# Patient Record
Sex: FEMALE | Race: BLACK OR AFRICAN AMERICAN | NOT HISPANIC OR LATINO | Employment: FULL TIME | URBAN - METROPOLITAN AREA
[De-identification: names, ages, dates, MRNs, and addresses within clinical notes are randomized per-mention and may not be internally consistent; named-entity substitution may affect disease eponyms.]

---
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
## 2019-03-08 ENCOUNTER — TELEPHONE (OUTPATIENT)
Dept: OBGYN CLINIC | Age: 19
End: 2019-03-08

## 2019-03-08 NOTE — TELEPHONE ENCOUNTER
Pt called said during intercourse she experienced a cramp in the middle of her abdomen  She would like to discuss this w someone

## 2019-03-08 NOTE — TELEPHONE ENCOUNTER
Returned pts' p c - pt states " the cramp she experienced lasted about 20 min after IC, as well  Pt is due to have her period any day now " advised pt the cramp could be connected to her menstrual cycle & to monitor for now  If she continues to experience the cramping with or after IC,  to call for an office appt  Pt verbalized understanding

## 2019-08-06 ENCOUNTER — OFFICE VISIT (OUTPATIENT)
Dept: OBGYN CLINIC | Age: 19
End: 2019-08-06
Payer: COMMERCIAL

## 2019-08-06 VITALS
DIASTOLIC BLOOD PRESSURE: 68 MMHG | HEIGHT: 65 IN | WEIGHT: 143 LBS | BODY MASS INDEX: 23.82 KG/M2 | SYSTOLIC BLOOD PRESSURE: 102 MMHG

## 2019-08-06 DIAGNOSIS — N89.8 VAGINAL DISCHARGE: ICD-10-CM

## 2019-08-06 DIAGNOSIS — K59.00 CONSTIPATION, UNSPECIFIED CONSTIPATION TYPE: ICD-10-CM

## 2019-08-06 DIAGNOSIS — Z11.3 SCREENING FOR STDS (SEXUALLY TRANSMITTED DISEASES): Primary | ICD-10-CM

## 2019-08-06 DIAGNOSIS — R10.2 PELVIC PAIN: ICD-10-CM

## 2019-08-06 PROCEDURE — 87491 CHLMYD TRACH DNA AMP PROBE: CPT | Performed by: NURSE PRACTITIONER

## 2019-08-06 PROCEDURE — 87210 SMEAR WET MOUNT SALINE/INK: CPT | Performed by: NURSE PRACTITIONER

## 2019-08-06 PROCEDURE — 99213 OFFICE O/P EST LOW 20 MIN: CPT | Performed by: NURSE PRACTITIONER

## 2019-08-06 PROCEDURE — 87591 N.GONORRHOEAE DNA AMP PROB: CPT | Performed by: NURSE PRACTITIONER

## 2019-08-06 NOTE — PROGRESS NOTES
Assessment/Plan:    No problem-specific Assessment & Plan notes found for this encounter  Diagnoses and all orders for this visit:    Screening for STDs (sexually transmitted diseases)  -     Chlamydia/GC amplified DNA by PCR  -     Trichomonas Vaginalis, FANI    Constipation, unspecified constipation type  -     Ambulatory referral to Gastroenterology; Future    Pelvic pain  -     US pelvis complete w transvaginal; Future    Vaginal discharge  -     POCT wet mount        Given handout to read about pain with sex, will see GI  Subjective:      Patient ID: Trinh Rosales is a 22 y o  female  Pleasant 22 y o  here for pelvic pain complaints x 5 months  Seems the pain is worse after sex- feels like a marly cramp that won't go away  She denies any treatments tried  Denies recent antibiotic use  Denies douching  Denies fever  +new sexual partner since last check but states pain was there even before them  Having vaginal discharge but no odor or itching  Declines a BCM, didn't like ocp or depo  Has a chronic constipation history and would like to see GI to see if it helps with her pelvic pain  The following portions of the patient's history were reviewed and updated as appropriate:   She  has a past medical history of No known health problems  She There are no active problems to display for this patient  She  has a past surgical history that includes No past surgeries  Her family history includes Breast cancer in her cousin  She  reports that she has quit smoking  She has never used smokeless tobacco  She reports that she drinks alcohol  She reports that she does not use drugs  No current outpatient medications on file  No current facility-administered medications for this visit  She has No Known Allergies  OB History    Para Term  AB Living   0 0 0 0 0 0   SAB TAB Ectopic Multiple Live Births   0 0 0 0 0           Review of Systems   Constitutional: Negative for chills, fatigue, fever and unexpected weight change  HENT: Negative for mouth sores and trouble swallowing  Gastrointestinal: Negative for abdominal distention, blood in stool, constipation and diarrhea  Genitourinary: Positive for vaginal discharge  Negative for difficulty urinating, dyspareunia, dysuria, frequency, genital sores, hematuria, menstrual problem, pelvic pain and vaginal pain  Musculoskeletal: Negative for neck stiffness  Psychiatric/Behavioral: Negative for agitation, confusion, self-injury and suicidal ideas  The patient is not nervous/anxious  Objective:      /68 (BP Location: Right arm, Patient Position: Sitting)   Ht 5' 5" (1 651 m)   Wt 64 9 kg (143 lb)   LMP 07/30/2019   Breastfeeding? No   BMI 23 80 kg/m²          Physical Exam   Constitutional: She appears well-developed and well-nourished  She is cooperative  No distress  Abdominal: Soft  Hernia confirmed negative in the right inguinal area and confirmed negative in the left inguinal area  Genitourinary: No labial fusion  There is no rash, tenderness, lesion or injury on the right labia  There is no rash, tenderness, lesion or injury on the left labia  Uterus is not deviated, not enlarged, not fixed and not tender  Cervix exhibits discharge  Cervix exhibits no motion tenderness and no friability  Right adnexum displays no mass, no tenderness and no fullness  Left adnexum displays no mass, no tenderness and no fullness  No erythema, tenderness or bleeding in the vagina  No foreign body in the vagina  No signs of injury around the vagina  Vaginal discharge found  Genitourinary Comments: Wet mount showed + occas hyphae (denies itching),   ph 4 5, +SHEETS of wbcs but no trich seen, whiff neg, clue cells neg   Lymphadenopathy:        Right: No inguinal adenopathy present  Left: No inguinal adenopathy present  Skin: She is not diaphoretic

## 2019-08-08 LAB
C TRACH DNA SPEC QL NAA+PROBE: POSITIVE
N GONORRHOEA DNA SPEC QL NAA+PROBE: POSITIVE

## 2019-08-12 DIAGNOSIS — A74.9 CHLAMYDIA: Primary | ICD-10-CM

## 2019-08-12 RX ORDER — AZITHROMYCIN 500 MG/1
1000 TABLET, FILM COATED ORAL DAILY
Qty: 2 TABLET | Refills: 0 | Status: SHIPPED | OUTPATIENT
Start: 2019-08-12 | End: 2019-08-13

## 2019-08-13 ENCOUNTER — CLINICAL SUPPORT (OUTPATIENT)
Dept: OBGYN CLINIC | Age: 19
End: 2019-08-13
Payer: COMMERCIAL

## 2019-08-13 VITALS — DIASTOLIC BLOOD PRESSURE: 64 MMHG | WEIGHT: 142.2 LBS | SYSTOLIC BLOOD PRESSURE: 100 MMHG | BODY MASS INDEX: 23.66 KG/M2

## 2019-08-13 DIAGNOSIS — A74.9 CHLAMYDIA: ICD-10-CM

## 2019-08-13 DIAGNOSIS — A54.9 GONORRHEA: Primary | ICD-10-CM

## 2019-08-13 PROCEDURE — 99213 OFFICE O/P EST LOW 20 MIN: CPT | Performed by: NURSE PRACTITIONER

## 2019-08-13 RX ORDER — CEFTRIAXONE SODIUM 250 MG/1
250 INJECTION, POWDER, FOR SOLUTION INTRAMUSCULAR; INTRAVENOUS ONCE
Status: COMPLETED | OUTPATIENT
Start: 2019-08-13 | End: 2019-08-13

## 2019-08-13 RX ADMIN — CEFTRIAXONE SODIUM 250 MG: 250 INJECTION, POWDER, FOR SOLUTION INTRAMUSCULAR; INTRAVENOUS at 16:40

## 2019-08-13 NOTE — PATIENT INSTRUCTIONS
Safe Sex   AMBULATORY CARE:   Safe sex  is a combination of practices taken to prevent pregnancy and the spread of sexually transmitted infections (STIs)  These practices help to decrease or prevent the exchange of body fluids during sexual contact  Body fluids include saliva, urine, blood, vaginal fluids, and semen  All types of sex can cause STIs  This includes oral, vaginal, and anal sex  Seek care immediately if:   · A condom breaks, leaks, or slips off while you are having sex  · You notice sores on your penis, vagina, anal area, or skin around them  · You have had unsafe sex and want to discuss emergency contraception or treatment for STI exposure  Contact your healthcare provider if:   · You think you might be pregnant  · You have questions or concerns about your condition or care  How to practice safe sex:  Talk to your partner before you have sex  Ask about his or her sexual history and any current or past STI  · Use condoms and barrier methods for all types of sexual contact  Use a new condom or latex barrier each time you have sex  This includes oral, vaginal, and anal sex  Make sure that the condom fits and is put on correctly  Rubber latex sheets or dental dams can be used for oral sex  Ask your healthcare provider how to use these items and where to purchase them  If you are allergic to latex, use a nonlatex product such as polyurethane  · Limit your number of sexual partners  More than one sex partner can increase your risk for an STI  Do not have sex with anyone whose sexual history you do not know  · Do not do activities that can pass germs  Do not use saliva as a lubricant or share sex toys  · Tell your sex partner if you have an STI  Your partner may need to be tested and treated  Do not have sex while you are being treated for an STI, or with a partner who is being treated  · Get tested regularly for STIs    Get tested if you have had sexual contact with someone who has an STI  Get tested if you have unprotected sex with any new partner  · Get vaccinated  Vaccines may help to lower your risk for an STI such as HPV, hepatitis A, or hepatitis B  Ask your healthcare provider for more information on vaccines  Other ways to practice safe sex:   · Only use water-based lubricants during sex  Water-based lubricants may prevent sores or cuts in the vagina or penis  Prevent sores or cuts to decrease your risk for an STI  Do not use oil-based lubricants, such as baby oil or hand lotion, with latex condoms or barriers  These will weaken the latex and may cause it to break  · Do not use chemical irritants on condoms or genitals  Products that contain chemical irritants, such as spermicides, can irritate the lining of your vagina or rectum  Irritation may cause sores that may increase your risk for an STI  · Be careful when you have sex if you have open sores or cuts  Open sores or cuts may increase your risk for an STI  This includes new piercings and tattoos  Keep all open sores or cuts covered during sex  Do not have oral sex if you have cuts or sores in your mouth  Ask your healthcare provider when it is safe to have sex after you get a tattoo or piercing  · Do not use alcohol or drugs before sex  These substances can prevent you from thinking clearly and increase your risk for unsafe sex  Follow up with your healthcare provider as directed:  Write down your questions so you remember to ask them during your visits  © 2017 [employer] Information is for End User's use only and may not be sold, redistributed or otherwise used for commercial purposes  All illustrations and images included in CareNotes® are the copyrighted property of [employer]  or [employer]  The above information is an  only  It is not intended as medical advice for individual conditions or treatments   Talk to your doctor, nurse or pharmacist before following any medical regimen to see if it is safe and effective for you

## 2019-10-28 NOTE — PROGRESS NOTES
Assessment  1  Acute vaginitis (616 10) (N76 0)   2  History of Screening for STD (sexually transmitted disease) (V74 5) (Z11 3)    Plan  Acute vaginitis    · Fluconazole 150 MG Oral Tablet (Diflucan); TAKE 1 TABLET NOW, AND REPEAT IN4 DAYS   Rx By: Natalya Kelly; Dispense: 2 Days ; #:2 Tablet; Refill: 1;For: Acute vaginitis; SAMIA = N; Sent To: [id number]   · MetroNIDAZOLE 0 75 % Vaginal Gel; INSERT 1 APPLICATORFUL INTRAVAGINALLYAT BEDTIME NIGHTLY   Rx By: Natalya Kelly; Dispense: 5 Days ; #:42 GM; Refill: 0;For: Acute vaginitis; SAMIA = N; Sent To: [facility] #[id number]    Discussion/Summary  Goals and Barriers: The patient has the current Goals: Prevent pregnancy  The patent has the current Barriers: Due for dmpa, no documentation of this  Patient's Capacity to Self-Care: Patient is able to Self-Care  Medication SE Review and Pt Understands Tx: Possible side effects of new medications were reviewed with the patient/guardian today  The treatment plan was reviewed with the patient/guardian  The patient/guardian understands and agrees with the treatment plan   Self Referrals:   Self Referrals: Yes      Chief Complaint  Chief Complaint Free Text Note Form: Patient here for a possible infection  Patient states discomfort since Tuesday  States has d/c and odor since Monday  Patient would like STD testing  No treatments tried  History of Present Illness  HPI: 25 yo pleasant female here for vaginal complaints  States has yellow d/c and odor for past week  Denies recent abx use but has had new sexual contacts since last checked in March  She denies pelvic pain and fever  Denies GI or  issues  Will bring documentation of her last DMPA shot so we can give it to her  Review of Systems  Focused-Female:  Constitutional: No fever, no chills, feels well, no tiredness, no recent weight gain or loss  Breasts: no complaints of breast pain, breast lump or nipple discharge    Gastrointestinal: no complaints [FreeTextEntry1] : 75 y/o male presents today for wound f/u s/p Watchman. \par Pt reports that the bruising on the R leg is extending to the knee. Denies any fever, tenderness, pain, chest pain, SOB, palpitations and back pain. \par \par Pt had a Watchman on 10/23/19 of abdominal pain, no constipation, no nausea or diarrhea, no vomiting, no bloody stools  Genitourinary: vaginal discharge, but-- no dysuria,-- no pelvic pain,-- no incontinence,-- no dysmenorrhea-- and-- no unexplained vaginal bleeding  ROS Reviewed:   ROS reviewed  Active Problems  1  Irregular menses (626 4) (N92 6)    Past Medical History  1  History of Counseling for initiation of birth control method (V25 02) (Z30 09)   2  History of Encounter for gynecological examination without abnormal finding (V72 31) (Z01 419)   3  History of General counseling for initiation of other contraceptive measures (V25 02) (Z30 09)   4  History of laryngitis (V12 69) (Z87 09)  Active Problems And Past Medical History Reviewed: The active problems and past medical history were reviewed and updated today  Surgical History  Surgical History Reviewed: The surgical history was reviewed and updated today  Family History  Father    1  Family history of varicose veins (V17 49) (Z82 49)  Paternal Grandmother    2  Family history of malignant neoplasm (V16 9) (Z80 9)   3  Family history of Headache  Maternal Cousin    4  Family history of lymphoma (V16 7) (Z80 2)   5  Family history of malignant neoplasm (V16 9) (Z80 9)   6  Family history of thyroid disease (V18 19) (Z83 49)  Multiple Family Members    7  Family history of diabetes mellitus (V18 0) (Z83 3)   8  Family history of hypertension (V17 49) (Z82 49)  Maternal Relatives    9  Family history of diabetes mellitus (V18 0) (Z83 3)   10  Family history of hypertension (V17 49) (Z82 49)  Paternal Relatives    6  Family history of diabetes mellitus (V18 0) (Z83 3)   12  Family history of hypertension (V17 49) (Z82 49)  Family History Reviewed: The family history was reviewed and updated today  Social History   · Currently sexually active   · Never a smoker   · Social alcohol use (Z78 9)   · Student  Social History Reviewed:  The social history was reviewed and is unchanged  At [facility]      Current Meds   1  Depo-Provera 150 MG/ML Intramuscular Suspension; Therapy: (Recorded:[id number]) to Recorded  Medication List Reviewed: The medication list was reviewed and updated today  Allergies  1  No Known Drug Allergies    Vitals  Vital Signs    Recorded: [id number] 37:66AZ   Systolic 180, RUE, Sitting   Diastolic 80, RUE, Sitting   Height 5 ft 6 in   Weight 139 lb    BMI Calculated 22 44   BSA Calculated 1 71   BMI Percentile 63 %   2-20 Stature Percentile 76 %   2-20 Weight Percentile 74 %   LMP depo     Physical Exam   Constitutional  General appearance: No acute distress, well appearing and well nourished  Pulmonary  Respiratory effort: No increased work of breathing or signs of respiratory distress  Genitourinary  External genitalia: Normal and no lesions appreciated  Vagina: Normal, no lesions or dryness appreciated  Urethral meatus: Normal    Bladder: Normal, soft, non-tender and no prolapse or masses appreciated  Cervix: Normal, no palpable masses  -- +ectropion  Uterus: Normal, non-tender, not enlarged, and no palpable masses  Adnexa/parametria: Normal, non-tender and no fullness or masses appreciated     Psychiatric  Orientation to person, place, and time: Normal    Mood and affect: Normal        Procedure  wet mount showed hyphae and clue cells, whiff negative      Signatures   Electronically signed by : TREY Chau; Dec 18 2017  8:25AM EST                       (Author)    Electronically signed by : Marlyn Mayen MD; Dec 18 2017 12:59PM EST

## 2020-03-10 ENCOUNTER — OFFICE VISIT (OUTPATIENT)
Dept: OBGYN CLINIC | Facility: CLINIC | Age: 20
End: 2020-03-10
Payer: COMMERCIAL

## 2020-03-10 VITALS
DIASTOLIC BLOOD PRESSURE: 62 MMHG | WEIGHT: 143.6 LBS | SYSTOLIC BLOOD PRESSURE: 100 MMHG | HEIGHT: 65 IN | BODY MASS INDEX: 23.93 KG/M2

## 2020-03-10 DIAGNOSIS — N92.6 IRREGULAR MENSES: Primary | ICD-10-CM

## 2020-03-10 DIAGNOSIS — Z11.3 SCREENING FOR STDS (SEXUALLY TRANSMITTED DISEASES): ICD-10-CM

## 2020-03-10 DIAGNOSIS — B37.49 CANDIDA INFECTION OF GENITAL REGION: ICD-10-CM

## 2020-03-10 DIAGNOSIS — Z30.011 OCP (ORAL CONTRACEPTIVE PILLS) INITIATION: ICD-10-CM

## 2020-03-10 PROCEDURE — 87661 TRICHOMONAS VAGINALIS AMPLIF: CPT | Performed by: NURSE PRACTITIONER

## 2020-03-10 PROCEDURE — 99214 OFFICE O/P EST MOD 30 MIN: CPT | Performed by: NURSE PRACTITIONER

## 2020-03-10 PROCEDURE — 87491 CHLMYD TRACH DNA AMP PROBE: CPT | Performed by: NURSE PRACTITIONER

## 2020-03-10 PROCEDURE — 87591 N.GONORRHOEAE DNA AMP PROB: CPT | Performed by: NURSE PRACTITIONER

## 2020-03-10 RX ORDER — LEVONORGESTREL AND ETHINYL ESTRADIOL 0.1-0.02MG
1 KIT ORAL DAILY
Qty: 28 TABLET | Refills: 2 | Status: SHIPPED | OUTPATIENT
Start: 2020-03-10 | End: 2020-05-29

## 2020-03-10 RX ORDER — FLUCONAZOLE 150 MG/1
150 TABLET ORAL ONCE
Qty: 1 TABLET | Refills: 6 | Status: SHIPPED | OUTPATIENT
Start: 2020-03-10 | End: 2020-03-10

## 2020-03-10 RX ORDER — DIPHENOXYLATE HYDROCHLORIDE AND ATROPINE SULFATE 2.5; .025 MG/1; MG/1
TABLET ORAL
COMMUNITY
End: 2022-05-13

## 2020-03-10 RX ORDER — RIBOFLAVIN (VITAMIN B2) 100 MG
100 TABLET ORAL DAILY
COMMUNITY
End: 2022-05-13

## 2020-03-10 NOTE — PATIENT INSTRUCTIONS
Vulvovaginal Candidiasis   WHAT YOU NEED TO KNOW:   What is vulvovaginal candidiasis? Vulvovaginal candidiasis, or yeast infection, is a common vaginal infection  Vulvovaginal candidiasis is caused by a fungus, or yeast-like germ  Fungi are normally found in your vagina  When there are too many fungi, it can cause an infection  What increases my risk for vulvovaginal candidiasis? · Pregnancy    · Medical conditions that suppress your immune system, such as diabetes or HIV and AIDS    · Medicines, such as antibiotics, birth control pills, or steroid medicine    · Contraceptive devices, such as diaphragms, sponges, and intrauterine devices  What are the signs and symptoms of vulvovaginal candidiasis? · Thick, white, cheese-like discharge from your vagina    · Itching, swelling, or redness in your vagina    · Burning when you urinate  How is vulvovaginal candidiasis diagnosed? Your healthcare provider will ask about your medical history and examine you  A sample of your vaginal discharge may show what germ is causing your infection  How is vulvovaginal candidiasis treated? Medicines help treat the fungal infection and decrease inflammation  The medicine may be a pill, topical cream, or vaginal suppository  With treatment, the infection is usually gone within a week: How can I manage my symptoms? · Wear cotton underwear  · Keep the vaginal area clean and dry  · Wipe from front to back after you urinate or have a bowel movement  · Do not have sex until your symptoms are gone  · Do not douche  · Do not use strong perfumes or soaps  · Do not use feminine hygiene sprays, powders, or bubble bath  How can I prevent another infection? · Take showers instead of baths  · Eat yogurt  · Limit the amount of alcohol you drink  · Limit your time in hot tubs  · Control your blood sugar if you are diabetic  When should I seek immediate care? · You have fever and chills      · You are bleeding from your vagina and it is not your monthly period  · You develop abdominal or pelvic pain  When should I contact my healthcare provider? · Your signs and symptoms get worse, even after treatment  · You have questions or concerns about your condition or care  CARE AGREEMENT:   You have the right to help plan your care  Learn about your health condition and how it may be treated  Discuss treatment options with your caregivers to decide what care you want to receive  You always have the right to refuse treatment  The above information is an  only  It is not intended as medical advice for individual conditions or treatments  Talk to your doctor, nurse or pharmacist before following any medical regimen to see if it is safe and effective for you  © 2017 [address] [employer] Information is for End User's use only and may not be sold, redistributed or otherwise used for commercial purposes  All illustrations and images included in CareNotes® are the copyrighted property of A D A M , Inc  or [employer]

## 2020-03-10 NOTE — PROGRESS NOTES
Diagnoses and all orders for this visit:    Irregular menses  -     levonorgestrel-ethinyl estradiol (AVIANE,ALESSE,LESSINA) 0 1-20 MG-MCG per tablet; Take 1 tablet by mouth daily for 28 days    Candida infection of genital region  -     fluconazole (DIFLUCAN) 150 mg tablet; Take 1 tablet (150 mg total) by mouth once for 1 dose    OCP (oral contraceptive pills) initiation  -     levonorgestrel-ethinyl estradiol (AVIANE,ALESSE,LESSINA) 0 1-20 MG-MCG per tablet; Take 1 tablet by mouth daily for 28 days    Screening for STDs (sexually transmitted diseases)  -     Trichomonas Vaginalis, FANI  -     Chlamydia/GC amplified DNA by PCR    Other orders  -     multivitamin (THERAGRAN) TABS  -     Ascorbic Acid (VITAMIN C) 100 MG tablet; Take 100 mg by mouth daily        Call if no symptom improvement, all questions answered, return for annual  Given info for Gardasil shots- will think about them  States only received 1  Pleasant 20 y o  here for vaginal complaints of itching a few days ago for which she used Monistat 1 with relief  She requests STD testing bc she has had a new partner  She tested + for HOSP [facility] BAKARI and CT at last visit 8/2019 but was treated and states she tested neg multiple times at her PCP (no results in chart)  Denies recent antibiotic use  Denies douching  Denies fever, pelvic pain or dyspareunia  Pelvic pain resolved after STD treatments so she did not do the pelvic u/s ordered at last visit  She would like to try ocp again for menses regulation since she cycles q 31-60 days  Last time she used ocp she would get frequent Mongolian so agrees to monthly prophylaxis  Past Medical History:   Diagnosis Date    Gonorrhea     No known health problems      Past Surgical History:   Procedure Laterality Date    NO PAST SURGERIES       Social History     Tobacco Use    Smoking status: Former Smoker    Smokeless tobacco: Never Used   Substance Use Topics    Alcohol use:  Yes    Drug use: No     Family History Problem Relation Age of Onset    Breast cancer Cousin     Colon cancer Neg Hx     Ovarian cancer Neg Hx     Uterine cancer Neg Hx     Cervical cancer Neg Hx        Current Outpatient Medications:     Ascorbic Acid (VITAMIN C) 100 MG tablet, Take 100 mg by mouth daily, Disp: , Rfl:     multivitamin (THERAGRAN) TABS, , Disp: , Rfl:     fluconazole (DIFLUCAN) 150 mg tablet, Take 1 tablet (150 mg total) by mouth once for 1 dose, Disp: 1 tablet, Rfl: 6    levonorgestrel-ethinyl estradiol (AVIANE,ALESSE,LESSINA) 0 1-20 MG-MCG per tablet, Take 1 tablet by mouth daily for 28 days, Disp: 28 tablet, Rfl: 2    No Known Allergies  OB History    Para Term  AB Living   0 0 0 0 0 0   SAB TAB Ectopic Multiple Live Births   0 0 0 0 0       Vitals:    03/10/20 0940   BP: 100/62   BP Location: Left arm   Patient Position: Sitting   Cuff Size: Standard   Weight: 65 1 kg (143 lb 9 6 oz)   Height: 5' 5" (1 651 m)     Body mass index is 23 9 kg/m²  Review of Systems   Constitutional: Negative for chills, fatigue, fever and unexpected weight change  Respiratory: Negative for shortness of breath  Gastrointestinal: Negative for anal bleeding, blood in stool, constipation and diarrhea  Genitourinary: Negative for difficulty urinating, dysuria and hematuria  Physical Exam   Constitutional: She appears well-developed and well-nourished  No distress  Alert and oriented  HENT: atraumatic  Head: Normocephalic  Neck: Normal range of motion  Neck supple  Pulmonary: Effort normal   Abdominal: Soft  Pelvic exam was performed with patient supine  No labial fusion  There is no rash, tenderness, lesion or injury on the right labia  There is no rash, tenderness, lesion or injury on the left labia  Urethral meatus does not show any tenderness, inflammation or discharge  Palpation of midline bladder without pain or discomfort  Uterus is not deviated, not enlarged, not fixed and not tender   Cervix exhibits no motion tenderness, no discharge and no friability  Right adnexum displays no mass, no tenderness and no fullness  Left adnexum displays no mass, no tenderness and no fullness  No erythema or tenderness in the vagina  No foreign body in the vagina  No signs of injury around the vagina  Vaginal discharge found  Perineum and anus without areas of injury  No lesions noted or swelling  Lymphadenopathy:        Right: No inguinal adenopathy present  Left: No inguinal adenopathy present       Wet mount showed +hyphae, ph 4 5, no wbcs or trich, whiff neg

## 2020-03-12 LAB
C TRACH DNA SPEC QL NAA+PROBE: NEGATIVE
N GONORRHOEA DNA SPEC QL NAA+PROBE: NEGATIVE

## 2020-03-15 LAB — T VAGINALIS RRNA SPEC QL NAA+PROBE: NEGATIVE

## 2020-05-28 DIAGNOSIS — Z30.011 OCP (ORAL CONTRACEPTIVE PILLS) INITIATION: ICD-10-CM

## 2020-05-28 DIAGNOSIS — N92.6 IRREGULAR MENSES: ICD-10-CM

## 2020-05-29 RX ORDER — LEVONORGESTREL AND ETHINYL ESTRADIOL 0.1-0.02MG
KIT ORAL
Qty: 28 TABLET | Refills: 9 | Status: SHIPPED | OUTPATIENT
Start: 2020-05-29 | End: 2022-05-13

## 2020-07-31 ENCOUNTER — TELEPHONE (OUTPATIENT)
Dept: OBGYN CLINIC | Facility: CLINIC | Age: 20
End: 2020-07-31

## 2020-07-31 NOTE — TELEPHONE ENCOUNTER
Pt messed up taking her pills took a plan b, then forgot to take her pills again had unprotected intercourse and took plan b again was worried this was take again so close together   Advised she takes a hpt and she said she already took one its negative, made sure she has refills and she will start her new pack after her period

## 2021-01-11 ENCOUNTER — TELEPHONE (OUTPATIENT)
Dept: OTHER | Facility: OTHER | Age: 21
End: 2021-01-11

## 2021-01-11 NOTE — TELEPHONE ENCOUNTER
C [x] 1/11/21 10:20 AM 20 TREY Hopkins [[id number]] CHASE ALVARADO [[id number]] YEARLY      Please call back to reschedule

## 2022-01-29 ENCOUNTER — APPOINTMENT (EMERGENCY)
Dept: RADIOLOGY | Facility: HOSPITAL | Age: 22
End: 2022-01-29
Payer: COMMERCIAL

## 2022-01-29 ENCOUNTER — HOSPITAL ENCOUNTER (EMERGENCY)
Facility: HOSPITAL | Age: 22
Discharge: HOME | End: 2022-01-29
Attending: EMERGENCY MEDICINE
Payer: COMMERCIAL

## 2022-01-29 VITALS
TEMPERATURE: 97.6 F | DIASTOLIC BLOOD PRESSURE: 61 MMHG | OXYGEN SATURATION: 100 % | HEART RATE: 78 BPM | RESPIRATION RATE: 18 BRPM | SYSTOLIC BLOOD PRESSURE: 108 MMHG
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
## 2022-01-29 DIAGNOSIS — S20.219A CHEST WALL CONTUSION: ICD-10-CM

## 2022-01-29 LAB
EXT PREG TEST URINE: NORMAL
EXT. CONTROL ED NAV: NORMAL

## 2022-01-29 PROCEDURE — 71046 X-RAY EXAM CHEST 2 VIEWS: CPT

## 2022-01-29 PROCEDURE — 99284 EMERGENCY DEPT VISIT MOD MDM: CPT | Performed by: EMERGENCY MEDICINE

## 2022-01-29 PROCEDURE — 99284 EMERGENCY DEPT VISIT MOD MDM: CPT

## 2022-01-29 PROCEDURE — 81025 URINE PREGNANCY TEST: CPT | Performed by: EMERGENCY MEDICINE

## 2022-01-29 RX ORDER — KETOROLAC TROMETHAMINE 30 MG/ML
15 INJECTION, SOLUTION INTRAMUSCULAR; INTRAVENOUS ONCE
Status: DISCONTINUED | OUTPATIENT
Start: 2022-01-29 | End: 2022-01-29 | Stop reason: HOSPADM
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
## 2022-01-29 NOTE — ED NOTES
Pt reports that she is going to take Ibuprofen when she gets home        Jarrod Landeros, RN  01/29/22 9182

## 2022-05-02 ENCOUNTER — TELEPHONE (OUTPATIENT)
Dept: OBGYN CLINIC | Facility: CLINIC | Age: 22
End: 2022-05-02

## 2022-05-02 NOTE — TELEPHONE ENCOUNTER
Patient recently pregnant last lmp 03/20 and is scheduled for appt for ultrasound but has a few questions

## 2022-05-03 ENCOUNTER — TELEPHONE (OUTPATIENT)
Dept: OBGYN CLINIC | Facility: CLINIC | Age: 22
End: 2022-05-03

## 2022-05-03 NOTE — TELEPHONE ENCOUNTER
Spoke to pt and she is scheduled for early US 6/2  Based on LMP of 3/20 should be seen 5/14-5/23  Also thought she needed HCG  Told her if + UPT not needed  But will ask  if they can find anything sooner for US ( might be difficult)  She is fine with either   Cassandra LEW or XOCHILT Ceja

## 2022-05-13 ENCOUNTER — ULTRASOUND (OUTPATIENT)
Dept: OBGYN CLINIC | Facility: CLINIC | Age: 22
End: 2022-05-13
Payer: COMMERCIAL

## 2022-05-13 VITALS — DIASTOLIC BLOOD PRESSURE: 62 MMHG | WEIGHT: 155.6 LBS | SYSTOLIC BLOOD PRESSURE: 104 MMHG | BODY MASS INDEX: 25.89 KG/M2

## 2022-05-13 DIAGNOSIS — N91.1 SECONDARY AMENORRHEA: Primary | ICD-10-CM

## 2022-05-13 DIAGNOSIS — Z13.79 GENETIC SCREENING: ICD-10-CM

## 2022-05-13 PROCEDURE — 99214 OFFICE O/P EST MOD 30 MIN: CPT | Performed by: OBSTETRICS & GYNECOLOGY

## 2022-05-13 NOTE — PROGRESS NOTES
Assessment/Plan:    Secondary amenorrhea      Genetic screening  -     Ambulatory Referral to Maternal Fetal Medicine; Future    [facility] @ 7w 5d EDC 2022 by LMP       -    Schedule NOB intake and physical          Subjective      Doug Bullock is a 24 y o   LMP 2022 who presents with amenorrhea and + urine hCG  Some blood-tinged discharge which has resolved  Nausea only  Cycle length: regular 28d  Pregnancy testing: at home  Pregnancy imaging: not done  Blood type: unknown  Other lab results: none  The following portions of the patient's history were reviewed and updated as appropriate: allergies, current medications, past family history, past medical history, past social history, past surgical history and problem list     Review of Systems  Pertinent items are noted in HPI       Objective      /62 (BP Location: Right arm, Patient Position: Sitting, Cuff Size: Standard)   Wt 70 6 kg (155 lb 9 6 oz)   BMI 25 89 kg/m²     General: alert and oriented, in no acute distress   Heart: regular rate and rhythm, S1, S2 normal, no murmur, click, rub or gallop   Lungs: clear to auscultation bilaterally   Abdomen: soft, non-tender, without masses or organomegaly   Vulva: normal

## 2022-05-23 ENCOUNTER — TELEPHONE (OUTPATIENT)
Dept: PERINATAL CARE | Facility: OTHER | Age: 22
End: 2022-05-23

## 2022-05-23 NOTE — TELEPHONE ENCOUNTER
Multiple attempts to reach patient to schedule two ultrasound appointments per referral from [facility] office  Unable to reach for scheduling

## 2022-05-27 ENCOUNTER — INITIAL PRENATAL (OUTPATIENT)
Dept: OBGYN CLINIC | Facility: CLINIC | Age: 22
End: 2022-05-27

## 2022-05-27 VITALS — BODY MASS INDEX: 25.89 KG/M2 | HEIGHT: 65 IN

## 2022-05-27 DIAGNOSIS — Z34.01 ENCOUNTER FOR SUPERVISION OF NORMAL FIRST PREGNANCY IN FIRST TRIMESTER: Primary | ICD-10-CM

## 2022-05-27 PROCEDURE — OBC: Performed by: OBSTETRICS & GYNECOLOGY

## 2022-05-27 RX ORDER — CETIRIZINE HYDROCHLORIDE 10 MG/1
10 TABLET ORAL DAILY
COMMUNITY
End: 2022-06-13

## 2022-05-27 NOTE — PROGRESS NOTES
OB INTAKE INTERVIEW  Patient is 21 y o  who presents for OB intake at 9-5 wks  She is accompanied by: phone interview  The father of her baby Camilla Oleary) is involved in the pregnancy and is supportive    Last Menstrual Period: 3/20/22  Ultrasound: Measured 7 weeks 5 days on 2022  Estimated Date of Delivery: 22 via LMP & US     Signs/Symptoms of Pregnancy  Current pregnancy symptoms: constipation  Constipation YES  Headaches YES-occas  Cramping/spotting no  PICA cravings no    Diabetes-    If patient has 1 or more, please order early 1 hour GTT  History of GDM no  BMI >35 no  History of PCOS or current metformin use no  History of LGA/macrosomic infant (4000g/9lbs) no    If patient has 2 or more, please order early 1 hour GTT  BMI>30 no  AMA no  First degree relative with type 2 diabetes no  History of chronic HTN, hyperlipidemia, elevated A1C no  High risk race (, , ,  or ) no    Hypertension- if you answer yes, please order preeclampsia labs (cbc, comprehensive metabolic panel, urine protein creatinine ratio, uric acid)  History of of chronic HTN no  History of gestational HTN no  History of preeclampsia, eclampsia, or HELLP syndrome no  History of diabetes no  History of lupus, autoimmune disease, kidney disease no    Thyroid- if yes order TSH with reflex T4  History of thyroid disease no    Bleeding Disorder or Hx of DVT-patient or first degree relative with history of  Order the following if not done previously  (Factor V, antithrombin III, prothrombin gene mutation, protein C and S Ag, lupus anticoagulant, anticardiolipin, beta-2 glycoprotein)   no    OB/GYN-  History of abnormal pap smear no  History of HPV no  History of Herpes/HSV no  History of other STI (gonorrhea, chlamydia, trich) YES-gonorrhea , was tx'd    History of prior  no  History of prior  no  History of  delivery prior to 36 weeks 6 days no  History of blood transfusion no  Ok for blood transfusion yes    Substance screening- if yes outside of tobacco for her or anyone in her home-order urine drug screen  History of tobacco use no  Currently using tobacco no  Currently using alcohol no  Presently using drugs no  Past drug use  Jaye Madrigal last used 2022  IV drug use-If yes add Hep C antibody to labs no  Partner drug use YES-marijuana  Parent/Family drug use YES-pt's uncle    MRSA Screening-   Does the pt have a hx of MRSA? no  If yes- please follow MRSA protocol and obtain a nasal swab for MRSA culture    Immunizations:  Influenza vaccine given this season no  Discussed Tdap vaccine yes  Discussed COVID Vaccine yes    Genetic/M-  Do you or your partner have a history of any of the following in yourselves or first degree relatives? Cystic fibrosis no  Spinal muscular atrophy no  Hemoglobinopathy/Sickle Cell/Thalassemia  Yes- pt carries sickle cell trait  Fragile X Intellectual Disability no    If yes, discuss carrier screening and recommend consultation with [facility]/genetic counseling  If no, discuss option for carrier screening and/or genetic testing with Nuchal Ultrasound  Patient interested yes  Appointment at [facility] made no- pt to call today for an appt    Interview education  [facility] Pregnancy Essentials Book reviewed and discussed yes    Nurse/Family Partnership- patient may qualify no; referral placed no    Prenatal lab work scripts yes  Extra labs ordered:  no    The patient has a history now or in prior pregnancy notable for:      Pt carries sickle cell trait,    Hx of Depression- no meds  ,  Hx of gonorrhea-tx'd,       Details that I feel the provider should be aware of:  Pt denies having received Flu or Covid vaccines   -discussed importance of receiving both  PN1 visit scheduled  The patient was oriented to our practice, reviewed delivering physicians and [address] [place] [address] for Delivery  All questions were answered    PN phone interview completed  Pt is happy with pregnancy  States FOB supportive  PN bldwk ordered in epic  Encouraged pt to have bldwk drawn prior to PN1 visit, scheduled for 6/13/2022  Referral entered for [facility] pt is interested in seq screen  Pt to call today for an appt  Advised pt to call with any further questions/concerns        Interviewed by: Neftaly Gutierrez RN, [address] [place] [address]

## 2022-06-09 ENCOUNTER — APPOINTMENT (OUTPATIENT)
Dept: LAB | Facility: HOSPITAL | Age: 22
End: 2022-06-09
Payer: COMMERCIAL

## 2022-06-09 DIAGNOSIS — Z34.01 ENCOUNTER FOR SUPERVISION OF NORMAL FIRST PREGNANCY IN FIRST TRIMESTER: Primary | ICD-10-CM

## 2022-06-09 LAB
ABO GROUP BLD: NORMAL
BACTERIA UR QL AUTO: NORMAL /HPF
BASOPHILS # BLD AUTO: 0.03 THOUSANDS/ΜL (ref 0–0.1)
BASOPHILS NFR BLD AUTO: 0 % (ref 0–1)
BILIRUB UR QL STRIP: NEGATIVE
BLD GP AB SCN SERPL QL: NEGATIVE
CLARITY UR: CLEAR
COLOR UR: YELLOW
EOSINOPHIL # BLD AUTO: 0.12 THOUSAND/ΜL (ref 0–0.61)
EOSINOPHIL NFR BLD AUTO: 1 % (ref 0–6)
ERYTHROCYTE [DISTWIDTH] IN BLOOD BY AUTOMATED COUNT: 13.2 % (ref 11.6–15.1)
GLUCOSE UR STRIP-MCNC: NEGATIVE MG/DL
HBV SURFACE AG SER QL: NORMAL
HCT VFR BLD AUTO: 37 % (ref 34.8–46.1)
HCV AB SER QL: NORMAL
HGB BLD-MCNC: 11.8 G/DL (ref 11.5–15.4)
HGB UR QL STRIP.AUTO: NEGATIVE
IMM GRANULOCYTES # BLD AUTO: 0.05 THOUSAND/UL (ref 0–0.2)
IMM GRANULOCYTES NFR BLD AUTO: 1 % (ref 0–2)
KETONES UR STRIP-MCNC: NEGATIVE MG/DL
LEUKOCYTE ESTERASE UR QL STRIP: NEGATIVE
LYMPHOCYTES # BLD AUTO: 1.6 THOUSANDS/ΜL (ref 0.6–4.47)
LYMPHOCYTES NFR BLD AUTO: 17 % (ref 14–44)
MCH RBC QN AUTO: 27.3 PG (ref 26.8–34.3)
MCHC RBC AUTO-ENTMCNC: 31.9 G/DL (ref 31.4–37.4)
MCV RBC AUTO: 86 FL (ref 82–98)
MONOCYTES # BLD AUTO: 0.59 THOUSAND/ΜL (ref 0.17–1.22)
MONOCYTES NFR BLD AUTO: 6 % (ref 4–12)
NEUTROPHILS # BLD AUTO: 6.95 THOUSANDS/ΜL (ref 1.85–7.62)
NEUTS SEG NFR BLD AUTO: 75 % (ref 43–75)
NITRITE UR QL STRIP: NEGATIVE
NON-SQ EPI CELLS URNS QL MICRO: NORMAL /HPF
NRBC BLD AUTO-RTO: 0 /100 WBCS
PH UR STRIP.AUTO: 7 [PH]
PLATELET # BLD AUTO: 289 THOUSANDS/UL (ref 149–390)
PMV BLD AUTO: 10 FL (ref 8.9–12.7)
PROT UR STRIP-MCNC: NEGATIVE MG/DL
RBC # BLD AUTO: 4.33 MILLION/UL (ref 3.81–5.12)
RBC #/AREA URNS AUTO: NORMAL /HPF
RH BLD: POSITIVE
SP GR UR STRIP.AUTO: <=1.005 (ref 1–1.03)
SPECIMEN EXPIRATION DATE: NORMAL
UROBILINOGEN UR QL STRIP.AUTO: 0.2 E.U./DL
WBC # BLD AUTO: 9.34 THOUSAND/UL (ref 4.31–10.16)
WBC #/AREA URNS AUTO: NORMAL /HPF

## 2022-06-09 PROCEDURE — 36415 COLL VENOUS BLD VENIPUNCTURE: CPT

## 2022-06-09 PROCEDURE — 86803 HEPATITIS C AB TEST: CPT

## 2022-06-09 PROCEDURE — 81001 URINALYSIS AUTO W/SCOPE: CPT

## 2022-06-09 PROCEDURE — 87086 URINE CULTURE/COLONY COUNT: CPT

## 2022-06-09 PROCEDURE — 80081 OBSTETRIC PANEL INC HIV TSTG: CPT

## 2022-06-10 LAB
BACTERIA UR CULT: NORMAL
HIV 1+2 AB+HIV1 P24 AG SERPL QL IA: NORMAL
RPR SER QL: NORMAL
RUBV IGG SERPL IA-ACNC: 82 IU/ML

## 2022-06-13 ENCOUNTER — INITIAL PRENATAL (OUTPATIENT)
Dept: OBGYN CLINIC | Facility: CLINIC | Age: 22
End: 2022-06-13

## 2022-06-13 VITALS — SYSTOLIC BLOOD PRESSURE: 106 MMHG | WEIGHT: 153 LBS | BODY MASS INDEX: 25.46 KG/M2 | DIASTOLIC BLOOD PRESSURE: 70 MMHG

## 2022-06-13 DIAGNOSIS — O99.019 SICKLE CELL TRAIT IN MOTHER AFFECTING PREGNANCY (HCC): ICD-10-CM

## 2022-06-13 DIAGNOSIS — Z3A.12 12 WEEKS GESTATION OF PREGNANCY: ICD-10-CM

## 2022-06-13 DIAGNOSIS — D57.3 SICKLE CELL TRAIT IN MOTHER AFFECTING PREGNANCY (HCC): ICD-10-CM

## 2022-06-13 DIAGNOSIS — F32.A DEPRESSION DURING PREGNANCY IN FIRST TRIMESTER: ICD-10-CM

## 2022-06-13 DIAGNOSIS — Z11.3 SCREENING FOR STDS (SEXUALLY TRANSMITTED DISEASES): ICD-10-CM

## 2022-06-13 DIAGNOSIS — O99.341 DEPRESSION DURING PREGNANCY IN FIRST TRIMESTER: ICD-10-CM

## 2022-06-13 DIAGNOSIS — Z01.419 ENCOUNTER FOR GYNECOLOGICAL EXAMINATION WITHOUT ABNORMAL FINDING: Primary | ICD-10-CM

## 2022-06-13 DIAGNOSIS — Z34.01 PRENATAL CARE, FIRST PREGNANCY, FIRST TRIMESTER: ICD-10-CM

## 2022-06-13 LAB
SL AMB  POCT GLUCOSE, UA: NORMAL
SL AMB POCT URINE PROTEIN: NORMAL

## 2022-06-13 PROCEDURE — 87491 CHLMYD TRACH DNA AMP PROBE: CPT | Performed by: PHYSICIAN ASSISTANT

## 2022-06-13 PROCEDURE — PNV: Performed by: PHYSICIAN ASSISTANT

## 2022-06-13 PROCEDURE — G0145 SCR C/V CYTO,THINLAYER,RESCR: HCPCS | Performed by: PHYSICIAN ASSISTANT

## 2022-06-13 PROCEDURE — 87591 N.GONORRHOEAE DNA AMP PROB: CPT | Performed by: PHYSICIAN ASSISTANT

## 2022-06-13 NOTE — PATIENT INSTRUCTIONS
Pregnancy at 11 to 14 Weeks   AMBULATORY CARE:   Changes happening to your body: You are now at the end of your first trimester and entering your second trimester  Morning sickness usually goes away by this time  You may have other symptoms such as fatigue, frequent urination, and headaches  You may have gained 2 to 4 pounds by now  Seek care immediately if:   You have pain or cramping in your abdomen or low back  You have heavy vaginal bleeding or clotting  You pass material that looks like tissue or large clots  Collect the material and bring it with you  Call your doctor or obstetrician if:   You cannot keep food or drinks down, and you are losing weight  You have light vaginal bleeding  You have chills or a fever  You have vaginal itching, burning, or pain  You have yellow, green, white, or foul-smelling vaginal discharge  You have pain or burning when you urinate, less urine than usual, or pink or bloody urine  You have questions or concerns about your condition or care  How to care for yourself at this stage of your pregnancy:       Get plenty of rest   You may feel more tired than normal  You may need to take naps or go to bed earlier  Manage nausea and vomiting  Avoid fatty and spicy foods  Eat small meals throughout the day instead of large meals  Danni may help to decrease nausea  Ask your healthcare provider about other ways of decreasing nausea and vomiting  Eat a variety of healthy foods  Healthy foods include fruits, vegetables, whole-grain breads, low-fat dairy foods, beans, lean meats, and fish  Drink liquids as directed  Ask how much liquid to drink each day and which liquids are best for you  Limit caffeine to less than 200 milligrams each day  Limit your intake of fish to 2 servings each week  Choose fish low in mercury such as canned light tuna, shrimp, salmon, cod, or tilapia   Do not  eat fish high in mercury such as swordfish, tilefish, rell mackerel, and shark  Take prenatal vitamins as directed  Your need for certain vitamins and minerals, such as folic acid, increases during pregnancy  Prenatal vitamins provide some of the extra vitamins and minerals you need  Prenatal vitamins may also help to decrease the risk of certain birth defects  Do not smoke  Smoking increases your risk of a miscarriage and other health problems during your pregnancy  Smoking can cause your baby to be born too early or weigh less at birth  Ask your healthcare provider for information if you need help quitting  Do not drink alcohol  Alcohol passes from your body to your baby through the placenta  It can affect your baby's brain development and cause fetal alcohol syndrome (FAS)  FAS is a group of conditions that causes mental, behavior, and growth problems  Talk to your healthcare provider before you take any medicines  Many medicines may harm your baby if you take them when you are pregnant  Do not take any medicines, vitamins, herbs, or supplements without first talking to your healthcare provider  Never use illegal or street drugs (such as marijuana or cocaine) while you are pregnant  Safety tips during pregnancy:   Avoid hot tubs and saunas  Do not use a hot tub or sauna while you are pregnant, especially during your first trimester  Hot tubs and saunas may raise your baby's temperature and increase the risk of birth defects  Avoid toxoplasmosis  This is an infection caused by eating raw meat or being around infected cat feces  It can cause birth defects, miscarriages, and other problems  Wash your hands after you touch raw meat  Make sure any meat is well-cooked before you eat it  Avoid raw eggs and unpasteurized milk  Use gloves or ask someone else to clean your cat's litter box while you are pregnant  Changes happening with your baby: Your baby has fully formed fingernails and toenails  Your baby's heartbeat can now be heard   Ask your healthcare provider if you can listen to your baby's heartbeat  By week 14, your baby is over 4 inches long from the top of the head to the rump (baby's bottom)  Your baby weighs over 3 ounces  Prenatal care:  Prenatal care is a series of visits with your healthcare provider throughout your pregnancy  During the first 28 weeks of your pregnancy, you will see your healthcare provider 1 time each month  Prenatal care can help prevent problems during pregnancy and childbirth  Your healthcare provider will check your blood pressure and weight  Your baby's heart rate will also be checked  You may also need the following at some visits:  A pelvic exam  allows your healthcare provider to see your cervix (the bottom part of your uterus)  Your healthcare provider will use a speculum to open your vagina  He or she will check the size and shape of your uterus  Blood tests  may be done to check for any of the following:    Gestational diabetes or anemia (low iron level)    Blood type or Rh factor, or certain birth defects    Immunity to certain diseases, such as chickenpox or rubella    An infection, such as a sexually transmitted infection, HIV, or hepatitis B    Hepatitis B  may need to be prevented or treated  Hepatitis B is inflammation of the liver caused by the hepatitis B virus (HBV)  HBV can spread from a mother to her baby during delivery  You will be checked for HBV as early as possible in the first trimester of each pregnancy  You need the test even if you received the hepatitis B vaccine or were tested before  You may need to have an HBV infection treated before you give birth  Urine tests  may also be done to check for sugar and protein  These can be signs of gestational diabetes or preeclampsia  Urine tests may also be done to check for signs of infection  A fetal ultrasound  shows pictures of your baby inside your uterus  The pictures are used to check your baby's development, movement, and position  Genetic disorder screening tests  may be offered to you  These tests check your baby's risk for genetic disorders such as Down syndrome  A screening test includes a blood test and ultrasound  Follow up with your doctor or obstetrician as directed:  Go to all prenatal visits  Write down your questions so you remember to ask them during your visits  © Copyright [employer] 2022 Information is for End User's use only and may not be sold, redistributed or otherwise used for commercial purposes  All illustrations and images included in CareNotes® are the copyrighted property of A D A M , Inc  or [facility]   The above information is an  only  It is not intended as medical advice for individual conditions or treatments  Talk to your doctor, nurse or pharmacist before following any medical regimen to see if it is safe and effective for you

## 2022-06-13 NOTE — PROGRESS NOTES
Patient is here for Initial PN 1 visit  She states she has occasional cramping; denies spotting or nausea  12wks/1days  RULA: 12/25/22  Last pap: never   GC/CH collected:yes, pap vial  PN 1 labs completed    Flu vaccine: no  Covid vaccine: no  Urine: protein: neg/glucose: neg  Blue information packet given: yes and reviewed  Nuchal: 6/17/22  Level II US scheduled: 8/11/22

## 2022-06-14 PROBLEM — D57.3 SICKLE CELL TRAIT IN MOTHER AFFECTING PREGNANCY (HCC): Status: ACTIVE | Noted: 2022-06-14

## 2022-06-14 PROBLEM — O99.019 SICKLE CELL TRAIT IN MOTHER AFFECTING PREGNANCY (HCC): Status: ACTIVE | Noted: 2022-06-14

## 2022-06-14 PROBLEM — F32.A DEPRESSION: Status: ACTIVE | Noted: 2022-06-14

## 2022-06-14 PROBLEM — Z3A.12 12 WEEKS GESTATION OF PREGNANCY: Status: ACTIVE | Noted: 2022-06-14

## 2022-06-14 NOTE — PROGRESS NOTES
Problem List Items Addressed This Visit        Other    12 weeks gestation of pregnancy     24 y o  Sarthak Covington at [address] with Estimated Date of Delivery: 22 by LMP consistent w/ 1st trimester US  She denies nausea/vomiting and bleeding/cramping  C/o fatigue, slowly improving  Prenatal labs: normal, O+    Genetic screening: Scheduled with [facility] on 2022  OB history:     GYN history: Last pap smear N/A -- first done today  History of gonorrhea, 2019, treated  Denies hx of HSV  Past medical history: depression, migraine, carrier of sickle cell trait without sickle cell anemia  Past surgical history: N/a    Social history: Prior Marijuana last used 2022  D/c when found out pregnant  Denies tobacco, alcohol, or other drug use  Family history: Father with hx of PE  Physical exam: normal    Patient appears well and is not in distress  Neck is supple without masses  Breasts are symmetrical without mass, tenderness, nipple discharge, skin changes or adenopathy  Abdomen is soft and nontender without masses  Gravid  External genitals are normal without lesions or rashes  Vagina is normal without discharge or bleeding  Cervix is normal without discharge or lesion  Closed  Uterus is normal for gestational age  Adnexa are normal, nontender, without palpable mass  Fetal heart tones: 165    Discussed as well during this visit was diet, prenatal vitamins, prenatal visits, lab testing, breast feeding, vaccinations, maternal fetal medicine consultations, and lifestyle      ASSESSMENT/PLAN   Problem List Items Addressed This Visit        Other    12 weeks gestation of pregnancy    Sickle cell trait in mother affecting pregnancy [facility])      Other Visit Diagnoses     Encounter for gynecological examination without abnormal finding    -  Primary    Relevant Orders    Liquid-based pap, screening    Prenatal care, first pregnancy, first trimester        Relevant Orders    Chlamydia/GC amplified DNA by PCR    POCT urine dip (Completed)    Screening for STDs (sexually transmitted diseases)        Relevant Orders    Chlamydia/GC amplified DNA by PCR          1 - Gonorrhea and Chlamydia cultures obtained today from pap  2 - Pap smear with reflex HPV done today  3 - Prenatal labs reviewed -- unremarkable  4 - Genetic screening scheduled for 22  5 - RTO in 4 weeks for routine PN visit    Blue packet given and reviewed     All questions were answered & Ari Rudd expressed understanding  Sickle cell trait in mother affecting pregnancy ([facility] [address] )     Sickle cell trait carrier  FOB unsure if he is a carrier  Reports Mom  from sickle event and unsure if dad is affected  He will obtain personal records and plan to review at [facility] appt  Depression     Hx of depression with good control recently  Previously on medications but states she only took for 2 weeks, the d/c with no improvement  Currently feels sx are well controlled  Reviewed increased risk for PPD and availability for counseling [facility] and [facility] if desired  Declines today                Other Visit Diagnoses     Encounter for gynecological examination without abnormal finding    -  Primary    Relevant Orders    Liquid-based pap, screening    Prenatal care, first pregnancy, first trimester        Relevant Orders    Chlamydia/GC amplified DNA by PCR    POCT urine dip (Completed)    Screening for STDs (sexually transmitted diseases)        Relevant Orders    Chlamydia/GC amplified DNA by PCR

## 2022-06-14 NOTE — ASSESSMENT & PLAN NOTE
24 y o   at [address] with Estimated Date of Delivery: 22 by LMP consistent w/ 1st trimester US  She denies nausea/vomiting and bleeding/cramping  C/o fatigue, slowly improving  Prenatal labs: normal, O+    Genetic screening: Scheduled with [facility] on 2022  OB history:     GYN history: Last pap smear N/A -- first done today  History of gonorrhea, 2019, treated  Denies hx of HSV  Past medical history: depression, migraine, carrier of sickle cell trait without sickle cell anemia  Past surgical history: N/a    Social history: Prior Marijuana last used 2022  D/c when found out pregnant  Denies tobacco, alcohol, or other drug use  Family history: Father with hx of PE  Physical exam: normal    Patient appears well and is not in distress  Neck is supple without masses  Breasts are symmetrical without mass, tenderness, nipple discharge, skin changes or adenopathy  Abdomen is soft and nontender without masses  Gravid  External genitals are normal without lesions or rashes  Vagina is normal without discharge or bleeding  Cervix is normal without discharge or lesion  Closed  Uterus is normal for gestational age  Adnexa are normal, nontender, without palpable mass  Fetal heart tones: 165    Discussed as well during this visit was diet, prenatal vitamins, prenatal visits, lab testing, breast feeding, vaccinations, maternal fetal medicine consultations, and lifestyle      ASSESSMENT/PLAN   Problem List Items Addressed This Visit        Other    12 weeks gestation of pregnancy    Sickle cell trait in mother affecting pregnancy [facility])      Other Visit Diagnoses     Encounter for gynecological examination without abnormal finding    -  Primary    Relevant Orders    Liquid-based pap, screening    Prenatal care, first pregnancy, first trimester        Relevant Orders    Chlamydia/GC amplified DNA by PCR    POCT urine dip (Completed)    Screening for STDs (sexually transmitted diseases)        Relevant Orders    Chlamydia/GC amplified DNA by PCR          1 - Gonorrhea and Chlamydia cultures obtained today from pap  2 - Pap smear with reflex HPV done today  3 - Prenatal labs reviewed -- unremarkable  4 - Genetic screening scheduled for 6/17/22  5 - RTO in 4 weeks for routine PN visit    Blue packet given and reviewed     All questions were answered & Hawa Fong expressed understanding

## 2022-06-14 NOTE — ASSESSMENT & PLAN NOTE
Sickle cell trait carrier  FOB unsure if he is a carrier  Reports Mom  from sickle event and unsure if dad is affected  He will obtain personal records and plan to review at [facility] appt

## 2022-06-14 NOTE — ASSESSMENT & PLAN NOTE
Hx of depression with good control recently  Previously on medications but states she only took for 2 weeks, the d/c with no improvement  Currently feels sx are well controlled  Reviewed increased risk for PPD and availability for counseling at [facility] and [facility] if desired  Declines today

## 2022-06-15 LAB
C TRACH DNA SPEC QL NAA+PROBE: NEGATIVE
N GONORRHOEA DNA SPEC QL NAA+PROBE: NEGATIVE

## 2022-06-16 LAB
LAB AP GYN PRIMARY INTERPRETATION: NORMAL
Lab: NORMAL

## 2022-06-17 ENCOUNTER — APPOINTMENT (OUTPATIENT)
Dept: LAB | Facility: HOSPITAL | Age: 22
End: 2022-06-17
Payer: COMMERCIAL

## 2022-06-17 ENCOUNTER — ROUTINE PRENATAL (OUTPATIENT)
Dept: PERINATAL CARE | Facility: OTHER | Age: 22
End: 2022-06-17
Payer: COMMERCIAL

## 2022-06-17 VITALS
WEIGHT: 157 LBS | DIASTOLIC BLOOD PRESSURE: 72 MMHG | BODY MASS INDEX: 26.16 KG/M2 | SYSTOLIC BLOOD PRESSURE: 109 MMHG | HEART RATE: 88 BPM | HEIGHT: 65 IN

## 2022-06-17 DIAGNOSIS — D57.3 SICKLE CELL TRAIT IN MOTHER AFFECTING PREGNANCY (HCC): ICD-10-CM

## 2022-06-17 DIAGNOSIS — O99.019 SICKLE CELL TRAIT IN MOTHER AFFECTING PREGNANCY (HCC): ICD-10-CM

## 2022-06-17 DIAGNOSIS — O36.80X0 ENCOUNTER TO DETERMINE FETAL VIABILITY OF PREGNANCY, SINGLE OR UNSPECIFIED FETUS: Primary | ICD-10-CM

## 2022-06-17 DIAGNOSIS — Z36.9 UNSPECIFIED ANTENATAL SCREENING: ICD-10-CM

## 2022-06-17 DIAGNOSIS — O34.10 UTERINE FIBROID IN PREGNANCY: ICD-10-CM

## 2022-06-17 DIAGNOSIS — D25.9 UTERINE FIBROID IN PREGNANCY: ICD-10-CM

## 2022-06-17 DIAGNOSIS — Z3A.12 12 WEEKS GESTATION OF PREGNANCY: ICD-10-CM

## 2022-06-17 DIAGNOSIS — Z33.1 PREGNANT STATE, INCIDENTAL: ICD-10-CM

## 2022-06-17 DIAGNOSIS — Z13.79 GENETIC SCREENING: ICD-10-CM

## 2022-06-17 DIAGNOSIS — Z36.82 ENCOUNTER FOR NUCHAL TRANSLUCENCY TESTING: ICD-10-CM

## 2022-06-17 PROCEDURE — 36415 COLL VENOUS BLD VENIPUNCTURE: CPT

## 2022-06-17 PROCEDURE — 76813 OB US NUCHAL MEAS 1 GEST: CPT | Performed by: OBSTETRICS & GYNECOLOGY

## 2022-06-17 PROCEDURE — 76801 OB US < 14 WKS SINGLE FETUS: CPT | Performed by: OBSTETRICS & GYNECOLOGY

## 2022-06-17 PROCEDURE — 99242 OFF/OP CONSLTJ NEW/EST SF 20: CPT | Performed by: OBSTETRICS & GYNECOLOGY

## 2022-06-17 RX ORDER — ASPIRIN 81 MG/1
162 TABLET ORAL DAILY
Qty: 180 TABLET | Refills: 3 | Status: SHIPPED | OUTPATIENT
Start: 2022-06-17

## 2022-06-17 NOTE — PROGRESS NOTES
CONSULT NOTE    James Marshall MD  [facility],  [address]     Thank you for referring your Lora Mcarthur for a Maternal-Fetal Medicine Consultation:  Below is my consultation  Thank you very much for requesting a consultation this very nice patient for the indication of genetic screening  This is the patient's 1st pregnancy  The patient indicates that she has sickle cell trait  I do not see a recent lab order for confirmation  The father of the baby is also a carrier for sickle cell trait given that his mother passed away from sickle cell disease  He is an obligate carrier  The patient's father had a pulmonary embolus  He has had a genetic workup but the patient does not know the results  We discussed the options for genetic screening, including but not limited to first trimester screening, second trimester screening, combined first and second trimester screening, noninvasive prenatal screening (NIPS) for patients at high risk and diagnostic screening through the use of CVS and amniocentesis  We discussed the risks and benefits of each approach including the sensitivities and false positive rates as well as the difference between a screening test and a diagnostic test   At the conclusion of our discussion the patient elected noninvasive prenatal testing utilizing the [employer] Non-invasive prenatal screening (NIPS) test   The patient had this blood work drawn in the office and the results should be available approximately 7-10 days after her blood draw  Her results will be reported from [facility]  Given the patient's history of nulliparity and ethnicity, I recommend initiating low dose aspirin therapy  A recent meta-analysis yielded risk reductions of 24% for preeclampsia, 20% for intrauterine growth restriction, and 14% for  birth, with an absolute risk reduction of 2-5% for preeclampsia, one to 5% for intrauterine growth restriction, and 2-4% for  birth    In this study, there was no identified risk of harm to the mother or fetus but long-term evidence was somewhat limited  Given the overall safety profile and risk-benefit analysis, I recommend 162 mg of aspirin be taken Daily and discontinued at around 36 weeks gestation or 2-3 weeks prior to planned delivery  I reviewed these recommendations with the patient and answered all of her questions to apparent satisfaction  We reviewed today's findings of a solitary uterine fibroid  The patient was unaware of having this previously  We discussed that in general solitary fibroids do not increase the risk for adverse outcomes unless they are very large  They may cause pain and may respond to hormones of pregnancy  We reviewed the availability of treatment should patient developed significant symptoms during pregnancy  We reviewed in detail the autosomal inheritance pattern of sickle cell  We reviewed that given that both the patient and her partner are carriers, there is a 25% chance of having a baby with sickle cell disease  There is a 50% chance of having a baby with sickle cell trait and there is a 25% chance that there child will not be a carrier  We reviewed the availability of diagnostic testing including utilizing a chorionic villus sampling in the 1st trimester or amniocentesis in the 2nd trimester  We reviewed results take 2-3 weeks to return  We reviewed the availability of formal genetic counseling  The patient and her partner declined to undergo or consider diagnostic testing at this time given that they would not consider termination for this condition  They are well aware of the implications given that her partner's mother passed away from complications related to sickle cell disease  I encouraged the patient to obtain more information regarding her father's genetic workup for thrombophilias    Tailored testing of the patient could then be considered or testing for all inherited thrombophilias could also be considered    We discussed follow-up in detail and I recommend an anatomy ultrasound be scheduled for 20 weeks gestation  Thank you very much for allowing us to participate in the care of this very nice patient  Should you have any questions, please do not hesitate to contact our office  Please note, in addition to the time spent discussing the results of the ultrasound, I spent approximately 30 minutes of face-to-face time with the patient, greater than 50% of which was spent in counseling and the coordination of care for this patient  Portions of the record may have been created with voice recognition software  Occasional wrong word or "sound a like" substitutions may have occurred due to the inherent limitations of voice recognition software  Read the chart carefully and recognize, using context, where substitutions have occurred  Bharathi Killian MD  Attending Physician, [place]

## 2022-06-17 NOTE — PROGRESS NOTES
Patient chose to have [facility] Non-invasive Prenatal Screen  Patient given brochure and is aware [facility] will contact patients insurance and coordinate coverage  Patient made aware she will need to respond to text message or e-mail from [employer] within 2 business days or testing will be run through insurance  Patient informed text message will come from area code  "415"  Provided [address] [place] [address] Street # [phone] and web site : [email or web address]  Patient is a hard stick and prefers to go to the lab  Specimens labeled with patient identifiers (name, date of birth, specimen collection date), packed and sent via [employer]  Copy of lab order scanned to Epic media  Maternal Fetal Medicine will have results in approximately 7-10 business days and will call patient or notify via [address]  Patient aware viewing lab result online will reveal fetal sex If ordered  Patient verbalized understanding of all instructions and no questions at this time

## 2022-06-17 NOTE — LETTER
June 17, 2022     Itzel Garcia, [address]    Patient: Will Pro   YOB: 2000   Date of Visit: 6/17/2022       Dear Dr Ronna Roberto: Thank you for referring Will Pro to me for evaluation  Below are my notes for this consultation  If you have questions, please do not hesitate to call me  I look forward to following your patient along with you  Sincerely,        Andry Velarde MD        CC: No Recipients  Andry Velarde MD  6/17/2022 11:57 AM  Sign when Signing Visit  [address], MD  [facility],  [address] [place] [address]     Thank you for referring your Will Pro for a Maternal-Fetal Medicine Consultation:  Below is my consultation  Thank you very much for requesting a consultation this very nice patient for the indication of genetic screening  This is the patient's 1st pregnancy  The patient indicates that she has sickle cell trait  I do not see a recent lab order for confirmation  The father of the baby is also a carrier for sickle cell trait given that his mother passed away from sickle cell disease  He is an obligate carrier  The patient's father had a pulmonary embolus  He has had a genetic workup but the patient does not know the results  We discussed the options for genetic screening, including but not limited to first trimester screening, second trimester screening, combined first and second trimester screening, noninvasive prenatal screening (NIPS) for patients at high risk and diagnostic screening through the use of CVS and amniocentesis    We discussed the risks and benefits of each approach including the sensitivities and false positive rates as well as the difference between a screening test and a diagnostic test   At the conclusion of our discussion the patient elected noninvasive prenatal testing utilizing the [facility] Non-invasive prenatal screening (NIPS) test  The patient had this blood work drawn in the office and the results should be available approximately 7-10 days after her blood draw  Her results will be reported from [facility]  Given the patient's history of nulliparity and ethnicity, I recommend initiating low dose aspirin therapy  A recent meta-analysis yielded risk reductions of 24% for preeclampsia, 20% for intrauterine growth restriction, and 14% for  birth, with an absolute risk reduction of 2-5% for preeclampsia, one to 5% for intrauterine growth restriction, and 2-4% for  birth  In this study, there was no identified risk of harm to the mother or fetus but long-term evidence was somewhat limited  Given the overall safety profile and risk-benefit analysis, I recommend 162 mg of aspirin be taken Daily and discontinued at around 36 weeks gestation or 2-3 weeks prior to planned delivery  I reviewed these recommendations with the patient and answered all of her questions to apparent satisfaction  We reviewed today's findings of a solitary uterine fibroid  The patient was unaware of having this previously  We discussed that in general solitary fibroids do not increase the risk for adverse outcomes unless they are very large  They may cause pain and may respond to hormones of pregnancy  We reviewed the availability of treatment should patient developed significant symptoms during pregnancy  We reviewed in detail the autosomal inheritance pattern of sickle cell  We reviewed that given that both the patient and her partner are carriers, there is a 25% chance of having a baby with sickle cell disease  There is a 50% chance of having a baby with sickle cell trait and there is a 25% chance that there child will not be a carrier  We reviewed the availability of diagnostic testing including utilizing a chorionic villus sampling in the 1st trimester or amniocentesis in the 2nd trimester  We reviewed results take 2-3 weeks to return    We reviewed the availability of formal genetic counseling  The patient and her partner declined to undergo or consider diagnostic testing at this time given that they would not consider termination for this condition  They are well aware of the implications given that her partner's mother passed away from complications related to sickle cell disease  I encouraged the patient to obtain more information regarding her father's genetic workup for thrombophilias  Tailored testing of the patient could then be considered or testing for all inherited thrombophilias could also be considered    We discussed follow-up in detail and I recommend an anatomy ultrasound be scheduled for 20 weeks gestation  Thank you very much for allowing us to participate in the care of this very nice patient  Should you have any questions, please do not hesitate to contact our office  Please note, in addition to the time spent discussing the results of the ultrasound, I spent approximately 30 minutes of face-to-face time with the patient, greater than 50% of which was spent in counseling and the coordination of care for this patient  Portions of the record may have been created with voice recognition software  Occasional wrong word or "sound a like" substitutions may have occurred due to the inherent limitations of voice recognition software  Read the chart carefully and recognize, using context, where substitutions have occurred  Bharathi Castillo MD  Attending Physician, Hasmukh  6/17/2022 11:45 AM  Sign when Signing Visit  Patient chose to have [facility] Non-invasive Prenatal Screen  Patient given brochure and is aware [facility] will contact patients insurance and coordinate coverage  Patient made aware she will need to respond to text message or e-mail from [employer] within 2 business days or testing will be run through insurance    Patient informed text message will come from area code  "415"  Provided [address] [place] [address] # [phone] and web site : [email or web address]  Patient is a hard stick and prefers to go to the lab  Specimens labeled with patient identifiers (name, date of birth, specimen collection date), packed and sent via [employer]  Copy of lab order scanned to Epic media  Maternal Fetal Medicine will have results in approximately 7-10 business days and will call patient or notify via [address]  Patient aware viewing lab result online will reveal fetal sex If ordered  Patient verbalized understanding of all instructions and no questions at this time

## 2022-06-20 LAB — MISCELLANEOUS LAB TEST RESULT: NORMAL

## 2022-06-26 ENCOUNTER — NURSE TRIAGE (OUTPATIENT)
Dept: OTHER | Facility: OTHER | Age: 22
End: 2022-06-26

## 2022-06-26 NOTE — TELEPHONE ENCOUNTER
Reason for Disposition   MODERATE-SEVERE abdominal pain (e g , interferes with normal activities, awakens from sleep)    Answer Assessment - Initial Assessment Questions  1  LOCATION: "Where does it hurt?"       Cramping lower pelvis x 3 days, intermittent    2  RADIATION: "Does the pain shoot anywhere else?" (e g , chest, back, shoulder)      Denies     3  ONSET: "When did the pain begin?" (e g , minutes, hours or days ago)       3 days ago    4  SEVERITY: "How bad is the pain?" "What does it keep you from doing?"  (e g , Scale 1-10; mild, moderate, or severe)    - MILD (1-3): doesn't interfere with normal activities, abdomen soft and not tender to touch     - MODERATE (4-7): interferes with normal activities or awakens from sleep, tender to touch     - SEVERE (8-10): excruciating pain, doubled over, unable to do any normal activities      Moderate    5  RECURRENT SYMPTOM: "Have you ever had this type of stomach pain before?" If Yes, ask: "When was the last time?" and "What happened that time?"       When she first became pregnant    6  CAUSE: "What do you think is causing the stomach pain?"      Unknown    7  RELIEVING/AGGRAVATING FACTORS: "What makes it better or worse?" (e g , antacids, bowel movement, movement)      Denies     8  OTHER SYMPTOMS: "Has there been any vaginal bleeding, fever, vomiting, diarrhea, or urine problems?"       Denies     9   RULA: "What date are you expecting to deliver?"      12/25/22    Protocols used: PREGNANCY - ABDOMINAL PAIN LESS THAN 20 WEEKS EGA-ADULT-AH

## 2022-06-26 NOTE — TELEPHONE ENCOUNTER
Regardin weeks / cramping for 3 days concern   ----- Message from [facility] sent at 2022  2:54 PM EDT -----  "I am calling because I am 14 weeks pregnant and maryann been having cramping for 3 days now and im starting to get nervous  "

## 2022-07-12 ENCOUNTER — TELEPHONE (OUTPATIENT)
Dept: OBGYN CLINIC | Facility: CLINIC | Age: 22
End: 2022-07-12

## 2022-07-12 NOTE — PROGRESS NOTES
Patient is here for prenatal ob visit today  C/o about a brown discharge that looked like a clot  GA: 16w5d  RULA: 2022    Urine: Protein Trace / Glucose Negative  Denies loss of fluid and contractions  Had spotting that was bright red on 7/10/2022  Good fetal movement  Declines having Covid vaccine

## 2022-07-12 NOTE — TELEPHONE ENCOUNTER
Incoming call from [facility] patient  Reports large amount of mucous discharge when voiding  Mostly white with a small tinge of brown  No cramping or contractions  Discussed increase in vaginal discharge in pregnancy is very normal as well as some spotting after intercourse or straining with bowel movement  Verbalizes understanding

## 2022-07-15 ENCOUNTER — ROUTINE PRENATAL (OUTPATIENT)
Dept: OBGYN CLINIC | Facility: CLINIC | Age: 22
End: 2022-07-15

## 2022-07-15 VITALS
WEIGHT: 160 LBS | BODY MASS INDEX: 26.66 KG/M2 | DIASTOLIC BLOOD PRESSURE: 72 MMHG | SYSTOLIC BLOOD PRESSURE: 110 MMHG | HEIGHT: 65 IN

## 2022-07-15 DIAGNOSIS — Z36.9 ANTENATAL SCREENING ENCOUNTER: ICD-10-CM

## 2022-07-15 DIAGNOSIS — Z3A.16 16 WEEKS GESTATION OF PREGNANCY: Primary | ICD-10-CM

## 2022-07-15 DIAGNOSIS — Z33.1 PREGNANT STATE, INCIDENTAL: ICD-10-CM

## 2022-07-15 LAB
SL AMB  POCT GLUCOSE, UA: NORMAL
SL AMB POCT URINE PROTEIN: ABNORMAL

## 2022-07-15 PROCEDURE — PNV

## 2022-07-15 NOTE — ASSESSMENT & PLAN NOTE
Nirmal Andre is a 24 y o   16w5d here for routine prenatal care  Prepregnancy BMI 25 with a goal weight gain 7 kg (15 lb)-11 5 kg (25 lb)  TWG 3 629 kg (8 lb)  Feels well  Denies LOF/CTX/VB  No concerns  Patient reports she had some bright red spotting 2 weeks ago but this has resolved since then  Reassured can be normal during pregnancy especially postcoital spotting  msAFP ordered  Discussed importance of ASA 162mg in reducing risk of preE,  birth, and IUGR   labor precautions reviewed  Encouraged adequate hydration and nutrition  Pregnancy Essential guide and Baby and Me center web site recommended

## 2022-07-15 NOTE — PATIENT INSTRUCTIONS
Pregnancy at 15 to 18 Weeks   AMBULATORY CARE:   What changes are happening to your body:  Now that you are in your second trimester, you have more energy  You may also feel hungrier than usual  You may start to experience other symptoms, such as heartburn or dizziness  You may be gaining about ½ to 1 pound a week, and your pregnancy is beginning to show  You may need to start wearing maternity clothes  Seek care immediately if:   You have pain or cramping in your abdomen or low back  You have heavy vaginal bleeding or clotting  You pass material that looks like tissue or large clots  Collect the material and bring it with you  Call your doctor or obstetrician if:   You cannot keep food or drinks down, and you are losing weight  You have light bleeding  You have chills or a fever  You have vaginal itching, burning, or pain  You have yellow, green, white, or foul-smelling vaginal discharge  You have pain or burning when you urinate, less urine than usual, or pink or bloody urine  You have questions or concerns about your condition or care  How to care for yourself at this stage of your pregnancy:       Manage heartburn  by eating 4 or 5 small meals each day instead of large meals  Avoid spicy foods  Avoid eating right before bedtime  Manage nausea and vomiting  Avoid fatty and spicy foods  Eat small meals throughout the day instead of large meals  Danni may help to decrease nausea  Ask your healthcare provider about other ways of decreasing nausea and vomiting  Eat a variety of healthy foods  Healthy foods include fruits, vegetables, whole-grain breads, low-fat dairy foods, beans, lean meats, and fish  Drink liquids as directed  Ask how much liquid to drink each day and which liquids are best for you  Limit caffeine to less than 200 milligrams each day  Limit your intake of fish to 2 servings each week   Choose fish low in mercury such as canned light tuna, shrimp, salmon, cod, or tilapia  Do not  eat fish high in mercury such as swordfish, tilefish, rell mackerel, and shark  Take prenatal vitamins as directed  Your need for certain vitamins and minerals, such as folic acid, increases during pregnancy  Prenatal vitamins provide some of the extra vitamins and minerals you need  Prenatal vitamins may also help to decrease the risk of certain birth defects  Do not smoke  Smoking increases your risk of a miscarriage and other health problems during your pregnancy  Smoking can cause your baby to be born too early or weigh less at birth  Ask your healthcare provider for information if you need help quitting  Do not drink alcohol  Alcohol passes from your body to your baby through the placenta  It can affect your baby's brain development and cause fetal alcohol syndrome (FAS)  FAS is a group of conditions that causes mental, behavior, and growth problems  Talk to your healthcare provider before you take any medicines  Many medicines may harm your baby if you take them when you are pregnant  Do not take any medicines, vitamins, herbs, or supplements without first talking to your healthcare provider  Never use illegal or street drugs (such as marijuana or cocaine) while you are pregnant  Safety tips during pregnancy:   Avoid hot tubs and saunas  Do not use a hot tub or sauna while you are pregnant, especially during your first trimester  Hot tubs and saunas may raise your baby's temperature and increase the risk of birth defects  Avoid toxoplasmosis  This is an infection caused by eating raw meat or being around infected cat feces  It can cause birth defects, miscarriages, and other problems  Wash your hands after you touch raw meat  Make sure any meat is well-cooked before you eat it  Avoid raw eggs and unpasteurized milk  Use gloves or ask someone else to clean your cat's litter box while you are pregnant      Changes that are happening with your baby:  By 23 weeks, your baby may be about 6 inches long from the top of the head to the rump (baby's bottom)  Your baby may weigh about 11 ounces  You may be able to feel your baby's movement at about 18 weeks or later  The first movements may not be that noticeable  They may feel like a fluttering sensation  Your baby also makes sucking movements and can hear certain sounds  What you need to know about prenatal care:  During the first 28 weeks of your pregnancy, you will see your healthcare provider once a month  Your healthcare provider will check your blood pressure and weight  You may also need any of the following:  A urine test  may also be done to check for sugar and protein  These can be signs of gestational diabetes or infection  A blood test  may be done to check for anemia (low iron level)  Fundal height check  is a measurement of your uterus to check your baby's growth  This number is usually the same as the number of weeks that you have been pregnant  An ultrasound  may be done to check your baby's development  Your healthcare provider may be able to tell you what your baby's gender is during the ultrasound  Your baby's heart rate  will be checked  © Copyright [employer] 2022 Information is for End User's use only and may not be sold, redistributed or otherwise used for commercial purposes  All illustrations and images included in CareNotes® are the copyrighted property of [employer]  or Kaity Hampton  The above information is an  only  It is not intended as medical advice for individual conditions or treatments  Talk to your doctor, nurse or pharmacist before following any medical regimen to see if it is safe and effective for you

## 2022-07-15 NOTE — PROGRESS NOTES
Problem   16 Weeks Gestation of Pregnancy    Blood Type: O positive  Antibody negative  Pap 2022  GC/CT negative  PN1 Labs: wnl Hgb: 11 8  28 Week Labs:  Level 1: 22  NIPS negative  msAFP pending  Level 2: 22  Delivery consent:   GBS swab:   IOL:   Breast pump order:   Vaccines:              16 weeks gestation of pregnancy  Amanda Ramirez is a 24 y o  Shivani Garb 16w5d here for routine prenatal care  Prepregnancy BMI 25 with a goal weight gain 7 kg (15 lb)-11 5 kg (25 lb)  TWG 3 629 kg (8 lb)  Feels well  Denies LOF/CTX/VB  No concerns  Patient reports she had some bright red spotting 2 weeks ago but this has resolved since then  Reassured can be normal during pregnancy especially postcoital spotting  msAFP ordered  Discussed importance of ASA 162mg in reducing risk of preE,  birth, and IUGR   labor precautions reviewed  Encouraged adequate hydration and nutrition  Pregnancy Essential guide and Baby and Me center web site recommended

## 2022-07-27 ENCOUNTER — APPOINTMENT (OUTPATIENT)
Dept: LAB | Facility: CLINIC | Age: 22
End: 2022-07-27
Payer: COMMERCIAL

## 2022-07-27 DIAGNOSIS — Z33.1 PREGNANT STATE, INCIDENTAL: ICD-10-CM

## 2022-07-27 DIAGNOSIS — Z36.9 ANTENATAL SCREENING ENCOUNTER: ICD-10-CM

## 2022-07-27 PROCEDURE — 36415 COLL VENOUS BLD VENIPUNCTURE: CPT

## 2022-07-27 PROCEDURE — 82105 ALPHA-FETOPROTEIN SERUM: CPT

## 2022-07-29 LAB
2ND TRIMESTER 4 SCREEN SERPL-IMP: NORMAL
AFP ADJ MOM SERPL: 0.97
AFP INTERP AMN-IMP: NORMAL
AFP INTERP SERPL-IMP: NORMAL
AFP INTERP SERPL-IMP: NORMAL
AFP SERPL-MCNC: 49.3 NG/ML
AGE AT DELIVERY: 23 YR
GA METHOD: NORMAL
GA: 18.4 WEEKS
IDDM PATIENT QL: NO
MULTIPLE PREGNANCY: NO
NEURAL TUBE DEFECT RISK FETUS: NORMAL %

## 2022-08-11 ENCOUNTER — ROUTINE PRENATAL (OUTPATIENT)
Dept: PERINATAL CARE | Facility: OTHER | Age: 22
End: 2022-08-11
Payer: COMMERCIAL

## 2022-08-11 VITALS
DIASTOLIC BLOOD PRESSURE: 70 MMHG | HEIGHT: 65 IN | HEART RATE: 98 BPM | WEIGHT: 160.6 LBS | SYSTOLIC BLOOD PRESSURE: 110 MMHG | BODY MASS INDEX: 26.76 KG/M2

## 2022-08-11 DIAGNOSIS — Z36.86 ENCOUNTER FOR ANTENATAL SCREENING FOR CERVICAL LENGTH: ICD-10-CM

## 2022-08-11 DIAGNOSIS — Z3A.20 20 WEEKS GESTATION OF PREGNANCY: Primary | ICD-10-CM

## 2022-08-11 DIAGNOSIS — Z36.89 ENCOUNTER FOR FETAL ANATOMIC SURVEY: ICD-10-CM

## 2022-08-11 PROCEDURE — 76805 OB US >/= 14 WKS SNGL FETUS: CPT | Performed by: OBSTETRICS & GYNECOLOGY

## 2022-08-11 PROCEDURE — 76817 TRANSVAGINAL US OBSTETRIC: CPT | Performed by: OBSTETRICS & GYNECOLOGY

## 2022-08-11 PROCEDURE — 99213 OFFICE O/P EST LOW 20 MIN: CPT | Performed by: OBSTETRICS & GYNECOLOGY

## 2022-08-11 NOTE — PATIENT INSTRUCTIONS
Please refer to " Ultrasound" under test results in MyChart for today's ultrasound findings  Congratulations, and best wishes for a healthy remainder of the pregnancy! Thank you for choosing [facility] for your visit today  We appreciate your trust and the opportunity to assist your obstetrician with your care  We value your feedback regarding the care we are providing  Following today's appointment, you may receive a patient satisfaction survey by mail or e-mail requesting feedback on your visit  We ask that you complete the survey to  help us understand how we are doing  Thank you for in advance for your feedback

## 2022-08-11 NOTE — PROGRESS NOTES
Ultrasound Probe Disinfection    A transvaginal ultrasound was performed  Prior to use, disinfection was performed with High Level Disinfection Process (Scytlon)  Probe serial number M2: [id number] was used        Coretha Form  08/11/22  12:58 PM

## 2022-08-11 NOTE — LETTER
August 11, 2022     [address] [facility] [address]    Patient: Cherri Juárez   YOB: 2000   Date of Visit: 8/11/2022       Dear Saqib Palma: Thank you for referring Cherri Juárez to me for evaluation  Below are my notes for this consultation  If you have questions, please do not hesitate to call me  I look forward to following your patient along with you  Sincerely,        Shaq Zaragoza MD        CC: No Recipients  Shaq Zaragoza MD  8/11/2022  1:31 PM  Sign when Signing Visit  [address]: Ms Tiffany Copeland was seen today at 20w4d for anatomic survey and cervical length screening ultrasound  See ultrasound report under "OB Procedures" tab    Please don't hesitate to contact our office with any concerns or questions   -Shaq Zaragoza MD

## 2022-08-11 NOTE — PROGRESS NOTES
[address]: Ms Anastasiia Avendaño was seen today at 20w4d for anatomic survey and cervical length screening ultrasound  See ultrasound report under "OB Procedures" tab    Please don't hesitate to contact our office with any concerns or questions   -Cr Medina MD

## 2022-08-30 ENCOUNTER — TELEPHONE (OUTPATIENT)
Dept: OBGYN CLINIC | Facility: CLINIC | Age: 22
End: 2022-08-30

## 2022-09-02 PROBLEM — Z3A.24 24 WEEKS GESTATION OF PREGNANCY: Status: ACTIVE | Noted: 2022-06-14

## 2022-09-22 ENCOUNTER — TELEPHONE (OUTPATIENT)
Dept: OBGYN CLINIC | Facility: CLINIC | Age: 22
End: 2022-09-22

## 2022-09-22 NOTE — TELEPHONE ENCOUNTER
Pt had her 28 week appt scheduled for 9/27 with [address]  Asked for later time, did not have any  Offered her different days, only could do the the 27th, wanted me to cancel  She also canceled her 20 and 24 week appt, and no showed to her 25 week appt   Has not been seen since 7/15 with Alejandra for 16 weeks     pls advise

## 2022-11-03 ENCOUNTER — ULTRASOUND (OUTPATIENT)
Dept: PERINATAL CARE | Facility: OTHER | Age: 22
End: 2022-11-03

## 2022-11-03 ENCOUNTER — TELEPHONE (OUTPATIENT)
Dept: PERINATAL CARE | Facility: OTHER | Age: 22
End: 2022-11-03

## 2022-11-03 VITALS
WEIGHT: 170 LBS | HEIGHT: 65 IN | DIASTOLIC BLOOD PRESSURE: 60 MMHG | SYSTOLIC BLOOD PRESSURE: 112 MMHG | HEART RATE: 90 BPM | BODY MASS INDEX: 28.32 KG/M2

## 2022-11-03 DIAGNOSIS — D25.9 UTERINE FIBROID IN PREGNANCY: ICD-10-CM

## 2022-11-03 DIAGNOSIS — O34.10 UTERINE FIBROID IN PREGNANCY: ICD-10-CM

## 2022-11-03 DIAGNOSIS — Z3A.32 32 WEEKS GESTATION OF PREGNANCY: Primary | ICD-10-CM

## 2022-11-03 NOTE — LETTER
November 9, 2022     Nicole Romo, [address]    Patient: Cristi Calhoun   YOB: 2000   Date of Visit: 11/3/2022       Dear Dr Carleene Angelucci: Thank you for referring Cristi Calhoun to me for evaluation  Below are my notes for this consultation  If you have questions, please do not hesitate to call me  I look forward to following your patient along with you  Sincerely,        Maury Hurtado MD        CC: No Recipients  Maury Hurtado MD  11/9/2022  3:58 PM  Sign when Signing Visit  A fetal ultrasound was completed  See Ob procedures in Epic for an interpretation and recommendations  Do not hesitate to contact us in [facility] if you have questions  Worthy McDermitt  Elicia Tompkins MD, [address] [place] [address]  Maternal Fetal Medicine

## 2022-11-03 NOTE — TELEPHONE ENCOUNTER
Patient is aware of recommended follow up of weekly NST/DEVON and doppler follow up  Patient stated that due to currently residing in [place] that the distance will be too hard for her to travel weekly  Patient made one of her appointments and will make more appointments when she comes 11/14/22

## 2022-11-08 ENCOUNTER — HOSPITAL ENCOUNTER (OUTPATIENT)
Facility: HOSPITAL | Age: 22
Discharge: HOME | End: 2022-11-08
Attending: STUDENT IN AN ORGANIZED HEALTH CARE EDUCATION/TRAINING PROGRAM | Admitting: STUDENT IN AN ORGANIZED HEALTH CARE EDUCATION/TRAINING PROGRAM

## 2022-11-08 ENCOUNTER — HOSPITAL ENCOUNTER (OUTPATIENT)
Facility: HOSPITAL | Age: 22
End: 2022-11-08
Admitting: STUDENT IN AN ORGANIZED HEALTH CARE EDUCATION/TRAINING PROGRAM

## 2022-11-08 ENCOUNTER — TELEPHONE (OUTPATIENT)
Dept: OBGYN CLINIC | Facility: CLINIC | Age: 22
End: 2022-11-08

## 2022-11-08 VITALS — HEART RATE: 88 BPM | OXYGEN SATURATION: 98 % | SYSTOLIC BLOOD PRESSURE: 109 MMHG | DIASTOLIC BLOOD PRESSURE: 65 MMHG

## 2022-11-08 NOTE — TELEPHONE ENCOUNTER
Pt is 33w, 2d, 0pos  Pt states has had no fm since last night until 2 hrs ago when she felt a tap    Apt sched tomorrow, last seen 7/15 at 16 wks  Was seen at [facility] 11/3  Gave instruct re kick counts and sugary drink  Per ct, to triage  lr informed

## 2022-11-09 ENCOUNTER — ROUTINE PRENATAL (OUTPATIENT)
Dept: OBGYN CLINIC | Facility: CLINIC | Age: 22
End: 2022-11-09

## 2022-11-09 ENCOUNTER — TELEPHONE (OUTPATIENT)
Dept: PERINATAL CARE | Facility: CLINIC | Age: 22
End: 2022-11-09

## 2022-11-09 VITALS
WEIGHT: 173 LBS | HEIGHT: 65 IN | BODY MASS INDEX: 28.82 KG/M2 | SYSTOLIC BLOOD PRESSURE: 100 MMHG | DIASTOLIC BLOOD PRESSURE: 78 MMHG

## 2022-11-09 DIAGNOSIS — D57.3 SICKLE CELL TRAIT IN MOTHER AFFECTING PREGNANCY (HCC): ICD-10-CM

## 2022-11-09 DIAGNOSIS — Z34.03 PRENATAL CARE, FIRST PREGNANCY IN THIRD TRIMESTER: Primary | ICD-10-CM

## 2022-11-09 DIAGNOSIS — Z91.199 NONCOMPLIANT PREGNANT PATIENT IN THIRD TRIMESTER: ICD-10-CM

## 2022-11-09 DIAGNOSIS — Z11.3 SCREEN FOR STD (SEXUALLY TRANSMITTED DISEASE): ICD-10-CM

## 2022-11-09 DIAGNOSIS — O34.10 UTERINE FIBROID IN PREGNANCY: ICD-10-CM

## 2022-11-09 DIAGNOSIS — D25.9 UTERINE FIBROID IN PREGNANCY: ICD-10-CM

## 2022-11-09 DIAGNOSIS — O99.019 SICKLE CELL TRAIT IN MOTHER AFFECTING PREGNANCY (HCC): ICD-10-CM

## 2022-11-09 DIAGNOSIS — O09.893 NONCOMPLIANT PREGNANT PATIENT IN THIRD TRIMESTER: ICD-10-CM

## 2022-11-09 PROBLEM — Z3A.32 32 WEEKS GESTATION OF PREGNANCY: Status: ACTIVE | Noted: 2022-06-14

## 2022-11-09 LAB
SL AMB  POCT GLUCOSE, UA: NORMAL
SL AMB POCT URINE PROTEIN: NORMAL

## 2022-11-09 NOTE — PROGRESS NOTES
L&D Triage Note - OB/GYN  Ceferino Torres 24 y o  female MRN: [id number]  Unit/Bed#: LD TRIAGE 4-01 Encounter: [id number]      ASSESSMENT/PLAN  Ceferino Torres is a 24 y o  Oziel Coleman at 33w2d presents with decreased fetal movement  1) Decreased fetal movement  - Patient felt fetal movement 3-4 times in triage   - TAUS: movement present during US , DEVON 9 1cm  - FHT reactive    Discharge instructions  - Patient instructed to call if experiencing worsening contractions, vaginal bleeding, loss of fluid or decreased fetal movement  - Encouraged patient to monitor fetal kick counts  - Will follow up with OBGYN in office      She is a patient of Rashawn Deleon  D/w Dr Agata Dueñas, on call OBGYN Attending Physician  ______________    SUBJECTIVE    RULA: Estimated Date of Delivery: 12/25/22    HPI:  24 y o  Oziel Coleman 33w2d presents with complaint of decreased fetal movement this afternoon/evening  Pt noted that her baby is usually very active during the day but had only felt her baby once since 2pm  While in triage, she felt her baby move 3-4 times  Contractions: none  Leakage of fluid: none  Vaginal Bleeding: none  Fetal movement: present in triage    Her obstetrical history is significant for sickle cell trait in patient and FOB    ROS:  Constitutional: Negative  Respiratory: Negative  Cardiovascular: Negative    Gastrointestinal: Negative    Physical Exam  General: Well appearing, no distress  Respiratory: Unlabored breathing  Cardiovascular: Regular rate  Abdomen: Soft, gravid, nontender   Fundal Height: Appropriate for gestational age  Extremities: Warm and well perfused  Non tender  OBJECTIVE:  /65   Pulse 88   LMP 03/20/2022   SpO2 98%   There is no height or weight on file to calculate BMI  Labs: No results found for this or any previous visit (from the past 24 hour(s))      FHT:   baseline, moderate variability    TOCO:    no contractions present    IMAGING:        TAUS   DEVON      - Q1 2 7cm     - Q2 3 2 cm     - Q3 1 7cm     - Q4 1 5cm     - Total: 9 1 cm   Placenta: Anterior   Presentation: [address]  FM PGY1  11/8/2022  8:21 PM      Portions of the record may have been created with voice recognition software  Occasional wrong word or "sound a like" substitutions may have occurred due to the inherent limitations of voice recognition software    Read the chart carefully and recognize, using context, where substitutions have occurred

## 2022-11-09 NOTE — PROGRESS NOTES
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

## 2022-11-09 NOTE — PROCEDURES
Bakari Ji, a  at 33w2d with an RULA of 2022, by Last Menstrual Period, was seen at [address] for the following procedure(s): $Procedure Type: DEVON]         4 Quadrant DEVON  DEVON Q1 (cm): 2 7 cm  DEVON Q2 (cm): 3 2 cm  DEVON Q3 (cm): 1 7 cm  DEVON Q4 (cm): 1 5 cm  DEVON TOTAL (cm): 9 1 cm  Vertex, anterior placenta     [facility]

## 2022-11-09 NOTE — LETTER
November 9, 2022     Patient: Maurizio Bourne  YOB: 2000  Date of Visit: 11/9/2022      To Whom it May Concern:    Maurizio Bourne is under my professional care  Allie Sanz was seen in my office on 11/9/2022  Allie Sanz must be excused from work to be able to attend her prenatal visits  If you have any questions or concerns, please don't hesitate to call           Sincerely,          Phyllis Freed MD

## 2022-11-09 NOTE — PROGRESS NOTES
A fetal ultrasound was completed  See Ob procedures in Epic for an interpretation and recommendations  Do not hesitate to contact us in [facility] if you have questions  Charlie Santos MD, [facility]  Maternal Fetal Medicine

## 2022-11-09 NOTE — TELEPHONE ENCOUNTER
Called pt to schedule follow up ultrasound from 11/3/22 appt  LVM notifying pt I cancelled her schedule growth ultrasound on 11/14/22 and changed her non stress test and fluid to a growth and non stress test on 11/21/22 per Dr Renata Pfeiffer recommendation for a 3 week follow up  Told pt to call us back to confirm she received the message or if she has any questions

## 2022-11-09 NOTE — PROGRESS NOTES
Patient presents for a routine prenatal visit    33W3D  Good Fetal Movement  No LOF,bleeding,discharge or cramping  was seen yesterday at L&D for decreased fetal movement    Urine: -/-    Yellow folder given to patient and reviewed at today's visit  Plans on breastfeeding, already received breast pump through insurance  Pediatrician referral ordered today  28 week labs ordered today  Nuchal u/s:   Anatomy u/s:   Declined Tdap and flu vaccines

## 2022-11-14 ENCOUNTER — APPOINTMENT (OUTPATIENT)
Dept: LAB | Facility: HOSPITAL | Age: 22
End: 2022-11-14

## 2022-11-14 ENCOUNTER — APPOINTMENT (OUTPATIENT)
Dept: LAB | Facility: CLINIC | Age: 22
End: 2022-11-14

## 2022-11-14 DIAGNOSIS — Z34.03 PRENATAL CARE, FIRST PREGNANCY IN THIRD TRIMESTER: ICD-10-CM

## 2022-11-14 DIAGNOSIS — Z11.3 SCREEN FOR STD (SEXUALLY TRANSMITTED DISEASE): ICD-10-CM

## 2022-11-15 ENCOUNTER — APPOINTMENT (OUTPATIENT)
Dept: LAB | Facility: HOSPITAL | Age: 22
End: 2022-11-15

## 2022-11-15 ENCOUNTER — TELEPHONE (OUTPATIENT)
Dept: OBGYN CLINIC | Facility: CLINIC | Age: 22
End: 2022-11-15

## 2022-11-15 LAB
BASOPHILS # BLD AUTO: 0.05 THOUSANDS/ÂΜL (ref 0–0.1)
BASOPHILS NFR BLD AUTO: 0 % (ref 0–1)
EOSINOPHIL # BLD AUTO: 0.15 THOUSAND/ÂΜL (ref 0–0.61)
EOSINOPHIL NFR BLD AUTO: 1 % (ref 0–6)
ERYTHROCYTE [DISTWIDTH] IN BLOOD BY AUTOMATED COUNT: 13.5 % (ref 11.6–15.1)
FERRITIN SERPL-MCNC: 8 NG/ML (ref 8–388)
HCT VFR BLD AUTO: 33.2 % (ref 34.8–46.1)
HGB BLD-MCNC: 10.2 G/DL (ref 11.5–15.4)
IMM GRANULOCYTES # BLD AUTO: 0.26 THOUSAND/UL (ref 0–0.2)
IMM GRANULOCYTES NFR BLD AUTO: 2 % (ref 0–2)
LYMPHOCYTES # BLD AUTO: 1.71 THOUSANDS/ÂΜL (ref 0.6–4.47)
LYMPHOCYTES NFR BLD AUTO: 14 % (ref 14–44)
MCH RBC QN AUTO: 25.8 PG (ref 26.8–34.3)
MCHC RBC AUTO-ENTMCNC: 30.7 G/DL (ref 31.4–37.4)
MCV RBC AUTO: 84 FL (ref 82–98)
MONOCYTES # BLD AUTO: 0.87 THOUSAND/ÂΜL (ref 0.17–1.22)
MONOCYTES NFR BLD AUTO: 7 % (ref 4–12)
NEUTROPHILS # BLD AUTO: 9.61 THOUSANDS/ÂΜL (ref 1.85–7.62)
NEUTS SEG NFR BLD AUTO: 76 % (ref 43–75)
NRBC BLD AUTO-RTO: 0 /100 WBCS
PLATELET # BLD AUTO: 316 THOUSANDS/UL (ref 149–390)
PMV BLD AUTO: 10.8 FL (ref 8.9–12.7)
RBC # BLD AUTO: 3.95 MILLION/UL (ref 3.81–5.12)
RPR SER QL: NORMAL
WBC # BLD AUTO: 12.65 THOUSAND/UL (ref 4.31–10.16)

## 2022-11-15 NOTE — TELEPHONE ENCOUNTER
----- Message from Jose Alfredo Biggs MD sent at 11/15/2022 10:40 AM EST -----  Please notify iron deficiency anemia  Should start daily iron supplement  And we'll recheck in 1 month  Also, still needs to complete 1 hr glucose   thanks

## 2022-11-17 PROBLEM — Z3A.35 35 WEEKS GESTATION OF PREGNANCY: Status: ACTIVE | Noted: 2022-06-14

## 2022-11-17 NOTE — ASSESSMENT & PLAN NOTE
She feels well  Here with her mom, Violet Arroyos  She denies LOF/CTX/VB  She voiced no concerns  Discussed fetal kick counting  She was encouraged to start with her perineal/vaginal massages to prevent lacerations during the labor process

## 2022-11-17 NOTE — PATIENT INSTRUCTIONS
Pregnancy at 28 to 38 Weeks   AMBULATORY CARE:   Changes happening with your body: You are considered full term at the beginning of 37 weeks  Your breathing may be easier if your baby has moved down into a head-down position  You may need to urinate more often because the baby may be pressing on your bladder  You may also feel more discomfort and get tired easily  Seek care immediately if:   You develop a severe headache that does not go away  You have new or increased vision changes, such as blurred or spotted vision  You have new or increased swelling in your face or hands  You have vaginal spotting or bleeding  Your water broke or you feel warm water gushing or trickling from your vagina  Call your obstetrician if:   You have more than 5 contractions in 1 hour  You notice any changes in your baby's movements  You have abdominal cramps, pressure, or tightening  You have a change in vaginal discharge  You have chills or a fever  You have vaginal itching, burning, or pain  You have yellow, green, white, or foul-smelling vaginal discharge  You have pain or burning when you urinate, less urine than usual, or pink or bloody urine  You have questions or concerns about your condition or care  How to care for yourself at this stage of your pregnancy:       Eat a variety of healthy foods  Healthy foods include fruits, vegetables, whole-grain breads, low-fat dairy foods, beans, lean meats, and fish  Drink liquids as directed  Ask how much liquid to drink each day and which liquids are best for you  Limit caffeine to less than 200 milligrams each day  Limit your intake of fish to 2 servings each week  Choose fish low in mercury such as canned light tuna, shrimp, salmon, cod, or tilapia  Do not  eat fish high in mercury such as swordfish, tilefish, rell mackerel, and shark  Take prenatal vitamins as directed    Your need for certain vitamins and minerals, such as folic acid, increases during pregnancy  Prenatal vitamins provide some of the extra vitamins and minerals you need  Prenatal vitamins may also help to decrease the risk of certain birth defects  Rest as needed  Put your feet up if you have swelling in your ankles and feet  Talk to your healthcare provider about exercise  Moderate exercise can help you stay fit  Your healthcare provider will help you plan an exercise program that is safe for you during pregnancy  Do not smoke  Smoking increases your risk of a miscarriage and other health problems during your pregnancy  Smoking can cause your baby to be born early or weigh less at birth  Ask your healthcare provider for information if you need help quitting  Do not drink alcohol  Alcohol passes from your body to your baby through the placenta  It can affect your baby's brain development and cause fetal alcohol syndrome (FAS)  FAS is a group of conditions that causes mental, behavior, and growth problems  Talk to your healthcare provider before you take any medicines  Many medicines may harm your baby if you take them when you are pregnant  Do not take any medicines, vitamins, herbs, or supplements without first talking to your healthcare provider  Never use illegal or street drugs (such as marijuana or cocaine) while you are pregnant  Safety tips during pregnancy:   Avoid hot tubs and saunas  Do not use a hot tub or sauna while you are pregnant, especially during your first trimester  Hot tubs and saunas may raise your baby's temperature and increase the risk of birth defects  Avoid toxoplasmosis  This is an infection caused by eating raw meat or being around infected cat feces  It can cause birth defects, miscarriages, and other problems  Wash your hands after you touch raw meat  Make sure any meat is well-cooked before you eat it  Avoid raw eggs and unpasteurized milk   Use gloves or ask someone else to clean your cat's litter box while you are pregnant  Ask your healthcare provider about travel  The most comfortable time to travel is during the second trimester  Ask your provider if you can travel after 36 weeks  You may not be able to travel in an airplane after 36 weeks  He or she may also recommend you avoid long road trips  Changes happening with your baby:  By 38 weeks, your baby may weigh between 6 and 9 pounds  Your baby may be about 14 inches long from the top of the head to the rump (baby's bottom)  Your baby hears well enough to know your voice  As your baby gets larger, you may feel fewer kicks and more stretching and rolling  Your baby may move into a head-down position  Your baby will also rest lower in your abdomen  What you need to know about prenatal care: Your healthcare provider will check your blood pressure and weight  You may also need the following:  A urine test  may also be done to check for sugar and protein  These can be signs of gestational diabetes or infection  Protein in your urine may also be a sign of preeclampsia  Preeclampsia is a condition that can develop during week 20 or later of your pregnancy  It causes high blood pressure, and it can cause problems with your kidneys and other organs  A gestational diabetes screen  may be done  Your healthcare provider may order either a 1-step or 2-step oral glucose tolerance test (OGTT)  1-step OGTT:  Your blood sugar level will be tested after you have not eaten for 8 hours (fasting)  You will then be given a glucose drink  Your level will be tested again 1 hour and 2 hours after you finish the drink  2-step OGTT:  You do not have to fast for the first part of the test  You will have the glucose drink at any time of day  Your blood sugar level will be checked 1 hour later  If your blood sugar is higher than a certain level, another test will be ordered  You will fast and your blood sugar level will be tested  You will have the glucose drink  Your blood will be tested again 1 hour, 2 hours, and 3 hours after you finish the glucose drink  A blood test  may be done to check for anemia (low iron level)  A Tdap vaccine  may be recommended by your healthcare provider  A group B strep test  is a test that is done to check for group B strep infection  Group B strep is a type of bacteria that may be found in the vagina or rectum  It can be passed to your baby during delivery if you have it  Your healthcare provider will take swab your vagina or rectum and send the sample to the lab for tests  Fundal height  is a measurement of your uterus to check your baby's growth  This number is usually the same as the number of weeks that you have been pregnant  Your healthcare provider may also check your baby's position  Your baby's heart rate  will be checked  Follow up with your obstetrician as directed:  Write down your questions so you remember to ask them during your visits  © Copyright [employer] 2022 Information is for End User's use only and may not be sold, redistributed or otherwise used for commercial purposes  All illustrations and images included in CareNotes® are the copyrighted property of [employer]  or [facility] [employer] [employer]   The above information is an  only  It is not intended as medical advice for individual conditions or treatments  Talk to your doctor, nurse or pharmacist before following any medical regimen to see if it is safe and effective for you

## 2022-11-20 NOTE — PROGRESS NOTES
Iraj Guzman has no complaints today  She reports regular fetal movements and does not report any problems  She is here today at 34w6d for an ultrasound for fetal growth and to review the fetal profile that was not cleared on prior scans  She has not completed her glucola scan yet  Problem list:  1  Fetal AC in the 10th % on last scan  2  Fibroid Subserosal located in the left lateral corpus region and measured 7 2 x 4 2 x 5 7cm on her last scan  3  Hgb 10 2 with a ferritin of 8 and started on iron on 11/15/22  Ultrasound findings: The ultrasound today shows a fetus with normal growth and the [facility] is in the 18%  An incidental BPP was 8/8  The subserosal fibroid was slightly larger at 7 9 x 4 9 x 7 3 cms  It was nontender during her scan  Pregnancy ultrasound has limitations and is unable to detect all forms of fetal congenital abnormalities  The inaccuracy in the EFW can be off by 1 lb either way in the third trimester  Follow up recommended:   1  Needs to complete her glucola  2  No further ultrasounds for growth are recommended at this time  3  She can stop all fetal testing including todays NST since her fetus is no longer growth restricted  4  We discussed that fibroids that are not causing a problem in pregnancy and are not causing a problem with periods do not need to be removed  Patient's though who have fibroids have an increased risk for them to get larger and to develop more which could compromise future pregnancies  For this reason would recommend future pregnancies be planned and completed before she develops multiple fibroids that could complicate future pregnancies  Recommend spacing pregnancies at least 1 year apart  5  Large fibroids may be associated with bleeding at the time of delivery or irregular and heavy bleeding with periods  She currently is on iron to improve her anemia       Pre visit time reviewing her records   10 minutes  Face to face time 10 minutes  Post visit time on documentation of note, updating her problem list, adding orders and prescriptions 5 minutes  Procedures that were completed today were charged separately  The level of decision making was low level complexity      John Paula MD

## 2022-11-21 ENCOUNTER — APPOINTMENT (OUTPATIENT)
Dept: LAB | Facility: CLINIC | Age: 22
End: 2022-11-21

## 2022-11-21 ENCOUNTER — ROUTINE PRENATAL (OUTPATIENT)
Dept: OBGYN CLINIC | Facility: CLINIC | Age: 22
End: 2022-11-21

## 2022-11-21 ENCOUNTER — ULTRASOUND (OUTPATIENT)
Dept: PERINATAL CARE | Facility: OTHER | Age: 22
End: 2022-11-21

## 2022-11-21 VITALS
DIASTOLIC BLOOD PRESSURE: 68 MMHG | HEIGHT: 65 IN | SYSTOLIC BLOOD PRESSURE: 118 MMHG | HEART RATE: 72 BPM | WEIGHT: 173.2 LBS | BODY MASS INDEX: 28.86 KG/M2

## 2022-11-21 VITALS — BODY MASS INDEX: 28.79 KG/M2 | SYSTOLIC BLOOD PRESSURE: 108 MMHG | WEIGHT: 173 LBS | DIASTOLIC BLOOD PRESSURE: 76 MMHG

## 2022-11-21 DIAGNOSIS — O34.10 UTERINE FIBROID IN PREGNANCY: ICD-10-CM

## 2022-11-21 DIAGNOSIS — Z34.03 PRENATAL CARE, FIRST PREGNANCY IN THIRD TRIMESTER: ICD-10-CM

## 2022-11-21 DIAGNOSIS — D25.9 UTERINE FIBROID IN PREGNANCY: ICD-10-CM

## 2022-11-21 DIAGNOSIS — Z34.03 PRENATAL CARE, FIRST PREGNANCY IN THIRD TRIMESTER: Primary | ICD-10-CM

## 2022-11-21 DIAGNOSIS — O99.019 SICKLE CELL TRAIT IN MOTHER AFFECTING PREGNANCY (HCC): ICD-10-CM

## 2022-11-21 DIAGNOSIS — O09.893 NONCOMPLIANT PREGNANT PATIENT IN THIRD TRIMESTER: ICD-10-CM

## 2022-11-21 DIAGNOSIS — Z91.199 NONCOMPLIANT PREGNANT PATIENT IN THIRD TRIMESTER: ICD-10-CM

## 2022-11-21 DIAGNOSIS — Z03.74 SUSPECTED PROBLEM WITH FETAL GROWTH NOT FOUND: Primary | ICD-10-CM

## 2022-11-21 DIAGNOSIS — Z3A.35 35 WEEKS GESTATION OF PREGNANCY: ICD-10-CM

## 2022-11-21 DIAGNOSIS — D57.3 SICKLE CELL TRAIT IN MOTHER AFFECTING PREGNANCY (HCC): ICD-10-CM

## 2022-11-21 DIAGNOSIS — F32.A DEPRESSION DURING PREGNANCY IN FIRST TRIMESTER: ICD-10-CM

## 2022-11-21 DIAGNOSIS — Z23 NEED FOR TDAP VACCINATION: ICD-10-CM

## 2022-11-21 DIAGNOSIS — O99.341 DEPRESSION DURING PREGNANCY IN FIRST TRIMESTER: ICD-10-CM

## 2022-11-21 LAB
GLUCOSE 1H P 50 G GLC PO SERPL-MCNC: 89 MG/DL (ref 40–134)
SL AMB  POCT GLUCOSE, UA: NORMAL
SL AMB POCT URINE PROTEIN: NORMAL

## 2022-11-21 NOTE — LETTER
November 21, 2022     Savanna Fonseca MD  [address]    Patient: Bakari Ji   YOB: 2000   Date of Visit: 11/21/2022       Dear Dr Ruben Lee: Thank you for referring Bakari Ji to me for evaluation  Below are my notes for this consultation  If you have questions, please do not hesitate to call me  I look forward to following your patient along with you  Sincerely,        Gt Marshall MD        CC: No Recipients  Gt Marshall MD  11/21/2022  8:01 PM  Sign when Signing Visit  Bakari Ji has no complaints today  She reports regular fetal movements and does not report any problems  She is here today at 34w6d for an ultrasound for fetal growth and to review the fetal profile that was not cleared on prior scans  She has not completed her glucola scan yet  Problem list:  1  Fetal AC in the 10th % on last scan  2  Fibroid Subserosal located in the left lateral corpus region and measured 7 2 x 4 2 x 5 7cm on her last scan  3  Hgb 10 2 with a ferritin of 8 and started on iron on 11/15/22  Ultrasound findings: The ultrasound today shows a fetus with normal growth and the [facility] is in the 18%  An incidental BPP was 8/8  The subserosal fibroid was slightly larger at 7 9 x 4 9 x 7 3 cms  It was nontender during her scan  Pregnancy ultrasound has limitations and is unable to detect all forms of fetal congenital abnormalities  The inaccuracy in the EFW can be off by 1 lb either way in the third trimester  Follow up recommended:   1  Needs to complete her glucola  2  No further ultrasounds for growth are recommended at this time  3  She can stop all fetal testing including todays NST since her fetus is no longer growth restricted  4  We discussed that fibroids that are not causing a problem in pregnancy and are not causing a problem with periods do not need to be removed    Patient's though who have fibroids have an increased risk for them to get larger and to develop more which could compromise future pregnancies  For this reason would recommend future pregnancies be planned and completed before she develops multiple fibroids that could complicate future pregnancies  Recommend spacing pregnancies at least 1 year apart  5  Large fibroids may be associated with bleeding at the time of delivery or irregular and heavy bleeding with periods  She currently is on iron to improve her anemia  Pre visit time reviewing her records   10 minutes  Face to face time 10 minutes  Post visit time on documentation of note, updating her problem list, adding orders and prescriptions 5 minutes  Procedures that were completed today were charged separately  The level of decision making was low level complexity      John Paula MD

## 2022-11-21 NOTE — LETTER
NST sleeve cover sheet    Patient name: Nasrin Temple  : 2000  MRN: [id number]    RULA: Estimated Date of Delivery: 22    Obstetrician: ___________SLOGA___________    Reason(s) for testing:  ___________________FGR________________      Testing frequency:    ___ 2x/wk  _x_ 1x/wk  _x_ Dopplers  ___ BPP?       Last growth scan: __________________________________________

## 2022-11-21 NOTE — PROGRESS NOTES
Problem   35 Weeks Gestation of Pregnancy    Blood Type: O positive  Antibody negative  Pap 06/13/2022  GC/CT negative  PN1 Labs: wnl Hgb: 11 8, repeat was 10 1    Level 1: 6/17/22  NIPS negative  msAFP neg  Level 2: 8/11/22- normal FG  Repeat growth scan on 11/21  ASA was never started, Iron was never taken  1*GTT NOT done  Delivery consent: signed  GBS swab:   IOL:   Breast pump order: done  Vaccines:              35 weeks gestation of pregnancy  She feels well  Here with her mom, Karyn Mosstock  She denies LOF/CTX/VB  She voiced no concerns  Discussed fetal kick counting  She was encouraged to start with her perineal/vaginal massages to prevent lacerations during the labor process

## 2022-11-21 NOTE — PROGRESS NOTES
Pt is feeling fetal movement no LOF or vaginal bleeding  TDAP administered in RD pt had no questions or concerns or adverse reactions   Declined Flu Vaccine and Received a Breast pump threw her insurance

## 2022-11-23 ENCOUNTER — NURSE TRIAGE (OUTPATIENT)
Dept: OTHER | Facility: OTHER | Age: 22
End: 2022-11-23

## 2022-11-24 ENCOUNTER — HOSPITAL ENCOUNTER (INPATIENT)
Facility: HOSPITAL | Age: 22
LOS: 3 days | Discharge: HOME | End: 2022-11-27
Attending: STUDENT IN AN ORGANIZED HEALTH CARE EDUCATION/TRAINING PROGRAM | Admitting: STUDENT IN AN ORGANIZED HEALTH CARE EDUCATION/TRAINING PROGRAM

## 2022-11-24 DIAGNOSIS — Z3A.35 35 WEEKS GESTATION OF PREGNANCY: Primary | ICD-10-CM

## 2022-11-24 PROBLEM — O42.919 PRETERM PREMATURE RUPTURE OF MEMBRANES (PPROM) WITH UNKNOWN ONSET OF LABOR: Status: ACTIVE | Noted: 2022-11-24

## 2022-11-24 PROBLEM — O42.919 PRETERM PREMATURE RUPTURE OF MEMBRANES (PPROM) WITH UNKNOWN ONSET OF LABOR: Status: RESOLVED | Noted: 2022-11-24 | Resolved: 2022-11-24

## 2022-11-24 LAB
ABO GROUP BLD: NORMAL
AMPHETAMINES SERPL QL SCN: NEGATIVE
BARBITURATES UR QL: NEGATIVE
BASOPHILS # BLD MANUAL: 0 THOUSAND/UL (ref 0–0.1)
BASOPHILS NFR MAR MANUAL: 0 % (ref 0–1)
BENZODIAZ UR QL: NEGATIVE
BLD GP AB SCN SERPL QL: NEGATIVE
COCAINE UR QL: NEGATIVE
EOSINOPHIL # BLD MANUAL: 0 THOUSAND/UL (ref 0–0.4)
EOSINOPHIL NFR BLD MANUAL: 0 % (ref 0–6)
ERYTHROCYTE [DISTWIDTH] IN BLOOD BY AUTOMATED COUNT: 13.8 % (ref 11.6–15.1)
HCT VFR BLD AUTO: 32.4 % (ref 34.8–46.1)
HGB BLD-MCNC: 10.2 G/DL (ref 11.5–15.4)
LG PLATELETS BLD QL SMEAR: PRESENT
LYMPHOCYTES # BLD AUTO: 1.61 THOUSAND/UL (ref 0.6–4.47)
LYMPHOCYTES # BLD AUTO: 12 % (ref 14–44)
MCH RBC QN AUTO: 26.2 PG (ref 26.8–34.3)
MCHC RBC AUTO-ENTMCNC: 31.5 G/DL (ref 31.4–37.4)
MCV RBC AUTO: 83 FL (ref 82–98)
METHADONE UR QL: NEGATIVE
MONOCYTES # BLD AUTO: 0.54 THOUSAND/UL (ref 0–1.22)
MONOCYTES NFR BLD: 4 % (ref 4–12)
MYELOCYTES NFR BLD MANUAL: 1 % (ref 0–1)
NEUTROPHILS # BLD MANUAL: 11.13 THOUSAND/UL (ref 1.85–7.62)
NEUTS BAND NFR BLD MANUAL: 3 % (ref 0–8)
NEUTS SEG NFR BLD AUTO: 80 % (ref 43–75)
OPIATES UR QL SCN: NEGATIVE
OXYCODONE+OXYMORPHONE UR QL SCN: NEGATIVE
PCP UR QL: NEGATIVE
PLATELET # BLD AUTO: 264 THOUSANDS/UL (ref 149–390)
PLATELET BLD QL SMEAR: ADEQUATE
PMV BLD AUTO: 10.4 FL (ref 8.9–12.7)
RBC # BLD AUTO: 3.9 MILLION/UL (ref 3.81–5.12)
RH BLD: POSITIVE
RPR SER QL: NORMAL
SPECIMEN EXPIRATION DATE: NORMAL
THC UR QL: NEGATIVE
WBC # BLD AUTO: 13.41 THOUSAND/UL (ref 4.31–10.16)

## 2022-11-24 PROCEDURE — 4A1HXCZ MONITORING OF PRODUCTS OF CONCEPTION, CARDIAC RATE, EXTERNAL APPROACH: ICD-10-PCS | Performed by: OBSTETRICS & GYNECOLOGY

## 2022-11-24 PROCEDURE — 10H07YZ INSERTION OF OTHER DEVICE INTO PRODUCTS OF CONCEPTION, VIA NATURAL OR ARTIFICIAL OPENING: ICD-10-PCS | Performed by: OBSTETRICS & GYNECOLOGY

## 2022-11-24 RX ORDER — SODIUM CHLORIDE, SODIUM LACTATE, POTASSIUM CHLORIDE, CALCIUM CHLORIDE 600; 310; 30; 20 MG/100ML; MG/100ML; MG/100ML; MG/100ML
125 INJECTION, SOLUTION INTRAVENOUS CONTINUOUS
Status: DISCONTINUED | OUTPATIENT
Start: 2022-11-24 | End: 2022-11-25

## 2022-11-24 RX ORDER — OXYTOCIN/RINGER'S LACTATE 30/500 ML
1-30 PLASTIC BAG, INJECTION (ML) INTRAVENOUS
Status: DISCONTINUED | OUTPATIENT
Start: 2022-11-24 | End: 2022-11-25

## 2022-11-24 RX ORDER — BUTORPHANOL TARTRATE 1 MG/ML
1 INJECTION, SOLUTION INTRAMUSCULAR; INTRAVENOUS
Status: DISCONTINUED | OUTPATIENT
Start: 2022-11-24 | End: 2022-11-25

## 2022-11-24 RX ORDER — BETAMETHASONE SODIUM PHOSPHATE AND BETAMETHASONE ACETATE 3; 3 MG/ML; MG/ML
12 INJECTION, SUSPENSION INTRA-ARTICULAR; INTRALESIONAL; INTRAMUSCULAR; SOFT TISSUE ONCE
Status: COMPLETED | OUTPATIENT
Start: 2022-11-24 | End: 2022-11-24

## 2022-11-24 RX ORDER — ONDANSETRON 2 MG/ML
4 INJECTION INTRAMUSCULAR; INTRAVENOUS EVERY 4 HOURS PRN
Status: DISCONTINUED | OUTPATIENT
Start: 2022-11-24 | End: 2022-11-25

## 2022-11-24 RX ADMIN — SODIUM CHLORIDE, POTASSIUM CHLORIDE, SODIUM LACTATE AND CALCIUM CHLORIDE 125 ML/HR: 600; 310; 30; 20 INJECTION, SOLUTION INTRAVENOUS at 15:47

## 2022-11-24 RX ADMIN — SODIUM CHLORIDE 2.5 MILLION UNITS: 900 INJECTION, SOLUTION INTRAVENOUS at 15:47

## 2022-11-24 RX ADMIN — BUTORPHANOL TARTRATE 1 MG: 1 INJECTION, SOLUTION INTRAMUSCULAR; INTRAVENOUS at 23:25

## 2022-11-24 RX ADMIN — Medication 2 MILLI-UNITS/MIN: at 11:00

## 2022-11-24 RX ADMIN — SODIUM CHLORIDE, POTASSIUM CHLORIDE, SODIUM LACTATE AND CALCIUM CHLORIDE 125 ML/HR: 600; 310; 30; 20 INJECTION, SOLUTION INTRAVENOUS at 07:51

## 2022-11-24 RX ADMIN — SODIUM CHLORIDE 2.5 MILLION UNITS: 900 INJECTION, SOLUTION INTRAVENOUS at 12:01

## 2022-11-24 RX ADMIN — SODIUM CHLORIDE 2.5 MILLION UNITS: 900 INJECTION, SOLUTION INTRAVENOUS at 19:50

## 2022-11-24 RX ADMIN — SODIUM CHLORIDE 5 MILLION UNITS: 0.9 INJECTION, SOLUTION INTRAVENOUS at 07:51

## 2022-11-24 RX ADMIN — BETAMETHASONE SODIUM PHOSPHATE AND BETAMETHASONE ACETATE 12 MG: 3; 3 INJECTION, SUSPENSION INTRA-ARTICULAR; INTRALESIONAL; INTRAMUSCULAR at 08:06

## 2022-11-24 RX ADMIN — SODIUM CHLORIDE 2.5 MILLION UNITS: 900 INJECTION, SOLUTION INTRAVENOUS at 23:45
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
## 2022-11-24 NOTE — ASSESSMENT & PLAN NOTE
Recovering well   Encourage Ambulation  Encourage breastfeeding  GBS unknown but treated with penicillin  Rh +

## 2022-11-24 NOTE — OB LABOR/OXYTOCIN SAFETY PROGRESS
Oxytocin Safety Progress Check Note - Pooja Zapien 20 y o  female MRN: [id number]    Unit/Bed#: -01 Encounter: [id number]    Dose (yvonne-units/min) Oxytocin: 18 yvonne-units/min  Contraction Frequency (minutes): 3-4 with couplets  Contraction Quality: Mild  Tachysystole: No   Cervical Dilation: 4        Cervical Effacement: 80  Fetal Station: -2  Baseline Rate: 150 bpm  Fetal Heart Rate: 146 BPM  FHR Category: Category I               Vital Signs:   Vitals:    11/24/22 1725   BP: 126/64   Pulse: 101   Resp:    Temp: 99 4 °F (37 4 °C)   SpO2:        Notes/comments: SVE 4/80/-2, FHT Cat I, toco q2  IUPC placed           Robert Scott MD 11/24/2022 5:33 PM

## 2022-11-24 NOTE — OB LABOR/OXYTOCIN SAFETY PROGRESS
Labor Progress Note - Dixie Dodge 20 y o  female MRN: [id number]    Unit/Bed#: -01 Encounter: [id number]       Contraction Frequency (minutes): 1-4  Contraction Quality: Mild  Tachysystole: No   Cervical Dilation: Closed        Cervical Effacement: 80  Fetal Station: -2  Baseline Rate: 155 bpm  Fetal Heart Rate: 165 BPM  FHR Category: Category I               Vital Signs:   Vitals:    11/24/22 0945   BP:    Pulse:    Resp:    Temp: 98 9 °F (37 2 °C)       Notes/comments: SVE unchanged, FHT Cat I, toco irregular  Starting pitocin, signed consent form  Discussed with attending          Migdalia Claros MD 11/24/2022 10:18 AM

## 2022-11-24 NOTE — OB LABOR/OXYTOCIN SAFETY PROGRESS
Oxytocin Safety Progress Check Note - Eneida Little 20 y o  female MRN: [id number]    Unit/Bed#: -01 Encounter: [id number]    Dose (yvonne-units/min) Oxytocin: 8 yvonne-units/min (rate increased to 8 with pt verbal consent )  Contraction Frequency (minutes): 1-4  Contraction Quality: Mild  Tachysystole: No   Cervical Dilation: 4        Cervical Effacement: 80  Fetal Station: -2  Baseline Rate: 140 bpm  Fetal Heart Rate: 156 BPM  FHR Category: Category I               Vital Signs:   Vitals:    11/24/22 0947   BP: 131/71   Pulse: (!) 112   Resp:    Temp:        Notes/comments:   SVE deferred at this time  FHT reactive and category I  Continue pitocin titration, adjust toco to better monitor contractions        Polo Finn MD 11/24/2022 12:56 PM

## 2022-11-24 NOTE — TELEPHONE ENCOUNTER
Regardin Weeks pregnant experiencing lost of fluid( Patient stated that her phone dosen't take block call)  ----- Message from [employer] sent at 2022 10:13 PM EST -----  '' I'm 35 weeks pregnant I'm experiencing lost of fluid

## 2022-11-24 NOTE — TELEPHONE ENCOUNTER
TC on call provider patient's symptoms and stated it is ok for patient to go to [facility]  Patient will be headed in to be evaluated

## 2022-11-24 NOTE — TELEPHONE ENCOUNTER
Reason for Disposition  • Leakage of fluid from vagina    Answer Assessment - Initial Assessment Questions  1  ONSET: "When did the symptoms begin?"         About 30 minutes ago  2  CONTRACTIONS: "Are you having any contractions?" If yes, ask: "Describe the contractions that you are having " (e g , duration, frequency, regularity, severity)      Denies, denies any abdominal pain  3  RULA: "What date are you expecting to deliver?"      35w3d RULA 12/25  4  PARITY: "Have you had a baby before?" If Yes, ask: "How long did the labor last?"      First baby  5  FETAL MOVEMENT: "Has the baby's movement decreased or changed significantly from normal?"      Baby moving well  6  OTHER SYMPTOMS: "Do you have any other symptoms?" (e g , abdominal pain, vaginal bleeding, fever, hand/facial swelling)     Denies     Clear fluid with white; enough to get carpet wet and was running down leg  Happened about 30 minutes ago  [facility] for delivery    Patient is [place], [place]; about an hour and 45 minutes away   Is near [facility]     Protocols used: PREGNANCY - RUPTURE OF [facility] [place] [facility]

## 2022-11-25 ENCOUNTER — ANESTHESIA EVENT (OUTPATIENT)
Dept: ANESTHESIOLOGY | Facility: HOSPITAL | Age: 22
End: 2022-11-25

## 2022-11-25 ENCOUNTER — ANESTHESIA (OUTPATIENT)
Dept: ANESTHESIOLOGY | Facility: HOSPITAL | Age: 22
End: 2022-11-25

## 2022-11-25 LAB
BASE EXCESS BLDCOA CALC-SCNC: -2.8 MMOL/L (ref 3–11)
BASE EXCESS BLDCOV CALC-SCNC: -2.3 MMOL/L (ref 1–9)
HCO3 BLDCOA-SCNC: 26.1 MMOL/L (ref 17.3–27.3)
HCO3 BLDCOV-SCNC: 24.5 MMOL/L (ref 12.2–28.6)
OXYHGB MFR BLDCOA: 13 %
OXYHGB MFR BLDCOV: 48.2 %
PCO2 BLDCOA: 63.7 MM[HG] (ref 30–60)
PCO2 BLDCOV: 50 MM HG (ref 27–43)
PH BLDCOA: 7.23 [PH] (ref 7.23–7.43)
PH BLDCOV: 7.31 [PH] (ref 7.19–7.49)
PO2 BLDCOA: <10 MM HG (ref 5–25)
PO2 BLDCOV: 21.5 MM HG (ref 15–45)
SAO2 % BLDCOV: 9.3 ML/DL

## 2022-11-25 RX ORDER — OXYCODONE HYDROCHLORIDE 5 MG/1
5 TABLET ORAL
Status: DISCONTINUED | OUTPATIENT
Start: 2022-11-25 | End: 2022-11-27 | Stop reason: HOSPADM

## 2022-11-25 RX ORDER — DIAPER,BRIEF,INFANT-TODD,DISP
1 EACH MISCELLANEOUS DAILY PRN
Status: DISCONTINUED | OUTPATIENT
Start: 2022-11-25 | End: 2022-11-27 | Stop reason: HOSPADM

## 2022-11-25 RX ORDER — OXYTOCIN/RINGER'S LACTATE 30/500 ML
250 PLASTIC BAG, INJECTION (ML) INTRAVENOUS ONCE
Status: DISCONTINUED | OUTPATIENT
Start: 2022-11-25 | End: 2022-11-27 | Stop reason: HOSPADM

## 2022-11-25 RX ORDER — GUAIFENESIN 600 MG/1
600 TABLET, EXTENDED RELEASE ORAL EVERY 12 HOURS SCHEDULED
Status: DISCONTINUED | OUTPATIENT
Start: 2022-11-25 | End: 2022-11-27 | Stop reason: HOSPADM

## 2022-11-25 RX ORDER — ONDANSETRON 2 MG/ML
4 INJECTION INTRAMUSCULAR; INTRAVENOUS EVERY 8 HOURS PRN
Status: DISCONTINUED | OUTPATIENT
Start: 2022-11-25 | End: 2022-11-27 | Stop reason: HOSPADM

## 2022-11-25 RX ORDER — DOCUSATE SODIUM 100 MG/1
100 CAPSULE, LIQUID FILLED ORAL 2 TIMES DAILY
Status: DISCONTINUED | OUTPATIENT
Start: 2022-11-25 | End: 2022-11-27 | Stop reason: HOSPADM

## 2022-11-25 RX ORDER — IBUPROFEN 600 MG/1
600 TABLET ORAL EVERY 6 HOURS PRN
Status: DISCONTINUED | OUTPATIENT
Start: 2022-11-25 | End: 2022-11-27 | Stop reason: HOSPADM

## 2022-11-25 RX ORDER — CALCIUM CARBONATE 200(500)MG
1000 TABLET,CHEWABLE ORAL DAILY PRN
Status: DISCONTINUED | OUTPATIENT
Start: 2022-11-25 | End: 2022-11-27 | Stop reason: HOSPADM

## 2022-11-25 RX ORDER — ACETAMINOPHEN 325 MG/1
650 TABLET ORAL EVERY 4 HOURS PRN
Status: DISCONTINUED | OUTPATIENT
Start: 2022-11-25 | End: 2022-11-27 | Stop reason: HOSPADM

## 2022-11-25 RX ORDER — DIPHENHYDRAMINE HCL 25 MG
25 TABLET ORAL EVERY 6 HOURS PRN
Status: DISCONTINUED | OUTPATIENT
Start: 2022-11-25 | End: 2022-11-27 | Stop reason: HOSPADM

## 2022-11-25 RX ADMIN — IBUPROFEN 600 MG: 600 TABLET, FILM COATED ORAL at 09:46

## 2022-11-25 RX ADMIN — ACETAMINOPHEN 650 MG: 325 TABLET, FILM COATED ORAL at 02:20

## 2022-11-25 RX ADMIN — IBUPROFEN 600 MG: 600 TABLET, FILM COATED ORAL at 16:02

## 2022-11-25 RX ADMIN — GUAIFENESIN 600 MG: 600 TABLET, EXTENDED RELEASE ORAL at 20:42

## 2022-11-25 RX ADMIN — IBUPROFEN 600 MG: 600 TABLET, FILM COATED ORAL at 22:00

## 2022-11-25 RX ADMIN — DOCUSATE SODIUM 100 MG: 100 CAPSULE, LIQUID FILLED ORAL at 09:46

## 2022-11-25 RX ADMIN — Medication 1 APPLICATION: at 02:20

## 2022-11-25 RX ADMIN — DOCUSATE SODIUM 100 MG: 100 CAPSULE, LIQUID FILLED ORAL at 17:42

## 2022-11-25 RX ADMIN — IBUPROFEN 600 MG: 600 TABLET, FILM COATED ORAL at 02:20

## 2022-11-25 NOTE — OB LABOR/OXYTOCIN SAFETY PROGRESS
Oxytocin Safety Progress Check Note - Chen Hahn 20 y o  female MRN: [id number]    Unit/Bed#: -01 Encounter: [id number]    Dose (yvonne-units/min) Oxytocin: 24 yvonne-units/min  Contraction Frequency (minutes): 2  Contraction Quality: Moderate  Tachysystole: No   Cervical Dilation: 5        Cervical Effacement: 80  Fetal Station: -2  Baseline Rate: 145 bpm  Fetal Heart Rate: 148 BPM  FHR Category: Category I               Vital Signs:   Vitals:    11/24/22 2147   BP: 118/68   Pulse: 96   Resp:    Temp:    SpO2:        Patient having moderately strong contractions, SVE as above  FHT category I  Continue pitocin titration      Danuta Mccarthy MD 11/24/2022 10:19 PM

## 2022-11-25 NOTE — OB LABOR/OXYTOCIN SAFETY PROGRESS
Oxytocin Safety Progress Check Note - Maurizio Bourne 20 y o  female MRN: [id number]    Unit/Bed#: -01 Encounter: [id number]    Dose (yvonne-units/min) Oxytocin: 22 yvonne-units/min  Contraction Frequency (minutes): 1-2  Contraction Quality: Moderate  Tachysystole: Yes   Cervical Dilation: 5        Cervical Effacement: 80  Fetal Station: -2  Baseline Rate: 145 bpm  Fetal Heart Rate: 145 BPM  FHR Category: Category II               Vital Signs:   Vitals:    11/24/22 2348   BP: 127/77   Pulse: 97   Resp:    Temp:    SpO2:        Notes/comments:   SVE deferred at this time  FHT with periods of minimal variability after stadol administration  Continue pitocin titration      Kiesha Smith MD 11/25/2022 12:05 AM

## 2022-11-25 NOTE — OB LABOR/OXYTOCIN SAFETY PROGRESS
Oxytocin Safety Progress Check Note - Sancho Pillow 20 y o  female MRN: [id number]    Unit/Bed#: -01 Encounter: [id number]    Dose (yvonne-units/min) Oxytocin: 20 yvonne-units/min  Contraction Frequency (minutes): 1 5-4  Contraction Quality: Mild  Tachysystole: No   Cervical Dilation: 4        Cervical Effacement: 80  Fetal Station: -2  Baseline Rate: 140 bpm  Fetal Heart Rate: 140 BPM  FHR Category: Category I               Vital Signs:   Vitals:    11/24/22 1948   BP: 133/65   Pulse: 91   Resp:    Temp:    SpO2:        Notes/comments: SVE deferred, FHT Cat I, toco with adequate MVUs  Discussed with attending          Benita Henderson MD 11/24/2022 7:56 PM

## 2022-11-25 NOTE — DISCHARGE SUMMARY
Discharge Summary - OB/GYN   Lang Andrade 20 y o  female MRN: [id number]  Unit/Bed#: -01 Encounter: [id number]      Admission Date: 2022     Discharge Date: 2022    Admitting Diagnosis:   1  Pregnancy at 35w5d  2  Sickle cell trait  3  Left lateral subserosal fibroid    Discharge Diagnosis:   Same, delivered      Procedures: spontaneous vaginal delivery    Delivering Attending: Francis Ventura MD  Discharge Attending: [facility] Course:     Lang Andrade is a 20 y o   at 35w5d wks who was initially admitted for PPROM  The patient received betamethasone and was allowed to labor by expectant management; however she did not make change from 4/80/-2  She was started on pitocin, and was unchanged 6 hours later  IUPC was placed, and on next check patient made change to 5/80/-2  The patient was then complete at next check at [address] [place] [address]  She delivered a viable male  on 22 at 0155  Weight 5lbs 8oz via spontaneous vaginal delivery  Apgars were 8 (1 min) and 9 (5 min)   was transferred to  nursery  Patient tolerated the procedure well and was transferred to recovery in stable condition  Her post-partum course was uncomplicated  Her post-partum pain was well controlled with oral analgesics  On day of discharge, she was ambulating and able to reasonably perform all ADLs  She was voiding and had appropriate bowel function  Pain was well controlled  She was discharged home on post-partum day #1 without complications  Patient was instructed to follow up with her OB as an outpatient and was given appropriate warnings to call provider if she develops signs of infection or uncontrolled pain  Complications: none apparent    Condition at discharge: good     Discharge instructions/Information to patient and family:   See after visit summary for information provided to patient and family        Provisions for Follow-Up Care:  See after visit summary for information related to follow-up care and any pertinent home health orders  Disposition: Home    Planned Readmission: No    Discharge Medications: For a complete list of the patient's medications, please refer to her med rec        Lauren Landry MD

## 2022-11-25 NOTE — PLAN OF CARE
Problem: PAIN - ADULT  Goal: Verbalizes/displays adequate comfort level or baseline comfort level  Description: Interventions:  - Encourage patient to monitor pain and request assistance  - Assess pain using appropriate pain scale  - Administer analgesics based on type and severity of pain and evaluate response  - Implement non-pharmacological measures as appropriate and evaluate response  - Consider cultural and social influences on pain and pain management  - Notify physician/advanced practitioner if interventions unsuccessful or patient reports new pain  Outcome: Progressing     Problem: INFECTION - ADULT  Goal: Absence or prevention of progression during hospitalization  Description: INTERVENTIONS:  - Assess and monitor for signs and symptoms of infection  - Monitor lab/diagnostic results  - Monitor all insertion sites, i e  indwelling lines, tubes, and drains  - Monitor endotracheal if appropriate and nasal secretions for changes in amount and color  - [place] appropriate cooling/warming therapies per order  - Administer medications as ordered  - Instruct and encourage patient and family to use good hand hygiene technique  - Identify and instruct in appropriate isolation precautions for identified infection/condition  Outcome: Progressing  Goal: Absence of fever/infection during neutropenic period  Description: INTERVENTIONS:  - Monitor WBC    Outcome: Progressing     Problem: SAFETY ADULT  Goal: Patient will remain free of falls  Description: INTERVENTIONS:  - Educate patient/family on patient safety including physical limitations  - Instruct patient to call for assistance with activity   - Consult OT/PT to assist with strengthening/mobility   - Keep Call bell within reach  - Keep bed low and locked with side rails adjusted as appropriate  - Keep care items and personal belongings within reach  - Initiate and maintain comfort rounds  - Make Fall Risk Sign visible to staff  - Offer Toileting every  Hours, in advance of need  - Initiate/Maintain alarm  - Obtain necessary fall risk management equipment:   - Apply yellow socks and bracelet for high fall risk patients  - Consider moving patient to room near nurses station  Outcome: Progressing  Goal: Maintain or return to baseline ADL function  Description: INTERVENTIONS:  -  Assess patient's ability to carry out ADLs; assess patient's baseline for ADL function and identify physical deficits which impact ability to perform ADLs (bathing, care of mouth/teeth, toileting, grooming, dressing, etc )  - Assess/evaluate cause of self-care deficits   - Assess range of motion  - Assess patient's mobility; develop plan if impaired  - Assess patient's need for assistive devices and provide as appropriate  - Encourage maximum independence but intervene and supervise when necessary  - Involve family in performance of ADLs  - Assess for home care needs following discharge   - Consider OT consult to assist with ADL evaluation and planning for discharge  - Provide patient education as appropriate  Outcome: Progressing  Goal: Maintains/Returns to pre admission functional level  Description: INTERVENTIONS:  - Perform BMAT or MOVE assessment daily    - Set and communicate daily mobility goal to care team and patient/family/caregiver  - Collaborate with rehabilitation services on mobility goals if consulted  - Perform Range of Motion  times a day  - Reposition patient every  hours    - Dangle patient  times a day  - Stand patient  times a day  - Ambulate patient  times a day  - Out of bed to chair  times a day   - Out of bed for meals  times a day  - Out of bed for toileting  - Record patient progress and toleration of activity level   Outcome: Progressing     Problem: Knowledge Deficit  Goal: Patient/family/caregiver demonstrates understanding of disease process, treatment plan, medications, and discharge instructions  Description: Complete learning assessment and assess knowledge base   Interventions:  - Provide teaching at level of understanding  - Provide teaching via preferred learning methods  Outcome: Progressing  Goal: Verbalizes understanding of labor plan  Description: Assess patient/family/caregiver's baseline knowledge level and ability to understand information  Provide education via patient/family/caregiver's preferred learning method at appropriate level of understanding  1  Provide teaching at level of understanding  2  Provide teaching via preferred learning method(s)    Outcome: Progressing     Problem: DISCHARGE PLANNING  Goal: Discharge to home or other facility with appropriate resources  Description: INTERVENTIONS:  - Identify barriers to discharge w/patient and caregiver  - Arrange for needed discharge resources and transportation as appropriate  - Identify discharge learning needs (meds, wound care, etc )  - Arrange for interpretive services to assist at discharge as needed  - Refer to Case Management Department for coordinating discharge planning if the patient needs post-hospital services based on physician/advanced practitioner order or complex needs related to functional status, cognitive ability, or social support system  Outcome: Progressing     Problem: ALTERATION IN THE BREAST  Goal: Optimize infant feeding at the breast  Description: INTERVENTIONS:  - Latch, breast and nipple assessment  - Assess prior breast feeding history  - Hand expression of breast milk  - For cracked, bleeding and or sore nipples reassess latch, treat damaged nipple  -Educate mother on feeding cues  -Positioning/latch techniques  Outcome: Progressing     Problem: INADEQUATE LATCH, SUCK OR SWALLOW  Goal: Demonstrate ability to latch and sustain latch, audible swallowing and satiety  Description: INTERVENTIONS:  - Assess oral anatomy, notify Physician/AP for abnormal findings  - Establish milk expression  - Maximize feeding opportunity (skin to skin, behavioral state)  - Position/latch techniques  - Discourage use of pacifier-artificial nipple  - Mechanical pumping  - Nipple Shield  - Supplemental formula feeding (Physician/AP order)  - Alternative feeding method  Outcome: Progressing     Problem: Labor & Delivery  Goal: Manages discomfort  Description: Assess and monitor for signs and symptoms of discomfort  Assess patient's pain level regularly and per hospital policy  Administer medications as ordered  Support use of nonpharmacological methods to help control pain such as distraction, imagery, relaxation, and application of heat and cold  Collaborate with interdisciplinary team and patient to determine appropriate pain management plan  1  Include patient in decisions related to comfort  2  Offer non-pharmacological pain management interventions  3  Report ineffective pain management to physician  Outcome: Progressing  Goal: Patient vital signs are stable  Description: 1  Assess vital signs - vaginal delivery    Outcome: Progressing

## 2022-11-25 NOTE — L&D DELIVERY NOTE
DELIVERY NOTE  Doug Bullock 20 y o  female MRN: [id number]  Unit/Bed#:  204-01 Encounter: [id number]    Obstetrician:    Dr Low Paniagua MD    Assistant:   Dr Jorge Newell MD    Pre-Delivery Diagnosis:   Patient Active Problem List   Diagnosis   •  (spontaneous vaginal delivery)   • Sickle cell trait in mother affecting pregnancy [facility])   • Depression   • Uterine fibroid in pregnancy   • Noncompliant pregnant patient in third trimester       Post-Delivery Diagnosis:   Same as above - Delivered  1st degree laceration  Right labial laceration    Procedure:  Spontaneous vaginal delivery    Anesthesia:  None    Specimens:   Cord blood obtained   Placenta; small and with accessory lobe, central insertion, intact   Arterial and venous blood gases (below)     Gases:  Umbilical Cord Venous Blood Gas:  Results from last 7 days   Lab Units 22  0208   PH COV  7 308   PCO2 COV mm HG 50 0*   HCO3 COV mmol/L 24 5   BASE EXC COV mmol/L -2 3*   O2 CT CD VB mL/dL 9 3   O2 HGB, VENOUS CORD % 28 8     Umbilical Cord Arterial Blood Gas:  Results from last 7 days   Lab Units 22  0208   PH COA  7 231   PCO2 COA  63 7*   PO2 COA mm HG <10 0   HCO3 COA mmol/L 26 1   BASE EXC COA mmol/L -2 8*   O2 HGB, ARTERIAL CORD % 13 0       Quantitative Blood Loss:   200 mL           Complications:    None    Brief Description of Labor Course:  Doug Bullock is a 20 y o   at 35w5d wks who was initially admitted for PPROM  The patient received betamethasone and was allowed to labor by expectant management; however she did not make change from 4/80/-2  She was started on pitocin, and was unchanged 6 hours later  IUPC was placed, and on next check patient made change to 5/80/-2  The patient was then complete at next check at [facility]  She pushed for 9 min, and delivered a healthy  at 80  Description of Delivery:   With  the assistance of maternal expulsive forces, the fetal vertex delivered spontaneously   The patient was on hands and knees while pushing  The anterior right shoulder was delivered atraumatically with gentle upward traction  The contralateral arm was delivered with gentle downward traction and maternal expulsive forces  The remainder of the fetus delivered spontaneously at 80, resulting in a viable male   Upon delivery, the infant was placed on the mothers abdomen and the cord was doubly clamped and cut after 30 seconds  The  was noted to have good tone and cry spontaneously  There was no evidence of injury  The  was passed off to  staff for evaluation  Umbilical cord blood and umbilical artery and venous gases were collected and sent to the lab  An intact placenta was delivered spontaneously at 0202 using fundal massage and gentle cord traction and was noted to have a centrally-inserted 3-vessel cord  Active management of the third stage of labor was undertaken with IV pitocin at 250milliunits/min  Inspection of the perineum, vagina, labia, cervix, and urethra revealed a right labial laceration that was hemostatic  Bleeding was noted to be under control   Outcome:  Living  with APGARS 8 (1 min) and 9 (5 min)   weight: pending    At the conclusion of the delivery, all needle, sponge, and instrument counts were noted to be correct  Patient tolerated the procedure well and was allowed to recover in labor and delivery room with family and  before being transferred to the post-partum floor  Conclusion:  Mother and baby are currently recovering nicely in stable condition  Attending Supervision:   Dr Suhas Mckeon MD was present for the entire procedure      Mandy Zaldivar MD   OB/GYN PGY-1  2022 2:20 AM

## 2022-11-25 NOTE — OB LABOR/OXYTOCIN SAFETY PROGRESS
Oxytocin Safety Progress Check Note - Dixie Dodge 20 y o  female MRN: [id number]    Unit/Bed#: -01 Encounter: [id number]    Dose (yvonne-units/min) Oxytocin: 22 yvonne-units/min  Contraction Frequency (minutes): 2-2 5  Contraction Quality: Moderate  Tachysystole: No   Cervical Dilation: 5        Cervical Effacement: 90  Fetal Station: 0  Baseline Rate: 155 bpm  Fetal Heart Rate: 158 BPM  FHR Category: Category II               Vital Signs:   Vitals:    11/25/22 0047   BP: 119/71   Pulse: 100   Resp:    Temp:    SpO2:        Notes/comments: SVE 5/90/0, FHT Cat I, toco q2  Discussed with attending          Migdalia Claros MD 11/25/2022 1:11 AM

## 2022-11-25 NOTE — PLAN OF CARE
Problem: PAIN - ADULT  Goal: Verbalizes/displays adequate comfort level or baseline comfort level  Description: Interventions:  - Encourage patient to monitor pain and request assistance  - Assess pain using appropriate pain scale  - Administer analgesics based on type and severity of pain and evaluate response  - Implement non-pharmacological measures as appropriate and evaluate response  - Consider cultural and social influences on pain and pain management  - Notify physician/advanced practitioner if interventions unsuccessful or patient reports new pain  11/25/2022 1649 by Simi Reyes RN  Outcome: Progressing  11/25/2022 1648 by Simi Reyes RN  Outcome: Progressing     Problem: INFECTION - ADULT  Goal: Absence or prevention of progression during hospitalization  Description: INTERVENTIONS:  - Assess and monitor for signs and symptoms of infection  - Monitor lab/diagnostic results  - Monitor all insertion sites, i e  indwelling lines, tubes, and drains  - Monitor endotracheal if appropriate and nasal secretions for changes in amount and color  - [place] appropriate cooling/warming therapies per order  - Administer medications as ordered  - Instruct and encourage patient and family to use good hand hygiene technique  - Identify and instruct in appropriate isolation precautions for identified infection/condition  11/25/2022 1649 by Simi Reyes RN  Outcome: Progressing  11/25/2022 1648 by Simi Reyes RN  Outcome: Progressing  Goal: Absence of fever/infection during neutropenic period  Description: INTERVENTIONS:  - Monitor WBC    11/25/2022 1649 by Simi Reyes RN  Outcome: Progressing  11/25/2022 1648 by Simi Reyes RN  Outcome: Progressing     Problem: SAFETY ADULT  Goal: Patient will remain free of falls  Description: INTERVENTIONS:  - Educate patient/family on patient safety including physical limitations  - Instruct patient to call for assistance with activity   - Consult OT/PT to assist with strengthening/mobility   - Keep Call bell within reach  - Keep bed low and locked with side rails adjusted as appropriate  - Keep care items and personal belongings within reach  - Initiate and maintain comfort rounds  11/25/2022 1649 by Romina Fermin RN  Outcome: Progressing  11/25/2022 1648 by Romina Fermin RN  Outcome: Progressing  Goal: Maintain or return to baseline ADL function  Description: INTERVENTIONS:  -  Assess patient's ability to carry out ADLs; assess patient's baseline for ADL function and identify physical deficits which impact ability to perform ADLs (bathing, care of mouth/teeth, toileting, grooming, dressing, etc )  - Assess/evaluate cause of self-care deficits   - Assess range of motion  - Assess patient's mobility; develop plan if impaired  - Assess patient's need for assistive devices and provide as appropriate  - Encourage maximum independence but intervene and supervise when necessary  - Involve family in performance of ADLs  - Assess for home care needs following discharge   - Consider OT consult to assist with ADL evaluation and planning for discharge  - Provide patient education as appropriate  11/25/2022 1649 by Romina Fermin RN  Outcome: Progressing  11/25/2022 1648 by Romina Fermin RN  Outcome: Progressing  Goal: Maintains/Returns to pre admission functional level  Description: INTERVENTIONS:  - Perform BMAT or MOVE assessment daily    - Set and communicate daily mobility goal to care team and patient/family/caregiver     - Collaborate with rehabilitation services on mobility goals if consulted  - Out of bed for toileting  - Record patient progress and toleration of activity level   11/25/2022 1649 by Romina Fermin RN  Outcome: Progressing  11/25/2022 1648 by Romina Fermin RN  Outcome: Progressing     Problem: DISCHARGE PLANNING  Goal: Discharge to home or other facility with appropriate resources  Description: INTERVENTIONS:  - Identify barriers to discharge w/patient and caregiver  - Arrange for needed discharge resources and transportation as appropriate  - Identify discharge learning needs (meds, wound care, etc )  - Arrange for interpretive services to assist at discharge as needed  - Refer to Case Management Department for coordinating discharge planning if the patient needs post-hospital services based on physician/advanced practitioner order or complex needs related to functional status, cognitive ability, or social support system  11/25/2022 1649 by Skylar Norwood RN  Outcome: Progressing  11/25/2022 1648 by Skylar Norwood RN  Outcome: Progressing     Problem: ALTERATION IN THE BREAST  Goal: Optimize infant feeding at the breast  Description: INTERVENTIONS:  - Latch, breast and nipple assessment  - Assess prior breast feeding history  - Hand expression of breast milk  - For cracked, bleeding and or sore nipples reassess latch, treat damaged nipple  -Educate mother on feeding cues  -Positioning/latch techniques  11/25/2022 1649 by Skylar Norwood RN  Outcome: Progressing  11/25/2022 1648 by Skylar Norwood RN  Outcome: Progressing     Problem: INADEQUATE LATCH, SUCK OR SWALLOW  Goal: Demonstrate ability to latch and sustain latch, audible swallowing and satiety  Description: INTERVENTIONS:  - Assess oral anatomy, notify Physician/AP for abnormal findings  - Establish milk expression  - Maximize feeding opportunity (skin to skin, behavioral state)  - Position/latch techniques  - Discourage use of pacifier-artificial nipple  - Mechanical pumping  - Nipple Shield  - Supplemental formula feeding (Physician/AP order)  - Alternative feeding method  11/25/2022 1649 by Skylar Norwood RN  Outcome: Progressing  11/25/2022 1648 by Skylar Norwood RN  Outcome: Progressing     Problem: POSTPARTUM  Goal: Experiences normal postpartum course  Description: INTERVENTIONS:  - Monitor maternal vital signs  - Assess uterine involution and lochia  Outcome: Progressing  Goal: Appropriate maternal -  bonding  Description: INTERVENTIONS:  - Identify family support  - Assess for appropriate maternal/infant bonding   -Encourage maternal/infant bonding opportunities  - Referral to  or  as needed  Outcome: Progressing  Goal: Establishment of infant feeding pattern  Description: INTERVENTIONS:  - Assess breast/bottle feeding  - Refer to lactation as needed  Outcome: Progressing

## 2022-11-26 PROBLEM — U07.1 COVID-19: Status: ACTIVE | Noted: 2022-11-26

## 2022-11-26 LAB
FLUAV RNA RESP QL NAA+PROBE: NEGATIVE
FLUBV RNA RESP QL NAA+PROBE: NEGATIVE
GP B STREP DNA SPEC QL NAA+PROBE: NEGATIVE
RSV RNA RESP QL NAA+PROBE: NEGATIVE
SARS-COV-2 RNA RESP QL NAA+PROBE: POSITIVE

## 2022-11-26 RX ORDER — IBUPROFEN 600 MG/1
600 TABLET ORAL EVERY 6 HOURS PRN
Qty: 30 TABLET | Refills: 0
Start: 2022-11-26

## 2022-11-26 RX ORDER — DOCUSATE SODIUM 100 MG/1
100 CAPSULE, LIQUID FILLED ORAL 2 TIMES DAILY
Refills: 0
Start: 2022-11-26

## 2022-11-26 RX ORDER — CALCIUM CARBONATE 200(500)MG
1000 TABLET,CHEWABLE ORAL DAILY PRN
Refills: 0
Start: 2022-11-26

## 2022-11-26 RX ORDER — DIAPER,BRIEF,INFANT-TODD,DISP
1 EACH MISCELLANEOUS DAILY PRN
Qty: 30 G | Refills: 0
Start: 2022-11-26

## 2022-11-26 RX ORDER — ACETAMINOPHEN 325 MG/1
650 TABLET ORAL EVERY 4 HOURS PRN
Qty: 30 TABLET | Refills: 0 | Status: SHIPPED | OUTPATIENT
Start: 2022-11-26

## 2022-11-26 RX ADMIN — GUAIFENESIN 600 MG: 600 TABLET, EXTENDED RELEASE ORAL at 21:49

## 2022-11-26 RX ADMIN — DOCUSATE SODIUM 100 MG: 100 CAPSULE, LIQUID FILLED ORAL at 09:18

## 2022-11-26 RX ADMIN — DOCUSATE SODIUM 100 MG: 100 CAPSULE, LIQUID FILLED ORAL at 19:31

## 2022-11-26 RX ADMIN — IBUPROFEN 600 MG: 600 TABLET, FILM COATED ORAL at 19:31

## 2022-11-26 RX ADMIN — ACETAMINOPHEN 650 MG: 325 TABLET, FILM COATED ORAL at 09:20

## 2022-11-26 RX ADMIN — GUAIFENESIN 600 MG: 600 TABLET, EXTENDED RELEASE ORAL at 09:18

## 2022-11-26 NOTE — PROGRESS NOTES
Progress Note - OB/GYN  Susie Muro 20 y o  female MRN: [id number]  Unit/Bed#: -01 Encounter: [id number]    Assessment and Plan     Susie Muro is a patient of: [facility]  She is PPD# 1 s/p  spontaneous vaginal delivery  Recovering well and is stable       *  (spontaneous vaginal delivery)  Assessment & Plan  Recovering well   Encourage Ambulation  Encourage breastfeeding  GBS unknown but treated with penicillin  Rh +           Disposition    - Anticipate discharge home on PPD# 1      Subjective/Objective     Chief Complaint: Postpartum State     Subjective:    Susie Muro is PPD#1 s/p  spontaneous vaginal delivery  She has no current complaints  Pain is well controlled  Patient is currently voiding  She is ambulating  Patient is currently passing flatus and has had no bowel movement  She is tolerating PO, and denies nausea or vomitting  Patient denies fever, chills, chest pain, shortness of breath, or calf tenderness  Lochia is normal  She is  Breastfeeding  She is recovering well and is stable  She is interested in discharge today if she and baby are both cleared        Vitals:   /70 (BP Location: Left arm)   Pulse 86   Temp 99 1 °F (37 3 °C) (Oral)   Resp 18   Ht 5' 5" (1 651 m)   Wt 78 6 kg (173 lb 3 2 oz)   LMP 2022   SpO2 98%   Breastfeeding Yes   BMI 28 82 kg/m²       Intake/Output Summary (Last 24 hours) at 2022 9109  Last data filed at 2022 1301  Gross per 24 hour   Intake --   Output 600 ml   Net -600 ml       Invasive Devices     None                 Physical Exam:   GEN: Susie Muro appears well, alert and oriented x 3, pleasant and cooperative   CARDIO: RRR, no murmurs or rubs  RESP:  CTAB, no wheezes or rales  ABDOMEN: soft, no tenderness, no distention, fundus @ u-3  EXTREMITIES:  non tender, no erythema  Labs:     Hemoglobin   Date Value Ref Range Status   2022 10 2 (L) 11 5 - 15 4 g/dL Final   2022 10 2 (L) 11 5 - 15 4 g/dL Final     WBC   Date Value Ref Range Status   11/24/2022 13 41 (H) 4 31 - 10 16 Thousand/uL Final   11/14/2022 12 65 (H) 4 31 - 10 16 Thousand/uL Final     Platelets   Date Value Ref Range Status   11/24/2022 264 149 - 390 Thousands/uL Final   11/14/2022 316 149 - 390 Thousands/uL Final          Jaida Torres MD  11/26/2022  8:22 AM

## 2022-11-26 NOTE — PLAN OF CARE
Problem: PAIN - ADULT  Goal: Verbalizes/displays adequate comfort level or baseline comfort level  Description: Interventions:  - Encourage patient to monitor pain and request assistance  - Assess pain using appropriate pain scale  - Administer analgesics based on type and severity of pain and evaluate response  - Implement non-pharmacological measures as appropriate and evaluate response  - Consider cultural and social influences on pain and pain management  - Notify physician/advanced practitioner if interventions unsuccessful or patient reports new pain  Outcome: Progressing     Problem: INFECTION - ADULT  Goal: Absence or prevention of progression during hospitalization  Description: INTERVENTIONS:  - Assess and monitor for signs and symptoms of infection  - Monitor lab/diagnostic results  - Monitor all insertion sites, i e  indwelling lines, tubes, and drains  - Monitor endotracheal if appropriate and nasal secretions for changes in amount and color  - [place] appropriate cooling/warming therapies per order  - Administer medications as ordered  - Instruct and encourage patient and family to use good hand hygiene technique  - Identify and instruct in appropriate isolation precautions for identified infection/condition  Outcome: Progressing  Goal: Absence of fever/infection during neutropenic period  Description: INTERVENTIONS:  - Monitor WBC    Outcome: Progressing     Problem: SAFETY ADULT  Goal: Patient will remain free of falls  Description: INTERVENTIONS:  - Educate patient/family on patient safety including physical limitations  - Instruct patient to call for assistance with activity   - Consult OT/PT to assist with strengthening/mobility   - Keep Call bell within reach  - Keep bed low and locked with side rails adjusted as appropriate  - Keep care items and personal belongings within reach  - Initiate and maintain comfort rounds  - Make Fall Risk Sign visible to staff  - Offer Toileting every Hours, in advance of need  - Initiate/Maintain alarm  - Obtain necessary fall risk management equipment:   - Apply yellow socks and bracelet for high fall risk patients  - Consider moving patient to room near nurses station  Outcome: Progressing  Goal: Maintain or return to baseline ADL function  Description: INTERVENTIONS:  -  Assess patient's ability to carry out ADLs; assess patient's baseline for ADL function and identify physical deficits which impact ability to perform ADLs (bathing, care of mouth/teeth, toileting, grooming, dressing, etc )  - Assess/evaluate cause of self-care deficits   - Assess range of motion  - Assess patient's mobility; develop plan if impaired  - Assess patient's need for assistive devices and provide as appropriate  - Encourage maximum independence but intervene and supervise when necessary  - Involve family in performance of ADLs  - Assess for home care needs following discharge   - Consider OT consult to assist with ADL evaluation and planning for discharge  - Provide patient education as appropriate  Outcome: Progressing  Goal: Maintains/Returns to pre admission functional level  Description: INTERVENTIONS:  - Perform BMAT or MOVE assessment daily    - Set and communicate daily mobility goal to care team and patient/family/caregiver  - Collaborate with rehabilitation services on mobility goals if consulted  - Perform Range of Motion times a day  - Reposition patient every hours    - Dangle patient times a day  - Stand patient times a day  - Ambulate patient times a day  - Out of bed to chair times a day   - Out of bed for meals times a day  - Out of bed for toileting  - Record patient progress and toleration of activity level   Outcome: Progressing     Problem: DISCHARGE PLANNING  Goal: Discharge to home or other facility with appropriate resources  Description: INTERVENTIONS:  - Identify barriers to discharge w/patient and caregiver  - Arrange for needed discharge resources and transportation as appropriate  - Identify discharge learning needs (meds, wound care, etc )  - Arrange for interpretive services to assist at discharge as needed  - Refer to Case Management Department for coordinating discharge planning if the patient needs post-hospital services based on physician/advanced practitioner order or complex needs related to functional status, cognitive ability, or social support system  Outcome: Progressing     Problem: ALTERATION IN THE BREAST  Goal: Optimize infant feeding at the breast  Description: INTERVENTIONS:  - Latch, breast and nipple assessment  - Assess prior breast feeding history  - Hand expression of breast milk  - For cracked, bleeding and or sore nipples reassess latch, treat damaged nipple  -Educate mother on feeding cues  -Positioning/latch techniques  Outcome: Progressing     Problem: INADEQUATE LATCH, SUCK OR SWALLOW  Goal: Demonstrate ability to latch and sustain latch, audible swallowing and satiety  Description: INTERVENTIONS:  - Assess oral anatomy, notify Physician/AP for abnormal findings  - Establish milk expression  - Maximize feeding opportunity (skin to skin, behavioral state)  - Position/latch techniques  - Discourage use of pacifier-artificial nipple  - Mechanical pumping  - Nipple Shield  - Supplemental formula feeding (Physician/AP order)  - Alternative feeding method  Outcome: Progressing     Problem: POSTPARTUM  Goal: Experiences normal postpartum course  Description: INTERVENTIONS:  - Monitor maternal vital signs  - Assess uterine involution and lochia  Outcome: Progressing  Goal: Appropriate maternal -  bonding  Description: INTERVENTIONS:  - Identify family support  - Assess for appropriate maternal/infant bonding   -Encourage maternal/infant bonding opportunities  - Referral to  or  as needed  Outcome: Progressing  Goal: Establishment of infant feeding pattern  Description: INTERVENTIONS:  - Assess breast/bottle feeding  - Refer to lactation as needed  Outcome: Progressing  Goal: Incision(s), wounds(s) or drain site(s) healing without S/S of infection  Description: INTERVENTIONS  - Assess and document dressing, incision, wound bed, drain sites and surrounding tissue  - Provide patient and family education  - Perform skin care/dressing changes every   Outcome: Progressing

## 2022-11-26 NOTE — CASE MANAGEMENT
Case Management Discharge Planning Note    Patient name Maurizio Bourne  Location /-16 MRN [id number]  : 2000 Date 2022       Current Admission Date: 2022  Current Admission Diagnosis: (spontaneous vaginal delivery)   Patient Active Problem List    Diagnosis Date Noted   • Noncompliant pregnant patient in third trimester 2022   • Uterine fibroid in pregnancy 2022   •  (spontaneous vaginal delivery) 2022   • Sickle cell trait in mother affecting pregnancy ([address] ) 2022   • Depression 2022      LOS (days): 2  Geometric Mean LOS (GMLOS) (days):   Days to GMLOS:     OBJECTIVE:  Risk of Unplanned Readmission Score: 3 99         Current admission status: Inpatient   Preferred Pharmacy:   [address] [facility], [address] [place]  Phone: [phone] Fax: [facility] [address], [address] [facility] [address] [place] [address]  Phone: [phone] Fax: [phone]    Primary Care Provider: No primary care provider on file  Primary Insurance: Nonda Dusky SHIELD  Secondary Insurance: [employer]ET[employer]    DISCHARGE DETAILS:  CM spoke with MOB at bedside  CM introduced self and role  MOB confirmed her permanent address is [address] [place] [address]  [address] [place] [address]  MOB staying with SO at discharge in [place]  MOB first child  MOB stated she has all baby supplies at home  MOB has transport at discharge  MOB is current with WIC  Baby Boy will be placed on MOB insurance  MOB aware to contact insurance company to add Baby boy to insurance  CM provided MOB pediatrician locations at bedside  MOB UDS negative  Baby boy UDS negative  MOB cleared to discharge home with Baby Boy  No other CM needs noted

## 2022-11-26 NOTE — LACTATION NOTE
This note was copied from a baby's chart  Went to see Diamond Conner who is breastfeeding her Baby Boy  She states that baby has been sleepy and is hard to latch at the breast today  She has been hand expressing colostrum  Information given and discussed on breastfeeding a late  infant  Discussed sleepiness, maintaining body temperature, the lack of stamina necessitating shorter feedings  Encouraged feeding every 2-3 hours around the clock followed by hand expressing  If baby continues to be sleepy, I recommended pumping in addition to hand expression  Positioning and Latch reviewed:   - Position baby up at chest level using pillows for support    - Bring baby to breast,not breast to baby ( no hunching over )   - Align nose to nipple and drag nipple down to chin to achieve a wide open mouth and a deep latch   - Baby's ear, shoulder, hip in alignment   - Baby's upper and lower lip should be flanged on the breast     Diamond Conner was able to latch baby on and achieve a deep latch with assistance  Encouraged her to call for additional assistance as needed

## 2022-11-27 VITALS
WEIGHT: 173.2 LBS | DIASTOLIC BLOOD PRESSURE: 69 MMHG | OXYGEN SATURATION: 98 % | HEIGHT: 65 IN | SYSTOLIC BLOOD PRESSURE: 107 MMHG | RESPIRATION RATE: 18 BRPM | BODY MASS INDEX: 28.86 KG/M2 | HEART RATE: 75 BPM | TEMPERATURE: 98.2 F

## 2022-11-27 RX ADMIN — DOCUSATE SODIUM 100 MG: 100 CAPSULE, LIQUID FILLED ORAL at 17:09

## 2022-11-27 RX ADMIN — ACETAMINOPHEN 650 MG: 325 TABLET, FILM COATED ORAL at 17:09

## 2022-11-27 RX ADMIN — GUAIFENESIN 600 MG: 600 TABLET, EXTENDED RELEASE ORAL at 10:09

## 2022-11-27 RX ADMIN — DOCUSATE SODIUM 100 MG: 100 CAPSULE, LIQUID FILLED ORAL at 10:09

## 2022-11-27 NOTE — PROGRESS NOTES
Progress Note - OB/GYN  Yolis  20 y o  female MRN: [id number]  Unit/Bed#: -01 Encounter: [id number]    Assessment and Plan     Yolis  is a patient of: [facility]  She is PPD# 2 s/p  spontaneous vaginal delivery  Recovering well and is stable       COVID-19  Assessment & Plan  Tested positive on 22  Symptoms began 22  Supportive care      *  (spontaneous vaginal delivery)  Assessment & Plan  Recovering well   Encourage Ambulation  Encourage breastfeeding  GBS unknown but treated with penicillin  Rh +           Disposition    - Anticipate discharge home on PPD# 2      Subjective/Objective     Chief Complaint: Postpartum State     Subjective:    Yolis  is PPD#2 s/p  spontaneous vaginal delivery  She has no current complaints  Pain is well controlled  Patient is currently voiding  She is ambulating  Patient is currently passing flatus and has had bowel movement  She is tolerating PO, and denies nausea or vomitting  Patient denies fever, chills, chest pain, shortness of breath, or calf tenderness  Lochia is normal  She is  Breastfeeding  She is recovering well and is stable  She is ready for discharge today        Vitals:   /70 (BP Location: Left arm)   Pulse 67   Temp 97 9 °F (36 6 °C) (Oral)   Resp 16   Ht 5' 5" (1 651 m)   Wt 78 6 kg (173 lb 3 2 oz)   LMP 2022   SpO2 98%   Breastfeeding Yes   BMI 28 82 kg/m²     No intake or output data in the 24 hours ending 22 0658    Invasive Devices     None                 Physical Exam:   GEN: Yolis Choi appears well, alert and oriented x 3, pleasant and cooperative   CARDIO: RRR, no murmurs or rubs  RESP:  CTAB, no wheezes or rales  ABDOMEN: soft, no tenderness, no distention, fundus @ U-2  EXTREMITIES:  non tender, no erythema  Labs:     Hemoglobin   Date Value Ref Range Status   2022 10 2 (L) 11 5 - 15 4 g/dL Final   2022 10 2 (L) 11 5 - 15 4 g/dL Final     WBC   Date Value Ref Range Status   11/24/2022 13 41 (H) 4 31 - 10 16 Thousand/uL Final   11/14/2022 12 65 (H) 4 31 - 10 16 Thousand/uL Final     Platelets   Date Value Ref Range Status   11/24/2022 264 149 - 390 Thousands/uL Final   11/14/2022 316 149 - 390 Thousands/uL Final          Mechelle Ramires MD  11/27/2022  6:58 AM

## 2022-11-27 NOTE — PLAN OF CARE
Problem: PAIN - ADULT  Goal: Verbalizes/displays adequate comfort level or baseline comfort level  Description: Interventions:  - Encourage patient to monitor pain and request assistance  - Assess pain using appropriate pain scale  - Administer analgesics based on type and severity of pain and evaluate response  - Implement non-pharmacological measures as appropriate and evaluate response  - Consider cultural and social influences on pain and pain management  - Notify physician/advanced practitioner if interventions unsuccessful or patient reports new pain  Outcome: Progressing     Problem: INFECTION - ADULT  Goal: Absence or prevention of progression during hospitalization  Description: INTERVENTIONS:  - Assess and monitor for signs and symptoms of infection  - Monitor lab/diagnostic results  - Monitor all insertion sites, i e  indwelling lines, tubes, and drains  - Monitor endotracheal if appropriate and nasal secretions for changes in amount and color  - [place] appropriate cooling/warming therapies per order  - Administer medications as ordered  - Instruct and encourage patient and family to use good hand hygiene technique  - Identify and instruct in appropriate isolation precautions for identified infection/condition  Outcome: Progressing  Goal: Absence of fever/infection during neutropenic period  Description: INTERVENTIONS:  - Monitor WBC    Outcome: Progressing     Problem: SAFETY ADULT  Goal: Patient will remain free of falls  Description: INTERVENTIONS:  - Educate patient/family on patient safety including physical limitations  - Instruct patient to call for assistance with activity   - Consult OT/PT to assist with strengthening/mobility   - Keep Call bell within reach  - Keep bed low and locked with side rails adjusted as appropriate  - Keep care items and personal belongings within reach  - Initiate and maintain comfort rounds  - Obtain necessary fall risk management equipment  - Apply yellow socks and bracelet for high fall risk patients  - Consider moving patient to room near nurses station  Outcome: Progressing  Goal: Maintain or return to baseline ADL function  Description: INTERVENTIONS:  -  Assess patient's ability to carry out ADLs; assess patient's baseline for ADL function and identify physical deficits which impact ability to perform ADLs (bathing, care of mouth/teeth, toileting, grooming, dressing, etc )  - Assess/evaluate cause of self-care deficits   - Assess range of motion  - Assess patient's mobility; develop plan if impaired  - Assess patient's need for assistive devices and provide as appropriate  - Encourage maximum independence but intervene and supervise when necessary  - Involve family in performance of ADLs  - Assess for home care needs following discharge   - Consider OT consult to assist with ADL evaluation and planning for discharge  - Provide patient education as appropriate  Outcome: Progressing  Goal: Maintains/Returns to pre admission functional level  Description: INTERVENTIONS:  - Perform BMAT or MOVE assessment daily    - Set and communicate daily mobility goal to care team and patient/family/caregiver     - Collaborate with rehabilitation services on mobility goals if consulted  - Perform Range of Motion   - Reposition patient  - Dangle patient  - Stand patient   - Ambulate patient   - Out of bed to chair  - Out of bed for meals   - Out of bed for toileting  - Record patient progress and toleration of activity level   Outcome: Progressing     Problem: DISCHARGE PLANNING  Goal: Discharge to home or other facility with appropriate resources  Description: INTERVENTIONS:  - Identify barriers to discharge w/patient and caregiver  - Arrange for needed discharge resources and transportation as appropriate  - Identify discharge learning needs (meds, wound care, etc )  - Arrange for interpretive services to assist at discharge as needed  - Refer to Case Management Department for coordinating discharge planning if the patient needs post-hospital services based on physician/advanced practitioner order or complex needs related to functional status, cognitive ability, or social support system  Outcome: Progressing     Problem: ALTERATION IN THE BREAST  Goal: Optimize infant feeding at the breast  Description: INTERVENTIONS:  - Latch, breast and nipple assessment  - Assess prior breast feeding history  - Hand expression of breast milk  - For cracked, bleeding and or sore nipples reassess latch, treat damaged nipple  -Educate mother on feeding cues  -Positioning/latch techniques  Outcome: Progressing     Problem: INADEQUATE LATCH, SUCK OR SWALLOW  Goal: Demonstrate ability to latch and sustain latch, audible swallowing and satiety  Description: INTERVENTIONS:  - Assess oral anatomy, notify Physician/AP for abnormal findings  - Establish milk expression  - Maximize feeding opportunity (skin to skin, behavioral state)  - Position/latch techniques  - Discourage use of pacifier-artificial nipple  - Mechanical pumping  - Nipple Shield  - Supplemental formula feeding (Physician/AP order)  - Alternative feeding method  Outcome: Progressing     Problem: POSTPARTUM  Goal: Experiences normal postpartum course  Description: INTERVENTIONS:  - Monitor maternal vital signs  - Assess uterine involution and lochia  Outcome: Progressing  Goal: Appropriate maternal -  bonding  Description: INTERVENTIONS:  - Identify family support  - Assess for appropriate maternal/infant bonding   -Encourage maternal/infant bonding opportunities  - Referral to  or  as needed  Outcome: Progressing  Goal: Establishment of infant feeding pattern  Description: INTERVENTIONS:  - Assess breast/bottle feeding  - Refer to lactation as needed  Outcome: Progressing  Goal: Incision(s), wounds(s) or drain site(s) healing without S/S of infection  Description: INTERVENTIONS  - Assess and document dressing, incision, wound bed, drain sites and surrounding tissue  - Provide patient and family education  - Perform skin care/dressing changes   Outcome: Progressing

## 2022-11-28 NOTE — UTILIZATION REVIEW
NOTIFICATION OF INPATIENT ADMISSION   MATERNITY/DELIVERY AUTHORIZATION REQUEST   SERVICING FACILITY:   [facility] - L&D, , NICU  [address] [place]  Naomy Fong, [address] [place] [address]  Tax ID: [id number]  NPI: [id number]   ATTENDING PROVIDER:  Attending Name and NPI#: James ThomasWilman  Address: [address] [place] [address]  Naomy Fong, [address] [place] [address]  Phone: [phone]   ADMISSION INFORMATION:  Place of Service: Inpatient [facility]  [address]  Place of Service Code: 21  Inpatient Admission Date/Time: 22  7:35 AM  Discharge Date/Time: 2022  6:20 PM  Admitting Diagnosis Code/Description:  35 weeks gestation of pregnancy [Z3A 35]  Encounter for full-term uncomplicated delivery [H43]     Mother: Vicente Chamorro 2000 Estimated Date of Delivery: 22  Delivering clinician: James Thomas    OB History        2    Para   1    Term   0       1    AB   0    Living   1       SAB   0    IAB   0    Ectopic   0    Multiple   0    Live Births   1                Name & MRN:   Information for the patient's :  Christi Santana [[id number]]     [place] Delivery Information:  Sex: male  Delivered 2022 1:55 AM by Vaginal, Spontaneous; Gestational Age: 27w7d     Measurements:  Weight: 5 lb 8 oz (2495 g); Height: 17 5"    APGAR 1 minute 5 minutes 10 minutes   Totals: 8 9      [place] Birth Information: 20 y o  female MRN: [id number] Unit/Bed#: -01   Birthweight: No birth weight on file  Gestational Age: <None> Delivery Type:    APGARS Totals:        UTILIZATION REVIEW CONTACT:  Guillermo Banks Utilization   Network Utilization Review Department  Phone: [phone]  Fax [phone]  Email: [email or web address]  Contact for approvals/pending authorizations, clinical reviews, and discharge       PHYSICIAN ADVISORY SERVICES:  Medical Necessity Denial & Aekj-vx-Xqnz Review  Phone: [phone]  Fax: [phone]  Email: [email or web address]  [email or web address]

## 2022-11-29 NOTE — UTILIZATION REVIEW
NOTIFICATION OF ADMISSION DISCHARGE   This is a Notification of Discharge from [facility]  Please be advised that this patient has been discharge from our facility  Below you will find the admission and discharge date and time including the patient’s disposition  UTILIZATION REVIEW CONTACT:  Mallory Garvin  Utilization   Network Utilization Review Department  Phone: [phone] x carefully listen to the prompts  All voicemails are confidential   Email: [email or web address]     ADMISSION INFORMATION  PRESENTATION DATE: 11/24/2022  6:05 AM  OBERVATION ADMISSION DATE:   INPATIENT ADMISSION DATE: 11/24/22  7:35 AM   DISCHARGE DATE: 11/27/2022  6:20 PM   DISPOSITION:Home/Self Care    IMPORTANT INFORMATION:  Send all requests for admission clinical reviews, approved or denied determinations and any other requests to dedicated fax number below belonging to the campus where the patient is receiving treatment   List of dedicated fax numbers:  [address] DENIALS (Administrative/Medical Necessity) [phone]   [address] (Maternity/NICU/Pediatrics) [phone]   [facility] [phone]   [place] [address] [phone]   [address] [phone]   220 [facility] [phone] 90 [facility] [phone]   [address] [place], [place] [address] 119 [phone]   [facility]  [phone]   [facility] [phone]   412 [facility] 850 [facility] [phone]

## 2022-12-16 ENCOUNTER — TELEPHONE (OUTPATIENT)
Dept: OBGYN CLINIC | Facility: CLINIC | Age: 22
End: 2022-12-16

## 2023-01-17 PROBLEM — O42.90 PROM (PREMATURE RUPTURE OF MEMBRANES): Status: ACTIVE | Noted: 2022-11-24
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
## 2023-01-17 NOTE — PROGRESS NOTES
Delivery date:2022  Vaginal  :      Baby : Male   @  35 weeks  5 days : Pre-term   Apgars: [email or web address]    and  9@5  Laceration :Right labia laceration   Breast feeding:  YES   Interested in discussing Birth control:  Any complication with delivery :  Breast Pain : YES  Abdominal Pain :  YES   Vaginal Pain: NO   Vaginal Bleeding : Some   Any problem with having bowel movements: YES using stool   Have you had intercourse since you delivered: YES     Depression score:

## 2023-01-17 NOTE — PATIENT INSTRUCTIONS
Vaginal Delivery   AMBULATORY CARE:   What you need to know about vaginal delivery:  A vaginal delivery occurs when your baby is born through your vagina (birth canal)  How to prepare for vaginal delivery: You can ask someone to be with you during labor and delivery  The person can be a spouse, friend, or family member  This person can help make you more comfortable  Arrange to be able to contact the person when labor begins  You may need tests to check for certain infections that can be passed to your baby  You may be given antibiotics to fight a bacterial infection you have or prevent an infection during delivery  Ask if it is okay to eat while you are in labor  Your healthcare provider can give you medicines for pain relief if you choose to have them  You may need medicine to induce (start) your labor if your labor is not moving forward  You may need to move in bed, stand, or walk to help your baby move into position for birth  What will happen during vaginal delivery: You can move into several positions during delivery  You can lie on your back, have your feet up in stirrups, or squat  You may feel pressure on your rectum  This pressure is caused by the movement of your baby's head down the birth canal  You may feel the urge to push  Your healthcare provider will have you push when you feel the urge  He or she will guide your baby out of the birth canal  Forceps or suction may be used to help deliver your baby  You may also need an episiotomy (incision) to make the vaginal opening larger  This will make more room for your baby  At least 1 minute after your baby is born, your healthcare provider will put clamps on the umbilical cord  The cord will then be cut  Your uterus will continue to contract after delivery to push out the placenta  You may be given medicine to prevent heavy bleeding when the placenta is pushed out   Your healthcare provider may close your episiotomy incision or any tears with stitches  What will happen after vaginal delivery:   Healthcare providers will examine your baby  Your baby may be placed on your chest right away  He or she may also start breastfeeding  Your baby will also be given vitamin K as a shot  Healthcare providers will examine you  Your blood pressure will be checked right after you give birth  Providers will check for vaginal bleeding, and check that your uterus is brandyn  Your temperature and heart rate will be checked regularly  You may be taken to another room to rest with your baby  Call for a healthcare provider if you are holding your baby and start to feel tired  The provider can put him or her in a bassinet near you while you rest or sleep  This will help prevent an accidental drop or fall of your baby  A healthcare provider may massage your abdomen several times to make your uterus firm  This can be uncomfortable  You may have abdominal pains for up to 3 days after you give birth because your uterus is still brandyn  The contractions help release blood from inside your uterus so it shrinks back to its normal size  These contractions may hurt more while you breastfeed your baby  Your healthcare provider may suggest you get out of bed to sit in a chair or walk  Activity can help prevent blood clots  You may be able to go home within 24 to 48 hours after delivery  If you need support at home, ask your healthcare provider about home visits by another healthcare provider  This healthcare provider can help you learn about breastfeeding, bottle feeding, baby care, and perineum care  Call your doctor or obstetrician if:   Your leg feels warm, tender, and painful  It may look swollen and red  You have a fever  You are urinating very little, or not at all  You have heavy vaginal bleeding that fills 1 or more sanitary pads in 1 hour  You feel weak, dizzy, or faint       Your abdominal or perineal pain does not go away, or gets worse  You feel depressed  You have questions or concerns about your condition or care  Medicines:   NSAIDs , such as ibuprofen, help decrease swelling, pain, and fever  This medicine is available with or without a doctor's order  NSAIDs can cause stomach bleeding or kidney problems in certain people  If you take blood thinner medicine, always ask your healthcare provider if NSAIDs are safe for you  Always read the medicine label and follow directions  Stool softeners  make it easier for you to have a bowel movement  You may need this medicine to treat or prevent constipation  Take your medicine as directed  Contact your healthcare provider if you think your medicine is not helping or if you have side effects  Tell him or her if you are allergic to any medicine  Keep a list of the medicines, vitamins, and herbs you take  Include the amounts, and when and why you take them  Bring the list or the pill bottles to follow-up visits  Carry your medicine list with you in case of an emergency  Activity:  Rest as much as possible  Try to keep all activities short  You may be able to do some exercise soon after you have your baby  Talk with your healthcare provider before you start exercising  If you work outside the home, ask when you can return to your job  Kegel exercises:  Kegel exercises may help your vaginal and rectal muscles heal faster  You can do Kegel exercises by tightening and relaxing the muscles around your vagina  Kegel exercises help make the muscles stronger  Breast care:  When your milk comes in, your breasts may feel full and hard  Ask how to care for your breasts, even if you are not breastfeeding  Constipation:  You may have constipation for a period of time after you have your baby  Do not try to push the bowel movement out if it is too hard  High-fiber foods and extra liquids can help you prevent constipation  Examples of high-fiber foods are fruit and bran   Prune juice and water are good liquids to drink  You may also be told to take over-the-counter fiber and stool softener medicines  Take these items as directed  Ask how to prevent or treat hemorrhoids  Perineum care: Your perineum is the area between your vagina and anus  Keep the area clean and dry  This will help it heal and prevent infection  Wash the area gently with soap and water when you bathe or shower  Rinse your perineum with warm water after you urinate or have a bowel movement  A warm sitz bath can help decrease pain  To take a sitz bath, fill a bathtub with 4 to 6 inches of warm water  You may also use a sitz bath pan that fits inside the toilet  Sit in the sitz bath for 20 minutes  Do this 2 to 3 times a day, or as directed  The warm water can help decrease pain and swelling  Vaginal discharge: You will have vaginal discharge, called lochia, after your delivery  The lochia is red or dark brown with clots for 1 to 3 days after the birth  The amount will decrease and turn pale pink or brown for 3 to 10 days  It will turn white or yellow on the 10th or 14th day  Use a sanitary pad instead of a tampon to prevent a vaginal infection  You will have lochia for up to 3 weeks after your baby is born  Monthly periods: Your period may start again within 7 to 9 weeks after your baby is born  If you are breastfeeding, it may take longer for your period to start again  You can still get pregnant again even though you do not have your monthly period  Talk with your healthcare provider about a birth control method if you do not want to get pregnant  Mood changes: Many new mothers have some kind of mood changes after delivery  Some of these changes occur because of lack of sleep, hormone changes, and caring for a new baby  Some mood changes can be more serious, such as postpartum depression  Talk with your healthcare provider if you feel unable to care for yourself or your baby    Sexual activity:  Do not have sex until your healthcare provider says it is okay  You may notice you have a decreased desire for sex, or sex may be painful  You may need to use a vaginal lubricant (gel) to help make sex more comfortable  Follow up with your doctor or obstetrician as directed:  Most women need to return 6 weeks after a vaginal delivery  Ask how to care for any wounds or stitches  Write down your questions so you remember to ask them during your visits  © Copyright [employer]  Information is for End User's use only and may not be sold, redistributed or otherwise used for commercial purposes  All illustrations and images included in CareNotes® are the copyrighted property of A D A M , Inc  or [facility] [employer]   The above information is an  only  It is not intended as medical advice for individual conditions or treatments  Talk to your doctor, nurse or pharmacist before following any medical regimen to see if it is safe and effective for you  Postpartum Bleeding   AMBULATORY CARE:   What you need to know about postpartum bleeding:  Postpartum bleeding is vaginal bleeding after childbirth  This bleeding is normal, whether your baby was born vaginally or by   It contains blood and the tissue that lined the inside of your uterus when you were pregnant  What to expect with postpartum bleeding:  Postpartum bleeding usually lasts at least 10 days, and may last longer than 6 weeks  Your bleeding may range from light (barely staining a pad) to heavy (soaking a pad in 1 hour)  Usually, you have heavier bleeding right after childbirth, which slows over the next few weeks until it stops  The bleeding is red or dark brown with clots for the first 1 to 3 days  It then turns pink for several days, and then becomes a white or yellow discharge until it ends  Call your local emergency number (911 in the [address] [facility]) if:   You are suddenly short of breath and feel lightheaded  You have sudden chest pain      You are breathing faster than normal     Your heart is beating faster than normal     Call your doctor or obstetrician if:   Your bleeding increases, or you have heavy bleeding that soaks a pad in 1 hour for 2 hours in a row  You have a fever  You pass large blood clots  You feel dizzy  You have a low back ache, abdominal pain or tenderness, or loss of appetite  You are urinating less than usual, or not at all  You have questions or concerns about your condition or care  What to expect with postpartum bleeding:  Postpartum bleeding usually lasts at least 10 days, and may last longer than 6 weeks  Your bleeding may range from light (barely staining a pad) to heavy (soaking a pad in 1 hour)  Usually, you have heavier bleeding right after childbirth, which slows over the next few weeks until it stops  The bleeding is red or dark brown with clots for the first 1 to 3 days  It then turns pink for several days, and then becomes a white or yellow discharge until it ends  Follow up with your obstetrician as directed:  Do not have sex until your obstetrician says it is okay  Write down your questions so you remember to ask them during your visits  © Copyright [employer] 2022 Information is for End User's use only and may not be sold, redistributed or otherwise used for commercial purposes  All illustrations and images included in CareNotes® are the copyrighted property of [employer] Inc  or Kaity Hampton  The above information is an  only  It is not intended as medical advice for individual conditions or treatments  Talk to your doctor, nurse or pharmacist before following any medical regimen to see if it is safe and effective for you  Postpartum Depression   AMBULATORY CARE:   Postpartum depression  is a mood disorder that occurs after your baby is born  Your symptoms may last up to 12 months after delivery  Your symptoms may become serious and affect your daily activities and relationships   The exact cause is not known  Hormone levels that increased during pregnancy suddenly drop after your baby is born  This can cause your symptoms  A past episode of postpartum depression or a family history of depression may increase your risk  A  may also be done if you are pregnant with more than 1 baby  Postpartum depression may also be trigged by a lack of support at home, stress, or medical problems  Common signs and symptoms include the following:   Feeling sad, anxious, tearful, discouraged, hopeless, or alone    Thoughts that you are not a good mother    Trouble completing daily tasks, concentrating, or remembering things    Lack of appetite    Lack interest in your baby    Feeling restless, irritable, or withdrawn    An overwhelmed feeling with your new baby and a belief that it will not get better    Feeling unimportant or guilty most of the time    Trouble sleeping, even after the baby is asleep    Call your local emergency number (911 in the [address] [facility] [address]) if:   You think about hurting yourself or your baby  Seek care immediately if:   Your feelings of depression or sadness are strong  Call your doctor if:   Your symptoms last most of the day for days in a row  Your symptoms last more than 1 week  You have questions or concerns about your condition or care  How postpartum depression is diagnosed:  Healthcare providers will talk to you about how you are feeling and ask if you have any depression  These talks can happen during appointments for your medical care and for your baby's care, such as well child visits   Talk to your providers about the following:  When you started to feel depressed, and if it is getting worse over time    Problems you are having with daily activities, sleep, or caring for your baby    If anything makes your depression worse, or makes you feel better    Feeling that you are not bonding with your baby the way you want    Any problems your baby has with sleeping or feeding    If your baby is fussy or cries a lot    Support you have from friends, family, or others    Treatment  may include any of the following:  A therapist  can help you find ways to cope with your feelings  This can be done alone or in a group  Antidepressants  help decrease or stop your symptoms  Self-care: You may feel better quickly, or if may take a few weeks to feel better  Be patient with yourself  Do the following to take care of yourself:  Rest as needed  Take a nap or rest while your baby sleeps  Ask someone to watch your baby while you nap  Get emotional support  Share your feelings with your partner, a friend, or another mother  Ask your partner, friends, or family to help with cooking or cleaning  Do only what is needed and let other things wait until later  Exercise when you can  Even 5 or 10 minutes of exercise can help improve your mood  Walk around the block or do some stretching exercises  Eat a variety of healthy foods  Healthy foods include fruits, vegetables, whole-grain breads, low-fat dairy products, beans, lean meats, and fish  Do not skip meals  Take care of yourself  Shower and dress each day  Write in a journal  Celebrate small achievements, even if it is only 1 thing a day  Try to get out of the house a little each day  Meet a friend for coffee  Follow up with your doctor as directed: Your doctor may refer you to a therapist or other specialist  Write down your questions so you remember to ask them during your visits  © [employer] 2022 Information is for End User's use only and may not be sold, redistributed or otherwise used for commercial purposes  All illustrations and images included in CareNotes® are the copyrighted property of [employer]  or [facility] Blaine Wan   The above information is an  only  It is not intended as medical advice for individual conditions or treatments   Talk to your doctor, nurse or pharmacist before following any medical regimen to see if it is safe and effective for you  Breastfeeding and Breast Engorgement   AMBULATORY CARE:   What you need to know about breast engorgement:  Breast engorgement develops when too much milk builds up in your breast  It is normal for your breasts to feel swollen, heavy, and tender when your milk comes in  This is called breast fullness  When your breast starts to feel painful and hard, the fullness has developed into engorgement  Breast engorgement usually happens 3 to 5 days after you give birth  Engorgement can happen if you are not breastfeeding or expressing milk often, or produce a lot of milk  Your baby may have a hard time latching on (attaching) to your breast to feed  Without treatment, engorgement can lead to plugged milk ducts or a breast infection called mastitis  Common symptoms include the following: A swollen, tender breast    A breast that feels hard to the touch or looks tight or shiny    Warm, red, or throbbing breast    Flat nipple    A low fever    Seek care immediately if:   You have a fever with chills or body aches  You have pain and swelling in one or both breasts that keeps you from breastfeeding  Contact your healthcare provider if:   You have a tender breast lump that grows slowly and usually forms on one side of your breast     You have a small, white bump on your nipple  Your symptoms do not get better within 24 hours  You have questions or concerns about your condition or care  Manage your symptoms:   Breastfeed or pump every 2 or 3 hours  Frequent breastfeeding helps decrease engorgement discomfort  Express or pump milk from your breasts before you breastfeed  This will help soften your breast and your nipple, and allow your baby to latch on better  Massage your breast   Breast massage helps empty your engorged breast and decrease pain   Gently massage your breast before and during breastfeeding to help increase your milk flow  Gently stroke your breast, starting from the outer areas and working your way toward the nipple  Breast massage may also help prevent breast engorgement if done in the first few days after you give birth  Apply a cool compress in between feedings  The cold may help decrease swelling and pain in your engorged breast  Wet a washcloth in cold water, wring it out, and place it on your breast  Ask how long and how often to use a cool compress  Wear a supportive bra  The bra should fit well but not be too tight  Prevent breast engorgement:   Help your baby get a good latch  Hold the nape of his or her neck to help him or her latch onto your breast  Touch his or her top lip with your nipple and wait for him or her to open his or her mouth wide  Your baby's lower lip and chin should touch the areola (dark area around the nipple) first  Help him or her get as much of the areola in his or her mouth as possible  You should feel as if your baby will not separate from your breast easily  Gently break suction and reposition if your baby is only sucking on the nipple  Talk to a lactation consultant if you need help with your baby's latch  Empty your breasts completely  Take your time when you breastfeed to allow your baby to empty your breast  Try not to switch breasts too early  Express or pump after you breastfeed if your baby is not emptying your breasts when he or she feeds  Apply warmth to your breast before you breastfeed  Put a warm, wet cloth on your breast or take a warm shower  This can help increase your milk flow  Follow up with your doctor as directed:  Write down your questions so you remember to ask them during your visits     For more information:   [employer] [address] [facility] , [address]  Phone: [phone]  Web Address: http://www [employer]/    [employer]  [address] [place] [address]  Phone: [address] [facility] [phone]  Phone: [phone]  Web Address: http://www [facility]/  Romy Lazaro 2022 Information is for End User's use only and may not be sold, redistributed or otherwise used for commercial purposes  All illustrations and images included in CareNotes® are the copyrighted property of A D A M , Inc  or [facility] Blaine Wan   The above information is an  only  It is not intended as medical advice for individual conditions or treatments  Talk to your doctor, nurse or pharmacist before following any medical regimen to see if it is safe and effective for you  Mastitis   AMBULATORY CARE:   Mastitis  is an infection of breast tissue that most often occurs in women who breastfeed  It can happen any time during breastfeeding, but usually occurs within the first 3 months after giving birth  Usually only one breast is affected  Common signs and symptoms include the following:   Chills and fever    Breast swelling, redness, warmth, and tenderness     Tenderness under your arm    Fatigue and body aches    Contact your healthcare provider if:   Your symptoms do not get better within 2 days  You have a painful lump in your breast      You have swollen and tender lymph nodes in your armpit on the same side as the affected breast     You have questions or concerns about your condition or care  Treatment:  You can continue to breastfeed your baby while you are being treated for mastitis  Breastfeeding when you have mastitis may help speed your recovery  You may need any of the following:  Antibiotics  help treat or prevent a bacterial infection  Acetaminophen  decreases pain and fever  It is available without a doctor's order  Ask how much to take and how often to take it  Follow directions  Acetaminophen can cause liver damage if not taken correctly  NSAIDs , such as ibuprofen, help decrease swelling, pain, and fever   This medicine is available with or without a doctor's order  NSAIDs can cause stomach bleeding or kidney problems in certain people  If you take blood thinner medicine, always ask your healthcare provider if NSAIDs are safe for you  Always read the medicine label and follow directions  Incision and drainage  may be needed if the swelling does not go away and an abscess forms  Your healthcare provider will make a small incision in your breast to drain the pus  Manage your symptoms:   Continue to breastfeed from the affected breast   This will help to prevent an abscess from forming  Breastfeed your baby on the affected side first  Apply a warm, wet cloth on your breast or take a warm shower before you feed your baby  This can help increase your milk flow  If it is painful when you breastfeed from the affected breast, feed your baby from the other breast first  Pump the affected side to completely drain your breast after breastfeeding, if needed  You may save the pumped milk to feed your baby  Use different positions to breastfeed  Change the position of your baby during feedings  This may help to relieve your discomfort  Apply heat on your breast for 20 to 30 minutes every 2 hours for as many days as directed  Heat helps decrease pain  Apply ice after feedings  Apply ice on your breast for 15 to 20 minutes every hour or as directed  Use an ice pack, or put crushed ice in a plastic bag  Cover it with a towel  Ice helps prevent tissue damage and decreases swelling and pain  Massage your breast   Gently massage your breast before and during breastfeeding to help drain your milk  Drink liquids as directed  Ask how much liquid to drink each day and which liquids are best for you  Rest as needed  Do not sleep on your stomach until your infection is gone  Prevent mastitis:   Breastfeed every 2 or 3 hours to prevent engorgement    Breast engorgement develops when too much milk builds up in your breast  Take your time when you breastfeed to allow your baby to empty your breast  Try not to switch breasts too early  Express or pump after you breastfeed if your baby is not emptying your breasts when he or she feeds  Prevent sore and cracked nipples  A good latch prevents sore and cracked nipples  If you have sore nipples after breastfeeding, your baby may not be latched on properly  Gently break suction and reposition if your baby is only sucking on the nipple  Talk to a lactation consultant if you need help with your baby's latch  Care for your breasts  Keep your nipples clean and dry between feedings  Check them for cracks, blisters, or other irritated areas  Ask a lactation specialist or your healthcare provider how to treat sore and cracked nipples  Wash your hands before and after you breastfeed your baby or pump your breasts  Wear a comfortable nursing bra that supports your breasts but is not too tight  Follow up with your doctor as directed:  Write down your questions so you remember to ask them during your visits  © [employer] 2022 Information is for End User's use only and may not be sold, redistributed or otherwise used for commercial purposes  All illustrations and images included in CareNotes® are the copyrighted property of A D A M , Inc  or [facility] [email or web address]   The above information is an  only  It is not intended as medical advice for individual conditions or treatments  Talk to your doctor, nurse or pharmacist before following any medical regimen to see if it is safe and effective for you  Caring for Your Baby   WHAT YOU NEED TO KNOW:   Care for your baby includes keeping him or her safe, clean, and comfortable  Your baby will cry or make noises to let you know when he or she needs something  You will learn to tell what your baby needs by the way he or she cries  Your baby will move in certain ways when he or she needs something, such as sucking on a fist when hungry    DISCHARGE INSTRUCTIONS:   Call your local emergency number (911 in the [address]) if:   You feel like hurting your baby  Call your baby's pediatrician if:   Your baby's abdomen is hard and swollen, even when he or she is calm and resting  You feel depressed and cannot take care of your baby  Your baby's lips or mouth are blue and he or she is breathing faster than usual     Your baby's armpit temperature is higher than 99°F (37 2°C)  Your baby's eyes are red, swollen, or draining yellow pus  Your baby coughs often during the day, or chokes during each feeding  Your baby does not want to eat  Your baby cries more than usual and you cannot calm him or her down  Your baby's skin turns yellow or he or she has a rash  You have questions or concerns about caring for your baby  What to feed your baby:   Breast milk is the only food your baby needs for the first 6 months of life  If possible, only breastfeed (no formula) him or her for the first 6 months  Breastfeeding is recommended for at least the first year of your baby's life, even when he or she starts eating food  You may pump your breasts and feed breast milk from a bottle  You may feed your baby formula from a bottle if breastfeeding is not possible  Talk to your baby's pediatrician about the best formula for your baby  He or she can help you choose one that contains iron  Do not add cereal to the milk or formula  Your baby may get too many calories during a feeding  You can make more if your baby is still hungry after he or she finishes a bottle  How much to feed your baby: Your baby may want different amounts each day  The amount of formula or breast milk your baby drinks may change with each feeding and each day  The amount your baby drinks depends on his or her weight, how fast he or she is growing, and how hungry he or she is  Your baby may want to drink a lot one day and not want to drink much the next       Do not overfeed your baby   Overfeeding means your baby gets too many calories during a feeding  This may cause him or her to gain weight too fast  Your baby may also continue to overeat later in life  Look for signs that your baby is done feeding  Your baby may look around instead of watching you  He or she may chew on the nipple of the bottle rather than suck on it  He or she may also cry and try to wriggle away from the bottle or out of the high chair  Feed your baby each time he or she is hungry:      Babies up to 2 months old  will drink about 2 to 4 ounces at each feeding  He or she will probably want to drink every 3 to 4 hours  Wake your baby to feed him or her if he or she sleeps longer than 4 to 5 hours  Babies 2 to 10 months old  should drink 4 to 5 bottles each day  He or she will drink 4 to 6 ounces at each feeding  When your baby is 2 to 1 months old, he or she may begin to sleep through the night  When this happens, you may stop waking up to give your baby formula or breast milk in the night  If you are giving your baby breast milk, you may still need to wake up to pump your breasts  Store the milk for your baby to drink at a later time  Babies 6 to 13 months old  should drink 3 to 5 bottles every day  He or she may drink up to 8 ounces at each feeding  You may increase the time between feedings if your baby is not hungry  You may also start to feed your baby foods at 6 months  Ask your child's pediatrician for more information about the right foods to feed your baby  How to help your baby latch on correctly for breastfeeding:  Help your baby move his or her head to reach your breast  Hold the nape of his or her neck to help him or her latch onto your breast  Touch his or her top lip with your nipple and wait for him or her to open his or her mouth wide   Your baby's lower lip and chin should touch the areola (dark area around the nipple) first  Help him or her get as much of the areola in his or her mouth as possible  You should feel as if your baby will not separate from your breast easily  A correct latch helps your baby get the right amount of milk at each feeding  Allow your baby to breastfeed for as long as he or she is able  Signs of correct latch-on:   You can hear your baby swallow  Your baby is relaxed and takes slow, deep mouthfuls  Your breast or nipple does not hurt during breastfeeding  Your baby is able to suckle milk right away after he or she latches on  Your nipple is the same shape when your baby is done breastfeeding  Your breast is smooth, with no wrinkles or dimples where your baby is latched on  Feed your baby safely:   Hold your baby upright to feed him or her  Do not prop your baby's bottle  Your baby could choke while you are not watching, especially in a moving vehicle  Do not use a microwave to heat your baby's bottle  The milk or formula will not heat evenly and will have spots that are very hot  Your baby's face or mouth could be burned  You can warm the milk or formula quickly by placing the bottle in a pot of warm water for a few minutes  How to burp your baby:  Tha Case your baby when you switch breasts or after every 2 to 3 ounces from a bottle  Burp him or her again when he or she is finished eating  Your baby may spit up when he or she burps  This is normal  Hold your baby in any of the following positions to help him or her burp:  Hold your baby against your chest or shoulder  Support his or her bottom with one hand  Use your other hand to pat or rub his or her back gently  Sit your baby upright on your lap  Use one hand to support his or her chest and head  Use the other hand to pat or rub his or her back  Place your baby across your lap  He or she should face down with his or her head, chest, and belly resting on your lap  Hold him or her securely with one hand and use your other hand to rub or pat his or her back      How to change your baby's diaper:  Never leave your baby alone when you change his or her diaper  If you need to leave the room, put the diaper back on and take your baby with you  Wash your hands before and after you change your baby's diaper  Put a blanket or changing pad on a safe surface  Marlin Pattee your baby down on the blanket or pad  Remove the dirty diaper and clean your baby's bottom  If your baby had a bowel movement, use the diaper to wipe off most of the bowel movement  Clean your baby's bottom with a wet washcloth or diaper wipe  Do not use diaper wipes if your baby has a rash or circumcision that has not yet healed  Gently lift both legs and wash the buttocks  Always wipe from front to back  Clean under all skin folds and between creases  Apply ointment or petroleum jelly as directed if your baby has a rash  Put on a clean diaper  Lift both your baby's legs and slide the clean diaper beneath his or her buttocks  Gently direct your baby boy's penis down as the diaper is put on  Fold the diaper down if your baby's umbilical cord has not fallen off  How to care for your baby's skin:  Sponge bathe your baby with warm water and a cleanser made for a baby's skin  Do not use baby oil, creams, or ointments  These may irritate your baby's skin or make skin problems worse  Ask for more information on sponge bathing your baby  Fontanelles  (soft spots) on your baby's head are usually flat  They may bulge when your baby cries or strains  It is normal to see and feel a pulse beating under a soft spot  It is okay to touch and wash your baby's soft spots  Skin peeling  is common in babies who are born after their due date  Peeling does not mean that your baby's skin is too dry  You do not need to put lotions or oils on your 's skin to stop the peeling or to treat rashes  Bumps, a rash, or acne  may appear about 3 days to 5 weeks after birth  Bumps may be white or yellow   Your baby's cheeks may feel rough and may be covered with a red, oily rash  Do not squeeze or scrub the skin  When your baby is 1 to 2 months old, his or her skin pores will begin to naturally open  When this happens, the skin problems will go away  A lip callus (thickened skin)  may form on your baby's upper lip during the first month  It is caused by sucking and should go away within the first year  This callus does not bother your baby, so you do not need to remove it  How to clean your baby's ears and nose:   Use a wet washcloth or cotton ball  to clean the outer part of your baby's ears  Do not put cotton swabs into your baby's ears  These can hurt his or her ears and push earwax in  Earwax should come out of your baby's ear on its own  Talk to your baby's pediatrician if you think your baby has too much earwax  Use a rubber bulb syringe  to suction your baby's nose if he or she is stuffed up  Point the bulb syringe away from his or her face and squeeze the bulb to create a vacuum  Gently put the tip into one of your baby's nostrils  Close the other nostril with your fingers  Release the bulb so that it sucks out the mucus  Repeat if necessary  Boil the syringe for 10 minutes after each use  Do not put your fingers or cotton swabs into your baby's nose  How to care for your baby's eyes:  A  baby's eyes usually make just enough tears to keep his or her eyes wet  By 7 to 7 months old, your baby's eyes will develop so they can make more tears  Tears drain into small ducts at the inside corners of each eye  A blocked tear duct is common in newborns  A possible sign of a blocked tear duct is a yellow sticky discharge in one or both of your baby's eyes  Your baby's pediatrician may show you how to massage your baby's tear ducts to unplug them  How to care for your baby's fingernails and toenails:  Your baby's fingernails are soft, and they grow quickly  You may need to trim them with baby nail clippers 1 or 2 times each week   Be careful not to cut too closely to the skin because you may cut the skin and cause bleeding  It may be easier to cut your baby's fingernails when he or she is asleep  Your baby's toenails may grow much slower  They may be soft and deeply set into each toe  You will not need to trim them as often  How to care for your baby's umbilical cord stump:  Your baby's umbilical cord stump will dry and fall off in about 7 to 21 days, leaving a belly button  If your baby's stump gets dirty from urine or bowel movement, wash it off right away with water  Gently pat the stump dry  This will help prevent infection around your baby's cord stump  Fold the front of the diaper down below the cord stump to let it air dry  Do not cover or pull at the cord stump  How to care for your baby boy's circumcision:  Your baby's penis may have a plastic ring that will come off within 8 days  His penis may be covered with gauze and petroleum jelly  Keep your baby's penis as clean as possible  Clean it with warm water only  Gently blot or squeeze the water from a wet cloth or cotton ball onto the penis  Do not use soap or diaper wipes to clean the circumcision area  This could sting or irritate your baby's penis  Your baby's penis should heal in about 7 to 10 days  What to do when your baby cries:  Your baby may cry because he or she is hungry  He or she may have a wet diaper, or be hot or cold  He or she may cry for no reason you can find  It can be hard to listen to your baby cry and not be able to calm him or her down  Ask for help and take a break if you feel stressed or overwhelmed  Never shake your baby to try to stop his or her crying  This can cause blindness or brain damage  The following may help comfort your baby:  Hold your baby skin to skin and rock him or her, or swaddle him or her in a soft blanket  Gently pat your baby's back or chest  Stroke or rub his or her head      Quietly sing or talk to your baby, or play soft, soothing music  Put your baby in his or her car seat and take him or her for a drive, or go for a stroller ride  Burp your baby to get rid of extra gas  Give your baby a soothing, warm bath  How to keep your baby safe when he or she sleeps:   Always lay your baby on his or her back to sleep  This position can help reduce your baby's risk for sudden infant death syndrome (SIDS)  Keep the room at a temperature that is comfortable for an adult  Do not let the room get too hot or cold  Use a crib or bassinet that has firm sides  Do not let your baby sleep on a soft surface such as a waterbed or couch  He or she could suffocate if his or her face gets caught in a soft surface  Use a firm, flat mattress  Cover the mattress with a fitted sheet that is made especially for the type of mattress you are using  Remove all objects, such as toys, pillows, or blankets, from your baby's bed while he or she sleeps  Ask for more information on childproofing  How to keep your baby safe in the car: Always buckle your baby into a child safety seat  A child safety seat is a padded seat that secures infants and children while they ride in a car  Every child safety seat has age, height, and weight ranges  Keep using the safety seat until your child reaches the maximum of the range  Then he or she is ready for the child safety seat that is the next size up  Only use child safety seats  Do not use a toy chair or prop your child on books or other objects  Make sure you have a safety seat that meets safety standards  Place your child safety seat in the middle of the back seat  The safety seat should not move more than 1 inch in any direction after you secure it  Always follow the instructions provided to help you position the safety seat  The instructions will also guide you on how to secure your child properly  Make sure the child safety seat has a harness and clip    The harness is made of straps that go over your child's shoulders  The straps connect to a buckle that rests over your child's abdomen  These straps keep your child in the seat during an accident  Another strap comes up from the bottom of the seat and connects to the buckle between your child's legs  This strap keeps your child from slipping out of the seat  Slide the clip up and down the shoulder straps to make them tighter or looser  You should be able to slip a finger between your child and the strap  Follow up with your baby's pediatrician as directed:  Write down your questions so you remember to ask them during your visits  © Copyright [employer]  Information is for End User's use only and may not be sold, redistributed or otherwise used for commercial purposes  All illustrations and images included in CareNotes® are the copyrighted property of A D A M , Inc  or Kaity Wan   The above information is an  only  It is not intended as medical advice for individual conditions or treatments  Talk to your doctor, nurse or pharmacist before following any medical regimen to see if it is safe and effective for you  Self Care After Delivery   AMBULATORY CARE:   The postpartum period  is the period of time from delivery to about 6 weeks  During this time you may experience many physical and emotional changes  It is important to understand what is normal and when you need to call your healthcare provider  It is also important to know how to care for yourself during this time  Call your local emergency number (911 in the [address] [place] [address],[address]) for any of the following: You see or hear things that are not there, or have thoughts of harming yourself or your baby  You soak through 1 pad in 15 minutes, have blurry vision, clammy or pale skin, and feel faint  You faint or lose consciousness  You have trouble breathing  You cough up blood  Your  incision comes apart      Seek care immediately if:   Your heart is beating faster than usual     You have a bad headache or changes in your vision  Your episiotomy or  incision is red, swollen, bleeding, or draining pus  You have severe abdominal pain  Call your doctor or obstetrician if:   Your leg is painful, red, and larger than usual     You soak through 1 or more pads in an hour, or pass blood clots larger than a quarter from your vagina  You have a fever  You have new or worsening pain in your abdomen or vagina  You continue to have depression 1 to 2 weeks after you deliver  You have trouble sleeping  You have foul-smelling discharge from your vagina  You have pain or burning when you urinate  You do not have a bowel movement for 3 days or more  You have nausea or are vomiting  You have hard lumps or red streaks over your breasts  You have cracked nipples or bleed from your nipples  You have questions or concerns about your condition or care  Physical changes: The following are normal changes after you give birth:  Pain in the area between your anus and vagina    Breast pain    Constipation or hemorrhoids    Hot or cold flashes    Vaginal bleeding or discharge    Mild to moderate abdominal cramping    Difficulty controlling bowel movements or urine    Emotional changes:  A drop in hormone levels after you deliver may cause changes in your emotions  You may feel irritable, sad, or anxious  You may cry easily or for no reason  You may also feel depressed  Depression that continues can be a sign of postpartum depression, a condition that can be treated  Treatment may include talk therapy, medicines, or both  Healthcare providers will ask how you are feeling and if you have any depression  These talks can happen during appointments for your medical care and for your baby's care, such as well child visits  Providers can help you find ways to care for yourself and your baby   Talk to your providers about the following:  When emotional changes or depression started, and if it is getting worse over time    Problems you are having with daily activities, sleep, or caring for your baby    If anything makes you feel worse, or makes you feel better    Feeling that you are not bonding with your baby the way you want    Any problems your baby has with sleeping or feeding    Your baby is fussy or cries a lot    Support you have from friends, family, or others    Breast care for breastfeeding mothers: You may have sore breasts for 3 to 6 days after you give birth  This happens as your milk begins to fill your breasts  You may also have sore breasts if you do not breastfeed frequently  Do the following to care for your breasts:  Apply a moist, warm, compress to your breast as directed  This may help soothe your breasts  Make sure the washcloth is not too hot before you apply it to your breast     Nurse your baby or pump your milk frequently  This may prevent clogged milk ducts  Ask your healthcare provider how often to nurse or pump  Massage your breasts as directed  This may help increase your milk flow  Gently rub your breasts in a circular motion before you breastfeed  You may need to gently squeeze your breast or nipple to help release milk  You can also use a breast pump to help release milk from your breast     Wash your breasts with warm water only  Do not put soap on your nipples  Soap may cause your nipples to become dry  Apply lanolin cream to your nipples as directed  Lanolin cream may add moisture to your skin and prevent nipple dryness  Always  wash off lanolin cream with warm water before you breastfeed  Place pads in your bra  Your nipples may leak milk when you are not breastfeeding  You can place pads inside of your bra to help prevent leaking onto your clothing  Ask your healthcare provider where to purchase bra pads  Get breastfeeding support if needed    Healthcare providers can answer questions about breastfeeding and provide you with support  Ask your healthcare provider who you can contact if you need breastfeeding support  Breast care for non-breastfeeding mothers:  Milk will fill your breasts even if you bottle feed your baby  Do the following to help stop your milk from filling your breasts and causing pain:  Wear a bra with support at all times  A sports bra or a tight-fitting bra will help stop your milk from coming in  Apply ice on each breast for 15 to 20 minutes every hour or as directed  Use an ice pack, or put crushed ice in a plastic bag  Cover it with a towel before you apply it to your breast  Ice helps your milk ducts shrink  Keep your breasts away from warm water  Warm water will make it easier for milk to fill your breasts  Stand with your breasts away from warm water in the shower  Limit how much you touch your breasts  This will prevent them from filling with milk  Perineum care: Your perineum is the area between your rectum and vagina  It is normal to have swelling and pain in this area after you give birth  If you had an episiotomy, your healthcare provider may give you special instructions  Clean your perineum after you use the bathroom  This may prevent infection and help with healing  Use a spray bottle with warm water to clean your perineum  You may also gently spray warm water against your perineum when you urinate  Always wipe front to back  Take a sitz bath as directed  A sitz bath may help relieve swelling and pain  Fill your bath tub or bucket with water up to your hips and sit in the water  Use cold water for 2 days after you deliver  Then use warm water  Ask your healthcare provider for more information about a sitz bath  Apply ice packs for the first 24 hours or as directed  Use a plastic glove filled with ice or buy an ice pack  Wrap the ice pack or plastic glove in a small towel or wash cloth   Place the ice pack on your perineum for 20 minutes at a time  Sit on a donut-shaped pillow  This may relieve pressure on your perineum when you sit  Use wipes that contain medicine or take pills as directed  Your healthcare provider may tell you to use witch hazel pads  You can place witch hazel pads in the refrigerator before you apply them to your perineum  Your provider may also tell you to take NSAIDs  Ask him or her how often to take pills or use the wipes  Do not go swimming or take tub baths for 4 to 6 weeks or as directed  This will help prevent an infection in your vagina or uterus  Bowel and bladder care: It may take 3 to 5 days to have a bowel movement after you deliver your baby  You can do the following to prevent or manage constipation, and get control of your bowel or bladder:  Take stool softeners as directed  A stool softener is medicine that will make your bowel movements softer  This may prevent or relieve constipation  A stool softener may also make bowel movements less painful  Drink plenty of liquids  Ask how much liquid to drink each day and which liquids are best for you  Liquids may help prevent constipation  Eat foods high in fiber  Examples include fruits, vegetables, grains, beans, and lentils  Ask your healthcare provider how much fiber you need each day  Fiber may prevent constipation  Do Kegel exercises as directed  Kegel exercises will help strengthen the muscles that control bowel movements and urination  Ask your healthcare provider for more information on Kegel exercises  Apply cold compresses or medicine to hemorrhoids as directed  This may relieve swelling and pain  Your healthcare provider may tell you to apply ice or wipes that contain medicine to your hemorrhoids  He or she may also tell you to use a sitz bath  Ask your provider for more information on how to manage hemorrhoids  Nutrition:  Good nutrition is important in the postpartum period   It will help you return to a healthy weight, increase your energy levels, and prevent constipation  It will also help you get enough nutrients and calories if you are going to breastfeed your baby  Eat a variety of healthy foods  Healthy foods include fruits, vegetables, whole-grain breads, low-fat dairy products, beans, lean meats, and fish  You may need 500 to 700 extra calories each day if you breastfeed your baby  You may also need extra protein  Limit foods with added sugar and high amounts of fat  These foods are high in calories and low in healthy nutrients  Read food labels so you know how much sugar and fat is in the food you want to eat  Drink 8 to 10 glasses of water per day  Water will help you make plenty of milk for your baby  It will also help prevent constipation  Drink a glass of water every time you breastfeed your baby  Take vitamins as directed  Ask your healthcare provider what vitamins you need  Limit caffeine and alcohol if you are breastfeeding  Caffeine and alcohol can get into your breast milk  Caffeine and alcohol can make your baby fussy  They can also interfere with your baby's sleep  Ask your healthcare provider if you can drink alcohol or caffeine  Rest and sleep: You may feel very tired in the postpartum period  Enough sleep will help you heal and give you energy to care for your baby  The following may help you get sleep and rest:  Nap when your baby naps  Your baby may nap several times during the day  Get rest during this time  Limit visitors  Many people may want to see you and your baby  Ask friends or family to visit on different days  This will give you time to rest     Do not plan too much for one day  Put off household chores so that you have time to rest  Gradually do more each day  Ask for help from family, friends, or neighbors  Ask them to help you with laundry, cleaning, or errands  Also ask someone to watch the baby while you take a nap or relax   Ask your partner to help with the care of your baby  Pump some of your breast milk so your partner can feed your baby during the night  Exercise after delivery:  Wait until your healthcare provider says it is okay to exercise  Exercise can help you lose weight, increase your energy levels, and manage your mood  It can also prevent constipation and blood clots  Start with gentle exercises such as walking  Do more as you have more energy  You may need to avoid abdominal exercises for 1 to 2 weeks after you deliver  Talk to your healthcare provider about an exercise plan that is right for you  Sexual activity after delivery:   Do not have sex until your healthcare provider says it is okay  You may need to wait 4 to 6 weeks before you have sex  This may prevent infection and allow time to heal     Your menstrual cycle may begin as soon as 3 weeks after you deliver  Your period may be delayed if you breastfeed your baby  You can become pregnant before you get your first postpartum period  Talk to your healthcare provider about birth control that is right for you  Some types of birth control are not safe during breastfeeding  For support and more information:  Join a support group for new mothers  Ask for help from family and friends with chores, errands, and care of your baby  Office of Bailey Clancy, [facility]  [address], [address] [place] [address]  [address], [address] [place]  [place] , [address]  Phone: [phone]  Web Address: www womenshealth gov    [facility] Postpartum [address] [facility] , [address] [place] [address]  [facility] , [address] [place] [address]  Web Address: [employer]  org/pregnancy/postpartum-care  aspx    Follow up with your doctor or obstetrician as directed: You will need to follow up within 2 to 6 weeks of delivery  Write down your questions so you remember to ask them at your visits    © Copyright [employer] 2022 Information is for [employer] use only and may not be sold, redistributed or otherwise used for commercial purposes  All illustrations and images included in CareNotes® are the copyrighted property of A D A M , Inc  or Kaity Hampton  The above information is an  only  It is not intended as medical advice for individual conditions or treatments  Talk to your doctor, nurse or pharmacist before following any medical regimen to see if it is safe and effective for you  Birth Control Pills     Birth control pills  are also called oral contraceptives, or the pill  It is medicine that helps prevent pregnancy by stopping ovulation  Ovulation is when the ovaries make and release an egg cell each month  If this egg gets fertilized by sperm, pregnancy occurs  You will need to take the pill at the same time every day  Your healthcare provider will tell you when to start taking the pill  You will also be told what to do if you miss a dose  Instructions will depend on the kind of birth control pills you are taking  Different kinds of birth control pills:  Some kinds are taken for 21 days in a row, followed by 7 days of placebo (no hormones) pills  Other kinds are taken for 24 days followed by 4 days of placebos  Each kind has a certain amount of female hormones  Your provider will decide on the kind that is best for you based on your age and other health conditions  Call your local emergency number (911 in the [address] [place] [address],[address]) for any of the following: You have any of the following signs of a stroke:      Numbness or drooping on one side of your face     Weakness in an arm or leg    Confusion or difficulty speaking    Dizziness, a severe headache, or vision loss    You feel lightheaded, short of breath, and have chest pain  You cough up blood  Seek care immediately if:   Your arm or leg feels warm, tender, and painful  It may look swollen and red      You have severe pain, numbness, or swelling in your arms or legs  Contact your healthcare provider if:   You have forgotten to take a birth control pill  You have mood changes, such as depression, since starting birth control pills  You have nausea or are vomiting  You have severe abdominal pain  You missed a period and have questions or concerns about being pregnant  You still have bleeding 4 months after taking birth control pills correctly  You have questions or concerns about your condition or care  What may be done before you can start taking birth control pills: You need to see your healthcare provider to get a prescription  Any of the following may be done before your healthcare provider gives you a prescription:  Your healthcare provider will ask about diseases and illnesses you have had in the past  Your provider will check your risk for blood clots, heart conditions, or stroke  Tell your provider if you had gastric bypass surgery  This surgery can affect the way your body absorbs medicines such as birth control pills  Your provider will also check your blood pressure, and may do a breast and pelvic exam  A Pap smear may also be done during the pelvic exam  This is a test to make sure you do not have abnormal changes on your cervix  You may need other tests, such as a urine test to make sure you are not pregnant  Your provider will ask if you take any medicines and if you smoke  Smoking increases your risk for stroke, heart attack, or a blood clot in your lungs  If you smoke, you should not take certain kinds of birth control pills  Advantages of birth control pills:  When birth control pills are used correctly, the chances of getting pregnant are very low  Birth control pills may help decrease bleeding and pain during your monthly period  They may also help prevent cancer of the uterus and ovaries  Disadvantages of birth control pills:   You may have sudden changes in your mood or feelings while you take birth control pills  You may have nausea and a decreased sex drive  You may have an increased appetite and rapid weight gain  You may also have bleeding in between periods, less frequent periods, vaginal dryness, and breast pain  Birth control pills will not protect you from sexually transmitted infections  Rarely, some birth control pills can increase your risk for a blood clot  This may become life-threatening  If you decide you want to get pregnant: If you are planning to have a baby, ask your healthcare provider when you may stop taking your birth control pills  It may take some time for you to start ovulating again  Ask your healthcare provider for more information about pregnancy after birth control pills  When to start taking birth control pills after you have a baby: If you are not breastfeeding, you may start taking birth control pills 3 weeks after you give birth  You may be able to take certain types of birth control pills if you are breastfeeding  These pills can be started from 6 weeks to 6 months after you give birth  Ask your healthcare provider for more information about when to start taking birth control pills after you give birth  What you need to know about birth control pills and menopause:   Talk with your healthcare provider if you want to take birth control pills around menopause  Around age 39, you will enter into perimenopause  This means your hormone levels are dropping and you are ovulating less often  You can still become pregnant during this time  The risk for problems, such as miscarriage, are higher if you become pregnant after age 39  Birth control pills will prevent pregnancy, and may also help prevent or relieve some signs and symptoms of menopause  Examples are hot flashes and mood swings  Your provider will do tests when you are around age 48  The tests may show that you are in menopause   If the tests do not show menopause for sure, you may be able to continue taking the pill up to age 54  The decision will depend on your health and if you have any medical conditions, such as a blood clot  Do not smoke:  Nicotine and other chemicals in cigarettes and cigars increase your risk for a blood clot while you use birth control pills  The risk is higher if you are also 35 or older  Ask your healthcare provider for information if you currently smoke and need help to quit  E-cigarettes or smokeless tobacco still contain nicotine  Talk to your healthcare provider before you use these products  Follow up with your healthcare provider as directed:  Write down your questions so you remember to ask them during your visits  © Copyright [address] Information is for End User's use only and may not be sold, redistributed or otherwise used for commercial purposes  All illustrations and images included in CareNotes® are the copyrighted property of A D A M , Inc  or [facility]   The above information is an  only  It is not intended as medical advice for individual conditions or treatments  Talk to your doctor, nurse or pharmacist before following any medical regimen to see if it is safe and effective for you

## 2023-01-18 ENCOUNTER — POSTPARTUM VISIT (OUTPATIENT)
Dept: OBGYN CLINIC | Facility: CLINIC | Age: 23
End: 2023-01-18

## 2023-01-18 VITALS
DIASTOLIC BLOOD PRESSURE: 80 MMHG | WEIGHT: 154 LBS | HEIGHT: 65 IN | BODY MASS INDEX: 25.66 KG/M2 | SYSTOLIC BLOOD PRESSURE: 118 MMHG

## 2023-01-18 DIAGNOSIS — Z72.51 UNPROTECTED SEX: ICD-10-CM

## 2023-01-18 LAB — SL AMB POCT URINE HCG: NEGATIVE

## 2023-01-18 RX ORDER — ACETAMINOPHEN AND CODEINE PHOSPHATE 120; 12 MG/5ML; MG/5ML
1 SOLUTION ORAL DAILY
Qty: 84 TABLET | Refills: 1 | Status: SHIPPED | OUTPATIENT
Start: 2023-01-18

## 2023-01-26 ENCOUNTER — TELEPHONE (OUTPATIENT)
Dept: PSYCHIATRY | Facility: CLINIC | Age: 23
End: 2023-01-26

## 2023-01-26 NOTE — TELEPHONE ENCOUNTER
Called patient regarding referral to be placed on proper wait list  LVM for patient to call intake dept ([phone]) back

## 2023-11-28 ENCOUNTER — OFFICE VISIT (OUTPATIENT)
Dept: URGENT CARE | Facility: CLINIC | Age: 23
End: 2023-11-28
Payer: COMMERCIAL

## 2023-11-28 VITALS
OXYGEN SATURATION: 100 % | RESPIRATION RATE: 16 BRPM | HEART RATE: 94 BPM | TEMPERATURE: 98.6 F | DIASTOLIC BLOOD PRESSURE: 76 MMHG | SYSTOLIC BLOOD PRESSURE: 110 MMHG

## 2023-11-28 DIAGNOSIS — B96.89 BACTERIAL VAGINOSIS IN PREGNANCY: Primary | ICD-10-CM

## 2023-11-28 DIAGNOSIS — N89.8 VAGINAL DISCHARGE: ICD-10-CM

## 2023-11-28 DIAGNOSIS — O23.599 BACTERIAL VAGINOSIS IN PREGNANCY: Primary | ICD-10-CM

## 2023-11-28 LAB
SL AMB  POCT GLUCOSE, UA: NORMAL
SL AMB LEUKOCYTE ESTERASE,UA: NORMAL
SL AMB POCT BILIRUBIN,UA: NORMAL
SL AMB POCT BLOOD,UA: NORMAL
SL AMB POCT CLARITY,UA: CLEAR
SL AMB POCT COLOR,UA: YELLOW
SL AMB POCT KETONES,UA: NORMAL
SL AMB POCT NITRITE,UA: NORMAL
SL AMB POCT PH,UA: 5
SL AMB POCT SPECIFIC GRAVITY,UA: 1.01
SL AMB POCT URINE HCG: POSITIVE
SL AMB POCT URINE PROTEIN: NORMAL
SL AMB POCT UROBILINOGEN: 0.2

## 2023-11-28 PROCEDURE — 87591 N.GONORRHOEAE DNA AMP PROB: CPT | Performed by: STUDENT IN AN ORGANIZED HEALTH CARE EDUCATION/TRAINING PROGRAM

## 2023-11-28 PROCEDURE — 87491 CHLMYD TRACH DNA AMP PROBE: CPT | Performed by: STUDENT IN AN ORGANIZED HEALTH CARE EDUCATION/TRAINING PROGRAM

## 2023-11-28 PROCEDURE — 87086 URINE CULTURE/COLONY COUNT: CPT | Performed by: STUDENT IN AN ORGANIZED HEALTH CARE EDUCATION/TRAINING PROGRAM

## 2023-11-28 PROCEDURE — G0382 LEV 3 HOSP TYPE B ED VISIT: HCPCS | Performed by: STUDENT IN AN ORGANIZED HEALTH CARE EDUCATION/TRAINING PROGRAM

## 2023-11-28 PROCEDURE — 81002 URINALYSIS NONAUTO W/O SCOPE: CPT | Performed by: STUDENT IN AN ORGANIZED HEALTH CARE EDUCATION/TRAINING PROGRAM

## 2023-11-28 PROCEDURE — 81025 URINE PREGNANCY TEST: CPT | Performed by: STUDENT IN AN ORGANIZED HEALTH CARE EDUCATION/TRAINING PROGRAM

## 2023-11-28 RX ORDER — METRONIDAZOLE 500 MG/1
500 TABLET ORAL EVERY 12 HOURS SCHEDULED
Qty: 14 TABLET | Refills: 0 | Status: CANCELLED | OUTPATIENT
Start: 2023-11-28 | End: 2023-12-05

## 2023-11-29 LAB
C TRACH DNA SPEC QL NAA+PROBE: NEGATIVE
N GONORRHOEA DNA SPEC QL NAA+PROBE: NEGATIVE
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
## 2023-11-30 LAB — BACTERIA UR CULT: NORMAL

## 2023-12-05 ENCOUNTER — TELEPHONE (OUTPATIENT)
Dept: OBGYN CLINIC | Facility: CLINIC | Age: 23
End: 2023-12-05

## 2023-12-05 NOTE — TELEPHONE ENCOUNTER
Patient went too urgent care for bv, was given cleocin because she is breast feeding and they wouldn't give her flagyl, she used the last tablet on Saturday and she is still having vaginal Pain, no odor, is wondering if it usually takes this long for bv to resolve, they also did std testing which was negative.

## 2024-01-05 ENCOUNTER — TELEPHONE (OUTPATIENT)
Dept: OBGYN CLINIC | Facility: CLINIC | Age: 24
End: 2024-01-05

## 2024-01-05 ENCOUNTER — TELEPHONE (OUTPATIENT)
Dept: GYNECOLOGY | Facility: CLINIC | Age: 24
End: 2024-01-05

## 2024-01-05 NOTE — TELEPHONE ENCOUNTER
Returned pt's p.c.- pt states she is approx 11 wks prenatal & had spotting yesterday, now is just noticing some pink tinged when wiping .   Reviewed vascularity of cervix during pregnancy & advised to monitor for now, abstain from IC for a few days, increase water intake, take PNV daily, reviewed parameters for wt. Lifting. Pt denies pelvic pain, did notice some cramping. Informed ,tylenol ok to take.  Has dating US appt scheduled for 1/10/24. Advised pt to call with any further questions/concerns.

## 2024-01-05 NOTE — TELEPHONE ENCOUNTER
Patient is calling because she is currently about 11 weeks Prenatal and starting last week was experiencing cramping.  Last night was spotting slightly, and this am she is spotting and saw some clotting.   Pt did state that her phone provider automatically  keeps blocked numbers from coming through her phone, and she is hoping not to miss call

## 2024-01-08 ENCOUNTER — TELEPHONE (OUTPATIENT)
Dept: OBGYN CLINIC | Facility: CLINIC | Age: 24
End: 2024-01-08

## 2024-01-08 DIAGNOSIS — O03.9 MISCARRIAGE: Primary | ICD-10-CM

## 2024-01-08 PROBLEM — O09.893 NONCOMPLIANT PREGNANT PATIENT IN THIRD TRIMESTER: Status: RESOLVED | Noted: 2022-11-09 | Resolved: 2024-01-08

## 2024-01-08 PROBLEM — O42.90 PROM (PREMATURE RUPTURE OF MEMBRANES): Status: RESOLVED | Noted: 2022-11-24 | Resolved: 2024-01-08

## 2024-01-08 PROBLEM — D25.9 UTERINE FIBROID IN PREGNANCY: Status: RESOLVED | Noted: 2022-06-17 | Resolved: 2024-01-08

## 2024-01-08 PROBLEM — O34.10 UTERINE FIBROID IN PREGNANCY: Status: RESOLVED | Noted: 2022-06-17 | Resolved: 2024-01-08

## 2024-01-08 PROBLEM — Z91.199 NONCOMPLIANT PREGNANT PATIENT IN THIRD TRIMESTER: Status: RESOLVED | Noted: 2022-11-09 | Resolved: 2024-01-08

## 2024-01-08 NOTE — TELEPHONE ENCOUNTER
Would be > 12 wks if LMP accurate. Though it looks like spotting started around 1/5/24.   Would recommend serial HCG's- today and then again wed to compare.  Blood type O-Pos  I would leave US appt as is for now - as we may still need that spot depending on what HCG level is

## 2024-01-08 NOTE — TELEPHONE ENCOUNTER
Patient called to cancel appointment for Wednesday [facility] patient stated she is having miscarriage . Patient has not talked to anyone yet . Left appointment on schedule in case it is needed.

## 2024-01-08 NOTE — TELEPHONE ENCOUNTER
Pt has an appt with you, Mirna, on 1/10 for  u/s.  lmp 10/15    Began spotting on Wed and Fri of last week. Over weekend - heavier like a period. Clots. Using about 6 pads /day  . Clots size of stawberry. She is not dizzy or lightheaded. I told her about a pad an hr - severe pain - go to ED. What f/u?  Thank you

## 2024-01-08 NOTE — TELEPHONE ENCOUNTER
Pt cannot go for hcg today as she is in [place] and will be home to go tomorrow AM.  She plans to be at her appt Wed. Will go for hcg on Thurs also.  Just fyi to you

## 2024-01-09 ENCOUNTER — LAB (OUTPATIENT)
Dept: LAB | Facility: HOSPITAL | Age: 24
End: 2024-01-09
Payer: COMMERCIAL

## 2024-01-09 DIAGNOSIS — O03.9 MISCARRIAGE: ICD-10-CM

## 2024-01-09 LAB — B-HCG SERPL-ACNC: 2611 MIU/ML (ref 0–5)

## 2024-01-09 PROCEDURE — 84702 CHORIONIC GONADOTROPIN TEST: CPT

## 2024-01-09 PROCEDURE — 36415 COLL VENOUS BLD VENIPUNCTURE: CPT

## 2024-01-10 ENCOUNTER — ULTRASOUND (OUTPATIENT)
Dept: OBGYN CLINIC | Facility: MEDICAL CENTER | Age: 24
End: 2024-01-10
Payer: COMMERCIAL

## 2024-01-10 ENCOUNTER — HOSPITAL ENCOUNTER (OUTPATIENT)
Dept: ULTRASOUND IMAGING | Facility: HOSPITAL | Age: 24
Discharge: HOME | End: 2024-01-10
Payer: COMMERCIAL

## 2024-01-10 ENCOUNTER — TELEPHONE (OUTPATIENT)
Dept: OBGYN CLINIC | Facility: CLINIC | Age: 24
End: 2024-01-10

## 2024-01-10 VITALS
WEIGHT: 142 LBS | BODY MASS INDEX: 23.66 KG/M2 | SYSTOLIC BLOOD PRESSURE: 112 MMHG | DIASTOLIC BLOOD PRESSURE: 74 MMHG | HEIGHT: 65 IN

## 2024-01-10 DIAGNOSIS — Z36.89 ESTABLISH GESTATIONAL AGE, ULTRASOUND: ICD-10-CM

## 2024-01-10 DIAGNOSIS — N94.89 ADNEXAL MASS: ICD-10-CM

## 2024-01-10 DIAGNOSIS — O20.9 VAGINAL BLEEDING AFFECTING EARLY PREGNANCY: ICD-10-CM

## 2024-01-10 DIAGNOSIS — Z11.3 SCREEN FOR STD (SEXUALLY TRANSMITTED DISEASE): ICD-10-CM

## 2024-01-10 DIAGNOSIS — O20.0 THREATENED ABORTION: ICD-10-CM

## 2024-01-10 DIAGNOSIS — O20.9 VAGINAL BLEEDING AFFECTING EARLY PREGNANCY: Primary | ICD-10-CM

## 2024-01-10 PROCEDURE — 87591 N.GONORRHOEAE DNA AMP PROB: CPT | Performed by: CLINICAL NURSE SPECIALIST

## 2024-01-10 PROCEDURE — 99213 OFFICE O/P EST LOW 20 MIN: CPT | Performed by: CLINICAL NURSE SPECIALIST

## 2024-01-10 PROCEDURE — 76815 OB US LIMITED FETUS(S): CPT

## 2024-01-10 PROCEDURE — 87491 CHLMYD TRACH DNA AMP PROBE: CPT | Performed by: CLINICAL NURSE SPECIALIST

## 2024-01-10 PROCEDURE — 76817 TRANSVAGINAL US OBSTETRIC: CPT | Performed by: CLINICAL NURSE SPECIALIST

## 2024-01-10 NOTE — PROGRESS NOTES
"   Subjective  Patient ID: Shyam Rizvi is a 23 y.o. female here for Pregnancy Ultrasound and Pregnancy Problem (Vaginal bldg)    Newly Pregnant  Patient's last menstrual period was 10/15/2023. giving her an RULA of 24 and a gestational age of  12 weeks 3days (based on LMP)  However she started having light bleeding 3-4 days ago.  Called in Friday. And HCG was ordered- 24-2,611, blood type Opos.  Rpt HCG planned for tomorrow.   Bdg became heavier over the weekend with clots and cramping.      + UPT nearing the end of november  Menstrual cycle: periods are very iregular  Pregnancy was unplanned/surpised.       OB History    Para Term  AB Living   3 1 0 1 0 1   SAB IAB Ectopic Multiple Live Births   0 0 0 0 1      # Outcome Date GA Lbr Mitchell/2nd Weight Sex Delivery Anes PTL Lv   3 Current            2  22 35w5d / 00:15 2495 g (5 lb 8 oz) M Vag-Spont None Y NIRU   1                  The following portions of the patient's history were reviewed and updated as appropriate: allergies, current medications, past family history, past medical history, past social history, past surgical history, and problem list.    Perinent hx that may affect pregnancy:  Hx of sickle cell trait  Priot PTD at 35w5d (PPROM)      Review of Systems    See HPI for pertinent positives.             /74 (BP Location: Left arm, Patient Position: Sitting, Cuff Size: Standard)   Ht 5' 5\" (1.651 m)   Wt 64.4 kg (142 lb)   LMP 10/15/2023   Breastfeeding No   BMI 23.63 kg/m²   Physical Exam  Constitutional:       General: She is not in acute distress.     Appearance: Normal appearance.   Genitourinary:      Urethral meatus normal.      No lesions in the vagina.      Genitourinary Comments: Cervix is closed      Right Labia: No rash, lesions or skin changes.     Left Labia: No lesions, skin changes or rash.     Vaginal bleeding (small bld clot in vagina. No brisk bldg noted. small amt) present.      No " vaginal discharge, erythema, tenderness or ulceration.        Right Adnexa: not tender, not full and no mass present.     Left Adnexa: not tender, not full and no mass present.     No cervical motion tenderness, discharge, friability or lesion.      Uterus is not enlarged or tender.      Bladder is not tender.       Pelvic exam was performed with patient in the lithotomy position.   Rectum:      No external hemorrhoid.   HENT:      Head: Normocephalic.   Cardiovascular:      Rate and Rhythm: Normal rate.   Pulmonary:      Effort: Pulmonary effort is normal.   Neurological:      Mental Status: She is alert and oriented to person, place, and time.   Psychiatric:         Mood and Affect: Mood normal.         Behavior: Behavior normal.   Vitals reviewed.       Results for orders placed or performed in visit on 24   hCG, quantitative   Result Value Ref Range    HCG, Quant 2,611 (H) 0 - 5 mIU/mL       FIRST TRIMESTER OBSTETRIC ULTRASOUND     Patient's last menstrual period was 10/15/2023.    INDICATION: Establish Gestational Age; Vaginal bldg in pregnancy       FINDINGS:  See imaging report for details   Additional Findings: heterogenic/complex mass/cystic structure identified behind/adjacent to posterior wall of uterus measuring 2.10 x2.34 cm in sagittal plane    FHR: n/a  Endometrium 0.79 cm    IMPRESSION:  Hx of very irregular menses  HCG  2,611  NO IUP  identified.  No GS/YS or FP.   Normal size uterus and Endometrial lining.   Ovaries not clearly identified T/V.  There is a heterogenic/complex mass/cystic structure identified behind/adjacent to posterior wall of uterus measuring 2.10 x2.34 cm in sagittal plane  Likely SAB, need to r/o Ectopic, Formal US ordered.     TREY Ceron   CENTER -[facility] [place] [address]  [facility] [address]  Dept: [phone]  Dept Fax: [phone]  Loc:  [phone]  [facility] Fax: [phone]  Ultrasound Probe Disinfection    A transvaginal ultrasound was performed.   Prior to use, disinfection was performed with High Level Disinfection Process (Spot[employer]).  Probe serial number: [id number] was used.                    Assessment/Plan:         1. Vaginal bleeding affecting early pregnancy  Assessment & Plan:  Pt here with a hx of very irregular menses  + UPT around .  LMP 10/15/23  Would be 12w3d by LMP  + bldg started 1 wk ago, Initially light, but heavier over the weekend.  Exam with only small bldg noted. Cervix is closed.   HCG  2,611  US today-NO IUP identified.  Normal size uterus and Endometrial lining.   Ovaries not clearly identified T/V.  There is a heterogenic/complex mass/cystic structure identified behind/adjacent to posterior wall of uterus measuring 2.10 x2.34 cm in sagittal plane  Likely SAB, need to r/o Ectopic, Formal US ordered- stat   Bldg and ectopic precautions reviewed.     Orders:  -     AMB US OB < 14 weeks single or first gestation level 1  -     US OB < 14 weeks with transvaginal; Future; Expected date: 01/10/2024    2. Establish gestational age, ultrasound  -     AMB US OB < 14 weeks single or first gestation level 1    3. Threatened   -     US OB < 14 weeks with transvaginal; Future; Expected date: 01/10/2024    4. Adnexal mass  Assessment & Plan:  Formal US ordered    Orders:  -     US OB < 14 weeks with transvaginal; Future; Expected date: 01/10/2024    5. Screen for STD (sexually transmitted disease)  -     Chlamydia/GC amplified DNA by PCR        Orders Placed This Encounter   Procedures    Chlamydia/GC amplified DNA by PCR    US OB < 14 weeks with transvaginal    AMB US OB < 14 weeks single or first gestation level 1

## 2024-01-10 NOTE — ASSESSMENT & PLAN NOTE
Pt here with a hx of very irregular menses  + UPT around 11/21.  LMP 10/15/23  Would be 12w3d by LMP  + bldg started 1 wk ago, Initially light, but heavier over the weekend.  Exam with only small bldg noted. Cervix is closed.   HCG 1/9 2,611  US today-NO IUP identified.  Normal size uterus and Endometrial lining.   Ovaries not clearly identified T/V.  There is a heterogenic/complex mass/cystic structure identified behind/adjacent to posterior wall of uterus measuring 2.10 x2.34 cm in sagittal plane  Likely SAB, need to r/o Ectopic, Formal US ordered- stat   Bldg and ectopic precautions reviewed.

## 2024-01-11 ENCOUNTER — LAB (OUTPATIENT)
Dept: LAB | Facility: CLINIC | Age: 24
End: 2024-01-11
Payer: COMMERCIAL

## 2024-01-11 DIAGNOSIS — O03.9 MISCARRIAGE: ICD-10-CM

## 2024-01-11 LAB
B-HCG SERPL-ACNC: 857 MIU/ML (ref 0–5)
C TRACH DNA SPEC QL NAA+PROBE: NEGATIVE
N GONORRHOEA DNA SPEC QL NAA+PROBE: NEGATIVE

## 2024-01-11 PROCEDURE — 36415 COLL VENOUS BLD VENIPUNCTURE: CPT

## 2024-01-11 PROCEDURE — 84702 CHORIONIC GONADOTROPIN TEST: CPT

## 2024-01-11 NOTE — RESULT ENCOUNTER NOTE
Reviewed results with pt.  Abnormal finding on office us is a pedunculated fibroid. No evidence of ectopic pregnancy.   Continue as planned with repeat HCG today or tomorrow. Will touch base with her after that. Today denies heavy bleeding or pain

## 2024-01-12 NOTE — RESULT ENCOUNTER NOTE
HCG is decreased significantly  Spoke to pt  and bleeding is getting much lighter.  This should resolve on its own and she should not require f/u other than HCG trending to neg.   If new heavy bldg or increased cramping /pelvic pain then should call back

## 2024-01-18 ENCOUNTER — TELEPHONE (OUTPATIENT)
Dept: OBGYN CLINIC | Facility: CLINIC | Age: 24
End: 2024-01-18

## 2024-01-18 ENCOUNTER — APPOINTMENT (OUTPATIENT)
Dept: LAB | Facility: HOSPITAL | Age: 24
End: 2024-01-18
Payer: COMMERCIAL

## 2024-01-18 DIAGNOSIS — O03.9 MISCARRIAGE: Primary | ICD-10-CM

## 2024-01-18 DIAGNOSIS — O03.9 MISCARRIAGE: ICD-10-CM

## 2024-01-18 LAB — B-HCG SERPL-ACNC: 69 MIU/ML (ref 0–5)

## 2024-01-18 PROCEDURE — 84702 CHORIONIC GONADOTROPIN TEST: CPT

## 2024-01-18 PROCEDURE — 36415 COLL VENOUS BLD VENIPUNCTURE: CPT

## 2024-01-18 NOTE — TELEPHONE ENCOUNTER
----- Message from TREY Amor sent at 1/18/2024  2:54 PM EST -----  HCG with nice drop in 1 wk.   Repeat again in 1 wk   Order placed. Please let her know

## 2024-01-22 ENCOUNTER — TELEPHONE (OUTPATIENT)
Dept: OBGYN CLINIC | Facility: CLINIC | Age: 24
End: 2024-01-22

## 2024-01-22 ENCOUNTER — APPOINTMENT (OUTPATIENT)
Dept: LAB | Facility: HOSPITAL | Age: 24
End: 2024-01-22
Payer: COMMERCIAL

## 2024-01-22 ENCOUNTER — NURSE TRIAGE (OUTPATIENT)
Dept: OBGYN CLINIC | Facility: CLINIC | Age: 24
End: 2024-01-22

## 2024-01-22 DIAGNOSIS — R30.0 DYSURIA: Primary | ICD-10-CM

## 2024-01-22 DIAGNOSIS — N89.8 VAGINAL ODOR: ICD-10-CM

## 2024-01-22 DIAGNOSIS — R30.0 DYSURIA: ICD-10-CM

## 2024-01-22 DIAGNOSIS — O03.9 MISCARRIAGE: ICD-10-CM

## 2024-01-22 PROBLEM — N94.89 ADNEXAL MASS: Status: RESOLVED | Noted: 2024-01-10 | Resolved: 2024-01-22

## 2024-01-22 PROBLEM — O20.9 VAGINAL BLEEDING AFFECTING EARLY PREGNANCY: Status: RESOLVED | Noted: 2024-01-10 | Resolved: 2024-01-22

## 2024-01-22 LAB
B-HCG SERPL-ACNC: 24 MIU/ML (ref 0–5)
BACTERIA UR QL AUTO: NORMAL /HPF
BILIRUB UR QL STRIP: NEGATIVE
CLARITY UR: CLEAR
COLOR UR: YELLOW
GLUCOSE UR STRIP-MCNC: NEGATIVE MG/DL
HGB UR QL STRIP.AUTO: NEGATIVE
KETONES UR STRIP-MCNC: NEGATIVE MG/DL
LEUKOCYTE ESTERASE UR QL STRIP: NEGATIVE
NITRITE UR QL STRIP: NEGATIVE
NON-SQ EPI CELLS URNS QL MICRO: NORMAL /HPF
PH UR STRIP.AUTO: 7 [PH]
PROT UR STRIP-MCNC: NEGATIVE MG/DL
RBC #/AREA URNS AUTO: NORMAL /HPF
SP GR UR STRIP.AUTO: 1.01 (ref 1–1.03)
UROBILINOGEN UR STRIP-ACNC: <2 MG/DL
WBC #/AREA URNS AUTO: NORMAL /HPF

## 2024-01-22 PROCEDURE — 87086 URINE CULTURE/COLONY COUNT: CPT

## 2024-01-22 PROCEDURE — 84702 CHORIONIC GONADOTROPIN TEST: CPT

## 2024-01-22 PROCEDURE — 36415 COLL VENOUS BLD VENIPUNCTURE: CPT

## 2024-01-22 PROCEDURE — 81001 URINALYSIS AUTO W/SCOPE: CPT

## 2024-01-22 RX ORDER — METRONIDAZOLE 500 MG/1
500 TABLET ORAL 2 TIMES DAILY
Qty: 14 TABLET | Refills: 0 | Status: SHIPPED | OUTPATIENT
Start: 2024-01-22 | End: 2024-01-29 | Stop reason: ALTCHOICE

## 2024-01-22 NOTE — TELEPHONE ENCOUNTER
Office visit would be ideal.  However, I am also agreeable to treat for BV over the phone since she has fishy odor. (Ordered)   Regarding dysuria- would need her to submit a clean catch urine (ordered) at the lab and then if abnormal- can treat
Patient left voice message on script line she has BV and a UTI  
Pt is miscarrying. She has me confused and I do not know what to order for her or oc ?  She said she has a fishy smell for 1 day - yesterday. She had sex 3 days ago and it was painful. (She is miscarrying and nmay be dry.)? only sx of urinary tract infection is burning on outside of urethra. Shall I just give her an ov?  Thx  
Pt wants rx sent to [facility] in [place]. I called [phone] and ordered flagyl Sameer, # 14, 1 bid x 7 . I ordered u/a and culture also  
[Negative] : Allergic/Immunologic

## 2024-01-23 LAB — BACTERIA UR CULT: NORMAL

## 2024-01-24 DIAGNOSIS — O03.9 MISCARRIAGE: Primary | ICD-10-CM

## 2024-01-29 ENCOUNTER — OFFICE VISIT (OUTPATIENT)
Dept: OBGYN CLINIC | Facility: CLINIC | Age: 24
End: 2024-01-29
Payer: COMMERCIAL

## 2024-01-29 ENCOUNTER — TELEPHONE (OUTPATIENT)
Dept: OBGYN CLINIC | Facility: CLINIC | Age: 24
End: 2024-01-29

## 2024-01-29 VITALS
SYSTOLIC BLOOD PRESSURE: 118 MMHG | BODY MASS INDEX: 23.99 KG/M2 | WEIGHT: 144 LBS | DIASTOLIC BLOOD PRESSURE: 80 MMHG | HEIGHT: 65 IN

## 2024-01-29 DIAGNOSIS — Z30.015 ENCOUNTER FOR INITIAL PRESCRIPTION OF VAGINAL RING HORMONAL CONTRACEPTIVE: ICD-10-CM

## 2024-01-29 DIAGNOSIS — N90.89 VULVAR LESION: Primary | ICD-10-CM

## 2024-01-29 DIAGNOSIS — B37.49 CANDIDA INFECTION OF GENITAL REGION: ICD-10-CM

## 2024-01-29 PROBLEM — U07.1 COVID-19: Status: RESOLVED | Noted: 2022-11-26 | Resolved: 2024-01-29

## 2024-01-29 PROBLEM — D57.3 SICKLE CELL TRAIT IN MOTHER AFFECTING PREGNANCY (HCC): Status: RESOLVED | Noted: 2022-06-14 | Resolved: 2024-01-29

## 2024-01-29 PROBLEM — K80.50 CHOLEDOCHOLITHIASIS: Status: ACTIVE | Noted: 2023-01-20

## 2024-01-29 PROBLEM — O99.019 SICKLE CELL TRAIT IN MOTHER AFFECTING PREGNANCY (HCC): Status: RESOLVED | Noted: 2022-06-14 | Resolved: 2024-01-29

## 2024-01-29 PROCEDURE — 87529 HSV DNA AMP PROBE: CPT | Performed by: NURSE PRACTITIONER

## 2024-01-29 PROCEDURE — 99213 OFFICE O/P EST LOW 20 MIN: CPT | Performed by: NURSE PRACTITIONER

## 2024-01-29 RX ORDER — ETONOGESTREL AND ETHINYL ESTRADIOL VAGINAL RING .015; .12 MG/D; MG/D
RING VAGINAL
Qty: 1 EACH | Refills: 1 | Status: SHIPPED | OUTPATIENT
Start: 2024-01-29

## 2024-01-29 RX ORDER — FLUCONAZOLE 150 MG/1
150 TABLET ORAL ONCE
Qty: 2 TABLET | Refills: 0 | Status: SHIPPED | OUTPATIENT
Start: 2024-01-29 | End: 2024-01-29

## 2024-01-29 NOTE — TELEPHONE ENCOUNTER
Gastroenterology Patient called complainting of painful vulvar/vaginal sores X 1 week. Patient states that  UTI or yeast infection symptoms started on 1/19/2024, she used  monistat 1 one day TX, symptoms got worse and now she has painful vaginal ulcers/sore X 3 days. Appointment made for today at 1:40 pm with Skye.

## 2024-01-29 NOTE — PATIENT INSTRUCTIONS
Etonogestrel/Ethinyl Estradiol (Into the vagina)   Ethinyl Estradiol (ETH-i-nil es-tra-DYE-ol), Etonogestrel (e-toe-jonny-ADAM-trel)  Prevents pregnancy.   Brand Name(s): EluRyng, Sunitha, NuvaRing   There may be other brand names for this medicine.  When This Medicine Should Not Be Used:   This medicine is not right for everyone. Do not use it if you had an allergic reaction to etonogestrel or ethinyl estradiol, if you are pregnant, or if you have vaginal bleeding that has not been checked by a doctor. Do not use it if you have liver disease or tumors, breast cancer, problems with blood clots, or certain heart problems.  How to Use This Medicine:   Insert  This medicine is in a ring that is put into your vagina. Your doctor or nurse will show you how to put in the ring. The ring should be left in place for 3 weeks. It will then be removed and another one will be inserted 1 week later. During the week without the ring, you will usually have your menstrual period.  The first time you start using the ring, you should also use a second form of birth control during the first 7 days to avoid pregnancy. Do not use a diaphragm, because the ring may affect how the diaphragm fits.  Read and follow the patient instructions that come with this medicine. Talk to your doctor or pharmacist if you have any questions.  Once the ring is in place, you should not be able to feel it. If you feel uncomfortable, the ring may not be inserted far enough. Gently push the ring farther into your vagina. If you feel pain, talk to your doctor.  Check for the presence of the ring inside your vagina regularly (including before and after having sex).  The ring may move down accidently. This can happen if you are having a bowel movement. Gently push the ring back into place. If the ring comes all the way out, rinse it with warm water and put it back in. Call your doctor if the ring comes out several times.  Missed dose:   If NuvaRing® has slipped out  and it has been out for less than 3 hours, rinse it in cool or lukewarm water and reinsert it. You should still be protected from pregnancy. If NuvaRing® has been out for more than 3 hours, insert a new ring. You must use an extra method of birth control until the NuvaRing® has been in place for 7 days in a row. Do not use a diaphragm.  If you leave the vaginal ring in place for more than 4 weeks, you may not be protected from pregnancy. Make sure that you are not pregnant before you insert a new ring. You must use an additional form of birth control (not a diaphragm) until the new ring has been in place for 7 days in a row.  If you forget to insert a new ring after the ring-free week, call your doctor for instructions.  Store this medicine at room temperature, away from heat and direct light for up to 4 months. Place the used vaginal ring in the re-sealable foil pouch and throw it in the trash. Do not flush the ring down the toilet.  Drugs and Foods to Avoid:   Ask your doctor or pharmacist before using any other medicine, including over-the-counter medicines, vitamins, and herbal products.  Do not use this medicine together with medicine to treat hepatitis C virus infection, including ombitasvir/paritaprevir/ritonavir, with or without dasabuvir.  Some foods and medicines can affect how etonogestrel/ethinyl estradiol works. Tell your doctor if you are using any of the following:  Acetaminophen, aprepitant, ascorbic acid, atorvastatin, bosentan, clofibric acid, cyclosporine, morphine, prednisolone, salicylic acid, Ellis's wort, temazepam, theophylline, tizanidine  Medicine for HIV/AIDS (including boceprevir, telaprevir)  Medicine to treat an infection (including fluconazole, griseofulvin, itraconazole, ketoconazole, miconazole, rifabutin, rifampicin, voriconazole)  Medicine to treat seizures (including carbamazepine, felbamate, lamotrigine, oxcarbazepine, phenobarbital, phenytoin, rufinamide, topiramate)  Thyroid  medicine  Do not eat grapefruit or drink grapefruit juice while you are using this medicine.  Ask your doctor before you use other products or medicines into your vagina. You may need to remove the ring first.  Warnings While Using This Medicine:   It is not safe to take this medicine during pregnancy. It could harm an unborn baby. Tell your doctor right away if you become pregnant.  Tell your doctor if you are breastfeeding, or if you recently had a baby, miscarriage, or . Tell your doctor if you have kidney disease, cervical cancer, diabetes, epilepsy, migraines, heart or blood vessel disease, high cholesterol, high blood pressure, or a history of depression or chloasma (skin discoloration on the face). Tell your doctor if you smoke or if you are having a surgery that requires inactivity for a long time.  This medicine may cause the following problems:  Increased risk of heart attack, stroke, or blood clots  Toxic shock syndrome  Liver problems  High blood pressure  Gallbladder disease  High cholesterol or fats in the blood  Increased risk of breast or cervical cancer  This medicine may cause skin discoloration. Use a sunscreen when you are outdoors. Avoid sunlamps and tanning beds.  This medicine will not protect you from HIV/AIDS or other sexually transmitted diseases.  You might have spotting or irregular bleeding when you first start using this medicine. You might have unplanned bleeding if you miss a dose or are late taking it. However, if you have heavy bleeding, call your doctor.  Tell any doctor or dentist who treats you that you are using this medicine. You may need to stop using this medicine several days before you have surgery or medical tests.  Tell any doctor or dentist who treats you that you are using this medicine. This medicine may affect certain medical test results.  Your doctor will check your progress and the effects of this medicine at regular visits. Keep all appointments.  Keep all  medicine out of the reach of children. Never share your medicine with anyone.  Possible Side Effects While Using This Medicine:   Call your doctor right away if you notice any of these side effects:  Allergic reaction: Itching or hives, swelling in your face or hands, swelling or tingling in your mouth or throat, chest tightness, trouble breathing  Blistering, peeling, red skin rash  Breast lumps, tenderness, pain, swelling, or discharge  Chest pain that may spread, trouble breathing, coughing up blood  Dark urine or pale stools, loss of appetite, yellow skin or eyes  Fast, slow, or pounding heartbeat  Numbness or weakness on one side of your body, pain in your lower leg, sudden or severe headache, problems with vision, speech, or walking  Redness, pain, itching, or burning sensation inside your vagina  Sudden and severe stomach pain, nausea, vomiting, lightheadedness  Sudden high fever, diarrhea, dizziness, fainting, muscle aches, sunburn-like rash  Unusual or unexpected vaginal bleeding or heavy bleeding  Vision loss, double vision  If you notice these less serious side effects, talk with your doctor:   Darkened skin on your face  Depression, mood changes  Headaches  If you notice other side effects that you think are caused by this medicine, tell your doctor.   Call your doctor for medical advice about side effects. You may report side effects to FDA at 7-159-FDA-7319  © Copyright [employer]ative 2023 Information is for End User's use only and may not be sold, redistributed or otherwise used for commercial purposes.  The above information is an  only. It is not intended as medical advice for individual conditions or treatments. Talk to your doctor, nurse or pharmacist before following any medical regimen to see if it is safe and effective for you.
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
## 2024-01-30 LAB
HSV1 DNA SPEC QL NAA+PROBE: DETECTED
HSV1 DNA SPEC QL NAA+PROBE: NOT DETECTED

## 2024-02-01 ENCOUNTER — APPOINTMENT (OUTPATIENT)
Dept: LAB | Facility: HOSPITAL | Age: 24
End: 2024-02-01
Payer: COMMERCIAL

## 2024-02-01 DIAGNOSIS — O03.9 MISCARRIAGE: ICD-10-CM

## 2024-02-01 LAB — B-HCG SERPL-ACNC: 5 MIU/ML (ref 0–5)

## 2024-02-01 PROCEDURE — 36415 COLL VENOUS BLD VENIPUNCTURE: CPT

## 2024-02-01 PROCEDURE — 84702 CHORIONIC GONADOTROPIN TEST: CPT

## 2024-04-01 ENCOUNTER — ANNUAL EXAM (OUTPATIENT)
Dept: OBGYN CLINIC | Facility: CLINIC | Age: 24
End: 2024-04-01
Payer: COMMERCIAL

## 2024-04-01 VITALS
DIASTOLIC BLOOD PRESSURE: 82 MMHG | BODY MASS INDEX: 24.32 KG/M2 | WEIGHT: 146 LBS | HEIGHT: 65 IN | SYSTOLIC BLOOD PRESSURE: 112 MMHG

## 2024-04-01 DIAGNOSIS — Z01.419 ENCOUNTER FOR GYNECOLOGICAL EXAMINATION WITHOUT ABNORMAL FINDING: Primary | ICD-10-CM

## 2024-04-01 DIAGNOSIS — Z11.3 SCREEN FOR STD (SEXUALLY TRANSMITTED DISEASE): ICD-10-CM

## 2024-04-01 PROCEDURE — 99395 PREV VISIT EST AGE 18-39: CPT | Performed by: NURSE PRACTITIONER
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
## 2024-06-21 ENCOUNTER — TELEPHONE (OUTPATIENT)
Age: 24
End: 2024-06-21

## 2024-06-21 NOTE — TELEPHONE ENCOUNTER
Patient called stating that she was just in on 4/1 for her annual with Amelia Bejarano. She stated that they discussed her going back on the pill from the ring. The patient does have her period. Patient stated that she was on the pill a while ago and wants to go back on that one, but I did not see anything on her medication list. Please contact patient and let her know if this is possible

## 2024-06-24 DIAGNOSIS — Z30.011 ENCOUNTER FOR INITIAL PRESCRIPTION OF CONTRACEPTIVE PILLS: Primary | ICD-10-CM

## 2024-06-24 RX ORDER — ACETAMINOPHEN AND CODEINE PHOSPHATE 120; 12 MG/5ML; MG/5ML
1 SOLUTION ORAL DAILY
Qty: 84 TABLET | Refills: 3 | Status: SHIPPED | OUTPATIENT
Start: 2024-06-24

## 2024-08-06 ENCOUNTER — OFFICE VISIT (OUTPATIENT)
Dept: FAMILY MEDICINE CLINIC | Facility: CLINIC | Age: 24
End: 2024-08-06
Payer: COMMERCIAL

## 2024-08-06 VITALS
TEMPERATURE: 97.8 F | OXYGEN SATURATION: 98 % | WEIGHT: 159 LBS | RESPIRATION RATE: 12 BRPM | DIASTOLIC BLOOD PRESSURE: 70 MMHG | HEART RATE: 88 BPM | BODY MASS INDEX: 26.49 KG/M2 | HEIGHT: 65 IN | SYSTOLIC BLOOD PRESSURE: 110 MMHG

## 2024-08-06 DIAGNOSIS — Z00.00 ANNUAL PHYSICAL EXAM: Primary | ICD-10-CM

## 2024-08-06 DIAGNOSIS — E55.9 HYPOVITAMINOSIS D: ICD-10-CM

## 2024-08-06 PROCEDURE — 99385 PREV VISIT NEW AGE 18-39: CPT | Performed by: NURSE PRACTITIONER

## 2024-08-06 NOTE — PROGRESS NOTES
Adult Annual Physical  Name: Shyam Rizvi      : 2000      MRN: [id number]  Encounter Provider: TREY Aguillon  Encounter Date: 2024   Encounter department: [facility]    Assessment & Plan   1. Annual physical exam  Assessment & Plan:  Annual physical completed.  Patient has complaints of headaches, constipation.  Discussed increasing fluid hydration and maintaining good nutrition continue with exercise.  Blood work ordered.  Did discuss with patient to keep a headache diary bring to next office visit.  Patient tested positive for depression, currently not suicidal.  Does have a therapist.  Discussed possible use of SSRI.  Discussed nonpharmacological interventions for management of anxiety and depression.  Patient is current with cervical cancer screening  Orders:  -     Comprehensive metabolic panel; Future  -     CBC and differential; Future  -     Lipid panel; Future  -     TSH, 3rd generation with Free T4 reflex; Future  2. Hypovitaminosis D  -     Vitamin D 25 hydroxy; Future  Immunizations and preventive care screenings were discussed with patient today. Appropriate education was printed on patient's after visit summary.    Counseling:  Alcohol/drug use: discussed moderation in alcohol intake, the recommendations for healthy alcohol use, and avoidance of illicit drug use.  Dental Health: discussed importance of regular tooth brushing, flossing, and dental visits.  Injury prevention: discussed safety/seat belts, safety helmets, smoke detectors, carbon dioxide detectors, and smoking near bedding or upholstery.  Sexual health: discussed sexually transmitted diseases, partner selection, use of condoms, avoidance of unintended pregnancy, and contraceptive alternatives.  Exercise: the importance of regular exercise/physical activity was discussed. Recommend exercise 3-5 times per week for at least 30 minutes.       Depression Screening and Follow-up Plan: Patient's  "depression screening was positive with a PHQ-9 score of 9. Continue regular follow-up with their mental health provider who is managing their mental health condition(s).         History of Present Illness     Adult Annual Physical:  Patient presents for annual physical.     Diet and Physical Activity:  - Diet/Nutrition: well balanced diet.  - Exercise: 3-4 times a week on average.    Depression Screening:    - PHQ-9 Score: 9    General Health:  - Sleep: 4-6 hours of sleep on average.  - Hearing: normal hearing bilateral ears.  - Vision: most recent eye exam > 1 year ago.  - Dental: regular dental visits and brushes teeth twice daily.    /GYN Health:  - Follows with GYN: no.     Review of Systems   Constitutional: Negative.    HENT: Negative.     Eyes: Negative.    Respiratory: Negative.     Cardiovascular: Negative.    Gastrointestinal:  Positive for constipation.   Endocrine: Negative.    Genitourinary: Negative.    Musculoskeletal: Negative.    Allergic/Immunologic: Negative.    Neurological:  Positive for headaches (front, last hours).   Psychiatric/Behavioral:  Positive for dysphoric mood.          Objective     /70   Pulse 88   Temp 97.8 °F (36.6 °C) (Temporal)   Resp 12   Ht 5' 5\" (1.651 m)   Wt 72.1 kg (159 lb)   LMP 07/02/2024   SpO2 98%   BMI 26.46 kg/m²   Depression Screening Follow-up Plan: Patient's depression screening was positive with a PHQ-2 score of . Their PHQ-9 score was 9. Patient assessed for underlying major depression. They have no active suicidal ideations. Brief counseling provided and recommend additional follow-up/re-evaluation next office visit.    "

## 2024-08-06 NOTE — ASSESSMENT & PLAN NOTE
Annual physical completed.  Patient has complaints of headaches, constipation.  Discussed increasing fluid hydration and maintaining good nutrition continue with exercise.  Blood work ordered.  Did discuss with patient to keep a headache diary bring to next office visit.  Patient tested positive for depression, currently not suicidal.  Does have a therapist.  Discussed possible use of SSRI.  Discussed nonpharmacological interventions for management of anxiety and depression.  Patient is current with cervical cancer screening

## 2024-08-06 NOTE — PATIENT INSTRUCTIONS
"Patient Education     Routine physical for adults   Obtain fasting labs, nothing to eat after 8pm , may drink water.    Keep headache diary, bring to next office visit    The Basics   Written by the doctors and editors at [facility]   What is a physical? -- A physical is a routine visit, or \"check-up,\" with your doctor. You might also hear it called a \"wellness visit\" or \"preventive visit.\"  During each visit, the doctor will:   Ask about your physical and mental health   Ask about your habits, behaviors, and lifestyle   Do an exam   Give you vaccines if needed   Talk to you about any medicines you take   Give advice about your health   Answer your questions  Getting regular check-ups is an important part of taking care of your health. It can help your doctor find and treat any problems you have. But it's also important for preventing health problems.  A routine physical is different from a \"sick visit.\" A sick visit is when you see a doctor because of a health concern or problem. Since physicals are scheduled ahead of time, you can think about what you want to ask the doctor.  How often should I get a physical? -- It depends on your age and health. In general, for people age 21 years and older:   If you are younger than 50 years, you might be able to get a physical every 3 years.   If you are 50 years or older, your doctor might recommend a physical every year.  If you have an ongoing health condition, like diabetes or high blood pressure, your doctor will probably want to see you more often.  What happens during a physical? -- In general, each visit will include:   Physical exam - The doctor or nurse will check your height, weight, heart rate, and blood pressure. They will also look at your eyes and ears. They will ask about how you are feeling and whether you have any symptoms that bother you.   Medicines - It's a good idea to bring a list of all the medicines you take to each doctor visit. Your doctor will talk to " "you about your medicines and answer any questions. Tell them if you are having any side effects that bother you. You should also tell them if you are having trouble paying for any of your medicines.   Habits and behaviors - This includes:   Your diet   Your exercise habits   Whether you smoke, drink alcohol, or use drugs   Whether you are sexually active   Whether you feel safe at home  Your doctor will talk to you about things you can do to improve your health and lower your risk of health problems. They will also offer help and support. For example, if you want to quit smoking, they can give you advice and might prescribe medicines. If you want to improve your diet or get more physical activity, they can help you with this, too.   Lab tests, if needed - The tests you get will depend on your age and situation. For example, your doctor might want to check your:   Cholesterol   Blood sugar   Iron level   Vaccines - The recommended vaccines will depend on your age, health, and what vaccines you already had. Vaccines are very important because they can prevent certain serious or deadly infections.   Discussion of screening - \"Screening\" means checking for diseases or other health problems before they cause symptoms. Your doctor can recommend screening based on your age, risk, and preferences. This might include tests to check for:   Cancer, such as breast, prostate, cervical, ovarian, colorectal, prostate, lung, or skin cancer   Sexually transmitted infections, such as chlamydia and gonorrhea   Mental health conditions like depression and anxiety  Your doctor will talk to you about the different types of screening tests. They can help you decide which screenings to have. They can also explain what the results might mean.   Answering questions - The physical is a good time to ask the doctor or nurse questions about your health. If needed, they can refer you to other doctors or specialists, too.  Adults older than 65 " years often need other care, too. As you get older, your doctor will talk to you about:   How to prevent falling at home   Hearing or vision tests   Memory testing   How to take your medicines safely   Making sure that you have the help and support you need at home  All topics are updated as new evidence becomes available and our peer review process is complete.  This topic retrieved from [employer] on: May 02, 2024.  Topic 176452 Version 1.0  Release: 32.4.3 - C32.122  © 2024 UpToDate, Inc. and/or its affiliates. All rights reserved.  Consumer Information Use and Disclaimer   Disclaimer: This generalized information is a limited summary of diagnosis, treatment, and/or medication information. It is not meant to be comprehensive and should be used as a tool to help the user understand and/or assess potential diagnostic and treatment options. It does NOT include all information about conditions, treatments, medications, side effects, or risks that may apply to a specific patient. It is not intended to be medical advice or a substitute for the medical advice, diagnosis, or treatment of a health care provider based on the health care provider's examination and assessment of a patient's specific and unique circumstances. Patients must speak with a health care provider for complete information about their health, medical questions, and treatment options, including any risks or benefits regarding use of medications. This information does not endorse any treatments or medications as safe, effective, or approved for treating a specific patient. UpToDate, Inc. and its affiliates disclaim any warranty or liability relating to this information or the use thereof.The use of this information is governed by the Terms of Use, available at https://www.wolterskluwer.com/en/know/clinical-effectiveness-terms. 2024© UpToDate, Inc. and its affiliates and/or licensors. All rights reserved.  Copyright   © 2024 UpToDate, Inc. and/or its  affiliates. All rights reserved.

## 2024-08-07 ENCOUNTER — APPOINTMENT (OUTPATIENT)
Dept: LAB | Facility: CLINIC | Age: 24
End: 2024-08-07
Payer: COMMERCIAL

## 2024-08-07 DIAGNOSIS — E55.9 HYPOVITAMINOSIS D: ICD-10-CM

## 2024-08-07 DIAGNOSIS — Z00.00 ANNUAL PHYSICAL EXAM: ICD-10-CM

## 2024-08-07 LAB
25(OH)D3 SERPL-MCNC: 30.4 NG/ML (ref 30–100)
ALBUMIN SERPL BCG-MCNC: 4.2 G/DL (ref 3.5–5)
ALP SERPL-CCNC: 50 U/L (ref 34–104)
ALT SERPL W P-5'-P-CCNC: 13 U/L (ref 7–52)
ANION GAP SERPL CALCULATED.3IONS-SCNC: 9 MMOL/L (ref 4–13)
AST SERPL W P-5'-P-CCNC: 18 U/L (ref 13–39)
BASOPHILS # BLD AUTO: 0.05 THOUSANDS/ÂΜL (ref 0–0.1)
BASOPHILS NFR BLD AUTO: 1 % (ref 0–1)
BILIRUB SERPL-MCNC: 0.56 MG/DL (ref 0.2–1)
BUN SERPL-MCNC: 11 MG/DL (ref 5–25)
CALCIUM SERPL-MCNC: 9.8 MG/DL (ref 8.4–10.2)
CHLORIDE SERPL-SCNC: 101 MMOL/L (ref 96–108)
CHOLEST SERPL-MCNC: 198 MG/DL
CO2 SERPL-SCNC: 28 MMOL/L (ref 21–32)
CREAT SERPL-MCNC: 0.67 MG/DL (ref 0.6–1.3)
EOSINOPHIL # BLD AUTO: 0.17 THOUSAND/ÂΜL (ref 0–0.61)
EOSINOPHIL NFR BLD AUTO: 3 % (ref 0–6)
ERYTHROCYTE [DISTWIDTH] IN BLOOD BY AUTOMATED COUNT: 13.2 % (ref 11.6–15.1)
GFR SERPL CREATININE-BSD FRML MDRD: 123 ML/MIN/1.73SQ M
GLUCOSE P FAST SERPL-MCNC: 75 MG/DL (ref 65–99)
HCT VFR BLD AUTO: 38.5 % (ref 34.8–46.1)
HDLC SERPL-MCNC: 105 MG/DL
HGB BLD-MCNC: 11.9 G/DL (ref 11.5–15.4)
IMM GRANULOCYTES # BLD AUTO: 0.01 THOUSAND/UL (ref 0–0.2)
IMM GRANULOCYTES NFR BLD AUTO: 0 % (ref 0–2)
LDLC SERPL CALC-MCNC: 85 MG/DL (ref 0–100)
LYMPHOCYTES # BLD AUTO: 1.56 THOUSANDS/ÂΜL (ref 0.6–4.47)
LYMPHOCYTES NFR BLD AUTO: 27 % (ref 14–44)
MCH RBC QN AUTO: 26.5 PG (ref 26.8–34.3)
MCHC RBC AUTO-ENTMCNC: 30.9 G/DL (ref 31.4–37.4)
MCV RBC AUTO: 86 FL (ref 82–98)
MONOCYTES # BLD AUTO: 0.46 THOUSAND/ÂΜL (ref 0.17–1.22)
MONOCYTES NFR BLD AUTO: 8 % (ref 4–12)
NEUTROPHILS # BLD AUTO: 3.55 THOUSANDS/ÂΜL (ref 1.85–7.62)
NEUTS SEG NFR BLD AUTO: 61 % (ref 43–75)
NONHDLC SERPL-MCNC: 93 MG/DL
NRBC BLD AUTO-RTO: 0 /100 WBCS
PLATELET # BLD AUTO: 307 THOUSANDS/UL (ref 149–390)
PMV BLD AUTO: 11 FL (ref 8.9–12.7)
POTASSIUM SERPL-SCNC: 4.4 MMOL/L (ref 3.5–5.3)
PROT SERPL-MCNC: 7.7 G/DL (ref 6.4–8.4)
RBC # BLD AUTO: 4.49 MILLION/UL (ref 3.81–5.12)
SODIUM SERPL-SCNC: 138 MMOL/L (ref 135–147)
TRIGL SERPL-MCNC: 39 MG/DL
TSH SERPL DL<=0.05 MIU/L-ACNC: 0.93 UIU/ML (ref 0.45–4.5)
WBC # BLD AUTO: 5.8 THOUSAND/UL (ref 4.31–10.16)

## 2024-08-07 PROCEDURE — 80061 LIPID PANEL: CPT

## 2024-08-07 PROCEDURE — 84443 ASSAY THYROID STIM HORMONE: CPT

## 2024-08-07 PROCEDURE — 85025 COMPLETE CBC W/AUTO DIFF WBC: CPT

## 2024-08-07 PROCEDURE — 82306 VITAMIN D 25 HYDROXY: CPT

## 2024-08-07 PROCEDURE — 36415 COLL VENOUS BLD VENIPUNCTURE: CPT

## 2024-08-07 PROCEDURE — 80053 COMPREHEN METABOLIC PANEL: CPT

## 2024-08-29 ENCOUNTER — OFFICE VISIT (OUTPATIENT)
Dept: FAMILY MEDICINE CLINIC | Facility: CLINIC | Age: 24
End: 2024-08-29
Payer: COMMERCIAL

## 2024-08-29 VITALS
HEIGHT: 65 IN | DIASTOLIC BLOOD PRESSURE: 68 MMHG | OXYGEN SATURATION: 99 % | TEMPERATURE: 98.1 F | RESPIRATION RATE: 14 BRPM | HEART RATE: 87 BPM | BODY MASS INDEX: 27.16 KG/M2 | WEIGHT: 163 LBS | SYSTOLIC BLOOD PRESSURE: 98 MMHG

## 2024-08-29 DIAGNOSIS — F32.9 REACTIVE DEPRESSION: Primary | ICD-10-CM

## 2024-08-29 DIAGNOSIS — Z30.011 ENCOUNTER FOR INITIAL PRESCRIPTION OF CONTRACEPTIVE PILLS: ICD-10-CM

## 2024-08-29 LAB — SL AMB POCT URINE HCG: NORMAL

## 2024-08-29 PROCEDURE — 81025 URINE PREGNANCY TEST: CPT | Performed by: NURSE PRACTITIONER

## 2024-08-29 PROCEDURE — 99213 OFFICE O/P EST LOW 20 MIN: CPT | Performed by: NURSE PRACTITIONER

## 2024-08-29 RX ORDER — ACETAMINOPHEN AND CODEINE PHOSPHATE 120; 12 MG/5ML; MG/5ML
1 SOLUTION ORAL DAILY
Qty: 84 TABLET | Refills: 3 | Status: SHIPPED | OUTPATIENT
Start: 2024-08-29

## 2024-08-29 NOTE — PATIENT INSTRUCTIONS
Take half a pill of Zoloft for the first week then increase to 1 dose  Take Tylenol for headache May use merline tea or mint tea for nausea  Patient Education     Depression, Adult ED   General Information   You came to the Emergency Department (ED) for depression. This is different than normal sadness. Depression can make it hard for you to work, sleep, and enjoy life. Signs of depression include:  No longer enjoying or caring about doing the things you used to.  Feeling sad, down, hopeless, or cranky most of the day, almost every day.  Losing or gaining weight without trying to do so.  Sleeping too much or too little.  Feeling tired or like you have no energy.  Feeling guilty or like you are worth nothing.  Forgetting things or feeling confused.  Moving and speaking more slowly than usual.  Acting restless or having trouble staying still.  Depression is caused by chemicals in your brain being out of balance. Treatment for depression may take a little while to start working.  What care is needed at home?   Call your regular doctor to let them know you were in the ED. Make a follow-up appointment if you were told to.  Go to counseling or support groups as suggested by your doctor.  Avoid beer, wine, and mixed drinks. Also avoid marijuana and illegal drugs.  Speak with trusted family and friends about your depression and how they can help.  Exercise each day. Try to spend time outside each day. Sunshine may make you feel better.  Have a regular sleep pattern and try to get 6 to 8 hours of sleep each night.  Learn how to cope with stress. Guided imagery, yoga, autumn chi, or deep breathing may help reduce your signs.  When do I need to get emergency help?   If you are thinking about hurting or killing yourself or someone else, help is available:  In the [place], call or text 304 to reach the Suicide & Crisis Lifeline.  In [place], call [phone].  Call your doctor or nurse and tell them it is urgent.  Call for an  ambulance (in the [place] and [place], call 911).  Go to the Emergency department at the nearest hospital.  When do I need to call the doctor?   Your depression does not get better within 1 or 2 weeks.  Your depression is getting worse.  Your family and friends say they are worried about you.  You continue to have problems eating or sleeping.  You are functioning poorly at work, at home, or in school  You have new or worsening symptoms.  Last Reviewed Date   2022-07-19  Consumer Information Use and Disclaimer   This generalized information is a limited summary of diagnosis, treatment, and/or medication information. It is not meant to be comprehensive and should be used as a tool to help the user understand and/or assess potential diagnostic and treatment options. It does NOT include all information about conditions, treatments, medications, side effects, or risks that may apply to a specific patient. It is not intended to be medical advice or a substitute for the medical advice, diagnosis, or treatment of a health care provider based on the health care provider's examination and assessment of a patient’s specific and unique circumstances. Patients must speak with a health care provider for complete information about their health, medical questions, and treatment options, including any risks or benefits regarding use of medications. This information does not endorse any treatments or medications as safe, effective, or approved for treating a specific patient. UpToDate, Inc. and its affiliates disclaim any warranty or liability relating to this information or the use thereof. The use of this information is governed by the Terms of Use, available at https://www.[employer].com/en/know/clinical-effectiveness-terms   Copyright   Copyright © 2024 UpToDate, Inc. and its affiliates and/or licensors. All rights reserved.

## 2024-08-29 NOTE — PROGRESS NOTES
"Ambulatory Visit  Name: Shyam Rizvi      : 2000      MRN: [id number]  Encounter Provider: TREY Aguillon  Encounter Date: 2024   Encounter department: [facility]    Assessment & Plan   1. Reactive depression  Assessment & Plan:  Patient reports having some depression, anxiety.  Is currently in a slow Strohlein relationship with partner.  Denies any physical abuse.  Currently is living at home with her parents has a 2-year-old son.  Denies being suicidal.  Patient is working.  Discussed nonpharmacological intervention for management of depression.  Discussed self-care.  Will start Zoloft 25 mg for 1 week then increase to 50 mg.  Orders:  -     sertraline (Zoloft) 50 mg tablet; Take 1 tablet (50 mg total) by mouth daily  2. Encounter for initial prescription of contraceptive pills  Assessment & Plan:  Pregnancy test negative in office.  Birth control refilled  Orders:  -     POCT urine HCG  -     norethindrone (MICRONOR) 0.35 MG tablet; Take 1 tablet (0.35 mg total) by mouth daily     History of Present Illness     Patient is here for follow-up of anxiety and depression.        Review of Systems   Constitutional: Negative.    HENT: Negative.     Eyes: Negative.    Respiratory: Negative.     Cardiovascular: Negative.    Gastrointestinal: Negative.    Endocrine: Negative.    Genitourinary: Negative.    Musculoskeletal: Negative.    Skin: Negative.    Allergic/Immunologic: Negative.    Neurological: Negative.    Psychiatric/Behavioral:  Negative for suicidal ideas. The patient is nervous/anxious.        Objective     BP 98/68   Pulse 87   Temp 98.1 °F (36.7 °C) (Temporal)   Resp 14   Ht 5' 5\" (1.651 m)   Wt 73.9 kg (163 lb)   LMP 2024 (Approximate)   SpO2 99%   BMI 27.12 kg/m²     Physical Exam  Constitutional:       General: She is not in acute distress.     Appearance: Normal appearance. She is not ill-appearing.   HENT:      Head: Normocephalic and " atraumatic.   Cardiovascular:      Rate and Rhythm: Normal rate and regular rhythm.      Pulses: Normal pulses.      Heart sounds: Normal heart sounds.   Pulmonary:      Effort: Pulmonary effort is normal. No respiratory distress.      Breath sounds: Normal breath sounds.   Chest:      Chest wall: No tenderness.   Abdominal:      General: Abdomen is flat. Bowel sounds are normal. There is no distension.      Palpations: There is no mass.      Tenderness: There is no abdominal tenderness.   Musculoskeletal:         General: Normal range of motion.      Cervical back: Normal range of motion and neck supple.   Skin:     General: Skin is warm and dry.   Neurological:      General: No focal deficit present.      Mental Status: She is alert and oriented to person, place, and time.   Psychiatric:         Mood and Affect: Mood normal.         Behavior: Behavior normal.         Thought Content: Thought content normal.         Judgment: Judgment normal.       Administrative Statements

## 2024-08-29 NOTE — ASSESSMENT & PLAN NOTE
Patient reports having some depression, anxiety.  Is currently in a slow Strohlein relationship with partner.  Denies any physical abuse.  Currently is living at home with her parents has a 2-year-old son.  Denies being suicidal.  Patient is working.  Discussed nonpharmacological intervention for management of depression.  Discussed self-care.  Will start Zoloft 25 mg for 1 week then increase to 50 mg.

## 2024-09-25 ENCOUNTER — TELEPHONE (OUTPATIENT)
Age: 24
End: 2024-09-25

## 2024-09-25 NOTE — TELEPHONE ENCOUNTER
Patient would like to know if Padmini Trinidad can give her a call? She just wanted to touch base with her about the sertraline (Zoloft) 50 mg tablet she is taking. Please advise.     Shyam: [phone]

## 2024-10-15 ENCOUNTER — OFFICE VISIT (OUTPATIENT)
Dept: FAMILY MEDICINE CLINIC | Facility: CLINIC | Age: 24
End: 2024-10-15
Payer: COMMERCIAL

## 2024-10-15 VITALS
BODY MASS INDEX: 27.49 KG/M2 | WEIGHT: 165 LBS | RESPIRATION RATE: 14 BRPM | OXYGEN SATURATION: 99 % | HEIGHT: 65 IN | HEART RATE: 89 BPM | SYSTOLIC BLOOD PRESSURE: 90 MMHG | DIASTOLIC BLOOD PRESSURE: 70 MMHG | TEMPERATURE: 97.9 F

## 2024-10-15 DIAGNOSIS — F32.9 REACTIVE DEPRESSION: ICD-10-CM

## 2024-10-15 PROCEDURE — 99213 OFFICE O/P EST LOW 20 MIN: CPT | Performed by: NURSE PRACTITIONER

## 2024-10-15 NOTE — ASSESSMENT & PLAN NOTE
Continue new 50 mg of Zoloft.  Discussed self-care.  Unable to get therapist discussed tadeo better help online therapy.  Continue with work, discussed further education, enrolled in classes.  Continue with exercise, good nutrition maintain sleep  Orders:    sertraline (Zoloft) 50 mg tablet; Take 1 tablet (50 mg total) by mouth daily

## 2024-10-15 NOTE — PROGRESS NOTES
"Ambulatory Visit  Name: Shyam Rizvi      : 2000      MRN: [id number]  Encounter Provider: TREY Aguillon  Encounter Date: 10/15/2024   Encounter department: [facility]    Assessment & Plan  Reactive depression  Continue new 50 mg of Zoloft.  Discussed self-care.  Unable to get therapist discussed tadeo better help online therapy.  Continue with work, discussed further education, enrolled in classes.  Continue with exercise, good nutrition maintain sleep  Orders:    sertraline (Zoloft) 50 mg tablet; Take 1 tablet (50 mg total) by mouth daily       History of Present Illness     Patient is here for follow-up on anxiety.  Has been taking 50 mg of Zoloft.  Reports some improvement          Review of Systems   Constitutional: Negative.    HENT: Negative.     Eyes: Negative.    Respiratory: Negative.     Cardiovascular: Negative.    Gastrointestinal: Negative.    Endocrine: Negative.    Genitourinary: Negative.    Musculoskeletal: Negative.    Skin: Negative.    Allergic/Immunologic: Negative.    Neurological: Negative.    Psychiatric/Behavioral:  Positive for dysphoric mood and sleep disturbance.            Objective     BP 90/70   Pulse 89   Temp 97.9 °F (36.6 °C) (Temporal)   Resp 14   Ht 5' 5\" (1.651 m)   Wt 74.8 kg (165 lb)   SpO2 99%   BMI 27.46 kg/m²     Physical Exam  Constitutional:       General: She is not in acute distress.     Appearance: Normal appearance. She is not ill-appearing.   HENT:      Head: Normocephalic and atraumatic.   Cardiovascular:      Rate and Rhythm: Normal rate and regular rhythm.      Pulses: Normal pulses.   Pulmonary:      Effort: Pulmonary effort is normal. No respiratory distress.      Breath sounds: Normal breath sounds.   Chest:      Chest wall: No tenderness.   Abdominal:      General: Abdomen is flat. Bowel sounds are normal. There is no distension.      Palpations: There is no mass.      Tenderness: There is no abdominal " tenderness.   Musculoskeletal:         General: Normal range of motion.      Cervical back: Normal range of motion and neck supple.   Skin:     General: Skin is warm and dry.   Neurological:      General: No focal deficit present.      Mental Status: She is alert and oriented to person, place, and time.   Psychiatric:         Mood and Affect: Mood normal.         Behavior: Behavior normal.         Thought Content: Thought content normal.         Judgment: Judgment normal.

## 2024-11-01 ENCOUNTER — TELEPHONE (OUTPATIENT)
Age: 24
End: 2024-11-01

## 2024-11-01 NOTE — TELEPHONE ENCOUNTER
Positive home pregnancy test yesterday, unsure of LMP. Patient states was prescribed BCP at [facility],  she has been taking BCP started end of Sept, skipped placebo weeks. Having pregnancy symptoms of nausea, breast tenderness and fatigue. Discussed appointment for 30 minute US with D&V scan scheduling. Unable to schedule within time needed, attempted to warm transfer call to Maryann in office. Advised to tell patient office will reach out for scheduling.   Patient verbalzied understanding

## 2024-11-05 NOTE — TELEPHONE ENCOUNTER
Patient called to follow-up on dating & viability appt. Pt prefers [place] but open to other office locations. First appt currently is 1/6 at .     Advised pt the office will reach out per Maryann's recommendation after reviewing schedule/ cancellations.  Please see below triage note.

## 2024-11-11 ENCOUNTER — TELEPHONE (OUTPATIENT)
Age: 24
End: 2024-11-11

## 2024-11-11 NOTE — TELEPHONE ENCOUNTER
"Patient reports pos pregnancy test 10/31, LMP unknown. Patient on wait list for D&V per other encounter. Patient states she has hx of miscarriage in March 2024, but not documented in chart because pregnancy was never confirmed. Patient asking if this pregnancy can be confirmed through beta hcg level. RN advised to call with any severe abdominal pain or vaginal bleeding.     Patient also states she had gallstones with previous pregnancy. Patient states she is getting same symptoms of gall stones again, such as occasional sharp stabbing pain in chest and back that feels like \"exaggerated heartburn.\" Patient denies chest pain at this time. Patient states she is taking digestive enzymes because it helped her symptoms last time. Patient asking if safe to continue taking digestive enzymes in pregnancy. RN advised she should go to ER if chest pain returns. Patient verbalized understanding.     Routing to provider for recommendation.    "

## 2024-11-12 DIAGNOSIS — N91.2 AMENORRHEA: Primary | ICD-10-CM

## 2024-11-14 ENCOUNTER — APPOINTMENT (OUTPATIENT)
Dept: LAB | Facility: CLINIC | Age: 24
End: 2024-11-14
Payer: COMMERCIAL

## 2024-11-14 DIAGNOSIS — N91.2 AMENORRHEA: ICD-10-CM

## 2024-11-14 LAB — B-HCG SERPL-ACNC: ABNORMAL MIU/ML (ref 0–5)

## 2024-11-14 PROCEDURE — 36415 COLL VENOUS BLD VENIPUNCTURE: CPT

## 2024-11-14 PROCEDURE — 84702 CHORIONIC GONADOTROPIN TEST: CPT

## 2024-11-15 ENCOUNTER — RESULTS FOLLOW-UP (OUTPATIENT)
Dept: OBGYN CLINIC | Facility: MEDICAL CENTER | Age: 24
End: 2024-11-15

## 2024-11-15 NOTE — RESULT ENCOUNTER NOTE
Covering gabriela's inbasket   HCG positive as noted  OK for first available US appt. She is likely at least 6 wks pregnant.  But could be further. No need to repeat value

## 2024-11-15 NOTE — TELEPHONE ENCOUNTER
----- Message from Zhane YOUNG sent at 11/15/2024  2:58 PM EST -----    ----- Message -----  From: TREY Amor  Sent: 11/15/2024   2:55 PM EST  To: Ob Navigkylah Jenkins    Covering gabriela's inbasket   HCG positive as noted  OK for first available US appt. She is likely at least 6 wks pregnant.  But could be further. No need to repeat value

## 2024-11-27 ENCOUNTER — TELEPHONE (OUTPATIENT)
Age: 24
End: 2024-11-27

## 2024-11-27 ENCOUNTER — OFFICE VISIT (OUTPATIENT)
Dept: URGENT CARE | Facility: CLINIC | Age: 24
End: 2024-11-27
Payer: COMMERCIAL

## 2024-11-27 VITALS
DIASTOLIC BLOOD PRESSURE: 80 MMHG | WEIGHT: 159.8 LBS | TEMPERATURE: 97 F | HEART RATE: 93 BPM | OXYGEN SATURATION: 100 % | BODY MASS INDEX: 26.59 KG/M2 | RESPIRATION RATE: 19 BRPM | SYSTOLIC BLOOD PRESSURE: 106 MMHG

## 2024-11-27 DIAGNOSIS — J06.9 ACUTE URI: Primary | ICD-10-CM

## 2024-11-27 DIAGNOSIS — R39.9 UTI SYMPTOMS: ICD-10-CM

## 2024-11-27 LAB
SARS-COV-2 AG UPPER RESP QL IA: POSITIVE
SL AMB  POCT GLUCOSE, UA: NEGATIVE
SL AMB LEUKOCYTE ESTERASE,UA: NEGATIVE
SL AMB POCT BILIRUBIN,UA: NEGATIVE
SL AMB POCT BLOOD,UA: NEGATIVE
SL AMB POCT CLARITY,UA: NORMAL
SL AMB POCT COLOR,UA: NORMAL
SL AMB POCT KETONES,UA: 80
SL AMB POCT NITRITE,UA: NEGATIVE
SL AMB POCT PH,UA: 6.5
SL AMB POCT SPECIFIC GRAVITY,UA: 1.02
SL AMB POCT URINE PROTEIN: NORMAL
SL AMB POCT UROBILINOGEN: 0.2
VALID CONTROL: ABNORMAL

## 2024-11-27 PROCEDURE — 87811 SARS-COV-2 COVID19 W/OPTIC: CPT | Performed by: NURSE PRACTITIONER

## 2024-11-27 PROCEDURE — 81002 URINALYSIS NONAUTO W/O SCOPE: CPT | Performed by: NURSE PRACTITIONER

## 2024-11-27 PROCEDURE — G0383 LEV 4 HOSP TYPE B ED VISIT: HCPCS | Performed by: NURSE PRACTITIONER

## 2024-11-27 PROCEDURE — 87086 URINE CULTURE/COLONY COUNT: CPT | Performed by: NURSE PRACTITIONER

## 2024-11-27 RX ORDER — ALBUTEROL SULFATE 90 UG/1
2 INHALANT RESPIRATORY (INHALATION) EVERY 6 HOURS PRN
Qty: 18 G | Refills: 0 | Status: SHIPPED | OUTPATIENT
Start: 2024-11-27

## 2024-11-27 NOTE — LETTER
November 27, 2024     Patient: Shyam Rizvi   YOB: 2000   Date of Visit: 11/27/2024       To Whom it May Concern:    Shyam Rizvi was seen in my clinic on 11/27/2024. She may return to work on 12/2/2024 .    If you have any questions or concerns, please don't hesitate to call.         Sincerely,          TREY Hernandez        CC: No Recipients
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
## 2024-11-27 NOTE — PATIENT INSTRUCTIONS
Cold and flu  If you catch a cold or come down flu-like ick during your pregnancy, think about managing the symptoms one by one. That means that rather than an all-in-one “multi-symptom” or “cold and flu” remedy, choose medications that target your specific symptoms, like a cough or body aches.  Avoid cold and flu products that use words like “extended release,” “24-hour” or have a “D” after the brand name (such as Mucinex D®). And some night-time-specific formulas, such as Nyquil®, should be avoided due to their high alcohol content.  Avoid decongestants like pseudoephedrine (Sudafed®) in your first trimester, as they may raise the risk of birth defects.  Instead, try these pregnancy-safe cold and flu medicines:  Diphenhydramine (Benadryl®).  Dextromethorphan (Robitussin®).  Guaifenesin (Mucinex®).  Pseudoephedrine (Sudafed, only after the trimester).  Acetaminophen (Tylenol).  Several home remedies for colds and coughs are also good options during pregnancy. That includes things like:  Vicks VapoRub® mentholated cream.  Neti pots, saline nasal drops or saline spray.  Gargling with salt water.

## 2024-11-27 NOTE — TELEPHONE ENCOUNTER
Pt called. Pt stated she has been waiting to hear back to schedule D&V. LMP is unknown but has HCG quant done. Pt stated she is willing to go to either location. Pt would like to be seen sooner. Pt made aware of message sent.

## 2024-11-28 LAB — BACTERIA UR CULT: NORMAL

## 2024-12-04 ENCOUNTER — ULTRASOUND (OUTPATIENT)
Dept: OBGYN CLINIC | Facility: CLINIC | Age: 24
End: 2024-12-04
Payer: COMMERCIAL

## 2024-12-04 VITALS — WEIGHT: 157.2 LBS | BODY MASS INDEX: 26.16 KG/M2 | SYSTOLIC BLOOD PRESSURE: 110 MMHG | DIASTOLIC BLOOD PRESSURE: 80 MMHG

## 2024-12-04 DIAGNOSIS — N91.1 SECONDARY AMENORRHEA: Primary | ICD-10-CM

## 2024-12-04 DIAGNOSIS — O09.899 HISTORY OF PRETERM DELIVERY, CURRENTLY PREGNANT: ICD-10-CM

## 2024-12-04 DIAGNOSIS — Z34.90 UNPLANNED PREGNANCY, UNKNOWN IF WANTED: ICD-10-CM

## 2024-12-04 DIAGNOSIS — Z3A.10 10 WEEKS GESTATION OF PREGNANCY: ICD-10-CM

## 2024-12-04 PROCEDURE — 99213 OFFICE O/P EST LOW 20 MIN: CPT | Performed by: PHYSICIAN ASSISTANT

## 2024-12-04 PROCEDURE — 76817 TRANSVAGINAL US OBSTETRIC: CPT | Performed by: PHYSICIAN ASSISTANT

## 2024-12-04 NOTE — ASSESSMENT & PLAN NOTE
Pregnancy was unplanned   She is considering termination but still unsure about this  Brochure to [facility] provided  Encouraged to schedule follow up visits with [facility] and [facility]NA until she makes a decision  Support provided

## 2024-12-04 NOTE — PROGRESS NOTES
Patient here for early ultrasound.   LMP: 9/15/24 approx  GA: 11w3d  RULA: 25    Unplanned Pregnancy  Irregular Cycles -was taking birth control continuously  Denies leaking of fluid, cramping, vomiting  Patient had brown discharge for 5 days, up until today. She is also having nausea that makes her not want to eat.   Accompanied by self  Patient was on birth control until she found out she was pregnant.

## 2024-12-04 NOTE — PROGRESS NOTES
Subjective  Patient ID: Shyam Rizvi is a 24 y.o. female here for Pregnancy Ultrasound    Newly Pregnant  Patient's LMP is 9/15/24 giving her an RULA of 25 and a gestational age of  11 weeks 3 days (based on LMP)    Menstrual cycle: irregular, cycle length: Pregnancy occurred on continuous cycling OCP  Pregnancy was unplanned/surpised.     She is unsure if she desires this pregnancy - considering termination but undecided    She has started taking a prenatal vitamin    She is C/O amenorrhea  Signs and symptoms of pregnancy:   Cramping or Pelvic Pain: no  Spotting or Vaginal Bleeding: brown discharge this week  Nausea or vomiting: yes; nausea    OB History    Para Term  AB Living   3 1 0 1 1 1   SAB IAB Ectopic Multiple Live Births   1 0 0 0 1      # Outcome Date GA Lbr Mitchell/2nd Weight Sex Type Anes PTL Lv   3 Current            2 SAB 24 6w0d    SAB      1  22 35w5d / 00:15 2495 g (5 lb 8 oz) M Vag-Spont None Y NIRU        The following portions of the patient's history were reviewed and updated as appropriate: allergies, current medications, past family history, past medical history, past social history, past surgical history, and problem list.    Perinent hx that may affect pregnancy:  Hx of SAB  Hx of  delivery at 35 wks- PPROM  Sickle cell trait   Depression      Review of Systems    See HPI for pertinent positives.             /80 (BP Location: Left arm, Patient Position: Sitting, Cuff Size: Standard)   Wt 71.3 kg (157 lb 3.2 oz)   LMP 09/15/2024 (Approximate)   BMI 26.16 kg/m²   OBGyn Exam  Results for orders placed or performed in visit on 24   POCT urine dip    Collection Time: 24  1:29 PM   Result Value Ref Range    LEUKOCYTE ESTERASE,UA Negative     NITRITE,UA Negative     SL AMB POCT UROBILINOGEN 0.2     POCT URINE PROTEIN trace      PH,UA 6.5     BLOOD,UA Negative     SPECIFIC GRAVITY,UA 1.020     KETONES,UA 80     BILIRUBIN,UA Negative      GLUCOSE, UA Negative      COLOR,UA Di-Gold     CLARITY,UA hazy    Poct Covid 19 Rapid Antigen Test    Collection Time: 24  1:35 PM   Result Value Ref Range    POCT SARS-CoV-2 Ag Positive (A) Negative    VALID CONTROL Valid    Urine culture    Collection Time: 24  2:34 PM    Specimen: Urine, Other   Result Value Ref Range    Urine Culture No Growth <1000 cfu/mL        FIRST TRIMESTER OBSTETRIC ULTRASOUND     Patient's last menstrual period was 09/15/2024 (approximate).    INDICATION: Establish Gestational Age       FINDINGS:  See imaging report for details     Additional Findings: ?anterior fibroid- will better characterize on future MFM US     FHR: 181 bpm    IMPRESSION:    Single intrauterine pregnancy of 10 weeks 0 days gestational age  Fetal cardiac activity detected.  No adnexal masses seen.  EDC by LMP: 25  EDC by this Ultrasound: 25    Assigning a Final RULA  Please choose how you are assigning the RULA: The gestational age by LMP is 9w 0d - 13w 6d and demonstrates more than 7 days difference from the gestational age by CRL, therefore the final RULA will be based on the ultrasound at this encounter    Final RULA: 2025 by ultrasound at this encounter.    Michaela Rangel PA-C  [address] [place] [address] [facility] [address] [address]  Dept: [phone]  Dept Fax: [phone]  Ultrasound Probe Disinfection    A transvaginal ultrasound was performed.   Prior to use, disinfection was performed with High Level Disinfection Process ([employer]).  Probe serial number: [id number]        Assessment/Plan:         1. Secondary amenorrhea  -     Ambulatory Referral to Maternal Fetal Medicine; Future; Expected date: 2024  -     AMB US OB < 14 weeks single or first gestation level 1  2. 10 weeks gestation of pregnancy  3. Unplanned pregnancy, unknown if wanted  Assessment & Plan:  Pregnancy was unplanned   She is considering  termination but still unsure about this  Brochure to [facility] provided  Encouraged to schedule follow up visits with [facility] and [facility]IGNA until she makes a decision  Support provided  4. History of  delivery, currently pregnant      Orders Placed This Encounter   Procedures    Ambulatory Referral to Maternal Fetal Medicine    AMB US OB < 14 weeks single or first gestation level 1

## 2024-12-05 ENCOUNTER — NURSE TRIAGE (OUTPATIENT)
Age: 24
End: 2024-12-05

## 2024-12-05 ENCOUNTER — TELEPHONE (OUTPATIENT)
Age: 24
End: 2024-12-05

## 2024-12-05 NOTE — TELEPHONE ENCOUNTER
"10w1d,  OB patient- called in reporting early this AM when she wiped, had bright red blood on the toilet paper- a little more than spotting. Looked in the toilet, and the water was a bit red red. She then started with some mild cramping a little bit ago 3/10, went to the restroom and just had some brown spotting on her underwear. No further active bleeding. O+ blood type. Patient did have her TVUS yesterday. Discussed with patient light spotting can be normal in pregnancy. It is known that in pregnancy, the cervix is very vascular which means there is an increase in blood flow to the area. Due to this, things such as vaginal exams or sexual intercourse can lead to spotting.   Instructed patient, she should put on a pad and continue monitoring for any further instances of bleeding. Reviewed with patient monitoring symptoms in the meantime, and contacting the office back with any new or worsening symptoms.    ESC sent to Dr Espinal.     Reason for Disposition   SPOTTING after a pelvic examination (single or brief episode)    Answer Assessment - Initial Assessment Questions  1. ONSET: \"When did this bleeding start?\"        This AM  2. BLEEDING SEVERITY: \"Describe the bleeding that you are having.\" \"How much bleeding is there?\"       Was more than spotting initially, now just brown spotting  3. ABDOMEN PAIN: \"Do you have any pain?\" \"How bad is the pain?\"  (e.g., Scale 0-10; none, mild, moderate, or severe)      Mild cramping 3/10  4. PREGNANCY: \"Do you know how many weeks or months pregnant you are?\" \"When was the first day of your last normal menstrual period?\"      10w1d  5. ULTRASOUND: \"Have you had an ultrasound during this pregnancy?\"  Note: To confirm intrauterine pregnancy, placenta location.      Yes  6. HEMODYNAMIC STATUS: \"Are you weak or feeling lightheaded?\" If Yes, ask: \"Can you stand and walk normally?\"       Denies  7. OTHER SYMPTOMS: \"What other symptoms are you having with the bleeding?\" (e.g., " passed tissue, vaginal discharge, fever, menstrual-type cramps)      Denies    Protocols used: Pregnancy - Vaginal Bleeding Less Than 20 Weeks EGA-Adult-[place]

## 2024-12-05 NOTE — PATIENT INSTRUCTIONS
Congratulations!! Please review our Pregnancy Essential Guide and [facility] L&D Virtual tour from our networks website.     [facility] Pregnancy Essentials Guide  [facility] ([email or web address])     [facility] ([employer])

## 2024-12-06 ENCOUNTER — INITIAL PRENATAL (OUTPATIENT)
Dept: OBGYN CLINIC | Facility: CLINIC | Age: 24
End: 2024-12-06

## 2024-12-06 VITALS — BODY MASS INDEX: 26.16 KG/M2 | HEIGHT: 65 IN | WEIGHT: 157 LBS

## 2024-12-06 DIAGNOSIS — Z31.430 ENCOUNTER OF FEMALE FOR TESTING FOR GENETIC DISEASE CARRIER STATUS FOR PROCREATIVE MANAGEMENT: ICD-10-CM

## 2024-12-06 DIAGNOSIS — Z34.81 PRENATAL CARE, SUBSEQUENT PREGNANCY, FIRST TRIMESTER: Primary | ICD-10-CM

## 2024-12-06 DIAGNOSIS — Z36.9 ENCOUNTER FOR ANTENATAL SCREENING: ICD-10-CM

## 2024-12-06 DIAGNOSIS — Z82.49 FAMILY HISTORY OF DVT: ICD-10-CM

## 2024-12-06 PROBLEM — O98.511 COVID-19 AFFECTING PREGNANCY IN FIRST TRIMESTER: Status: ACTIVE | Noted: 2024-12-06

## 2024-12-06 PROBLEM — Z30.011 ENCOUNTER FOR INITIAL PRESCRIPTION OF CONTRACEPTIVE PILLS: Status: RESOLVED | Noted: 2024-08-29 | Resolved: 2024-12-06

## 2024-12-06 PROBLEM — U07.1 COVID-19 AFFECTING PREGNANCY IN FIRST TRIMESTER: Status: ACTIVE | Noted: 2024-12-06

## 2024-12-06 PROCEDURE — OBC

## 2024-12-06 NOTE — PROGRESS NOTES
OB INTAKE INTERVIEW  Patient is 24 y.o. who presents for OB intake at 10 wks  She is accompanied by herself during this telephone encounter  The father of her baby (Brennan Lombardo) is involved in the pregnancy and is 25 years old.      Last Menstrual Period: 9/15/24  Ultrasound: Measured 10 weeks 2 days on   Estimated Date of Delivery: 25 changed by 10 week US    Signs/Symptoms of Pregnancy  Current pregnancy symptoms: nausea  Constipation YES, typical outside of pregnancy  Headaches YES, normal for you outside of pregnancy  Cramping/spotting YES, both x 1 yesterday bright red- turned to brown over night has since resolved.   PICA cravings no    Diabetes-  Body mass index is 26.13 kg/m².  If patient has 1 or more, please order early 1 hour GTT  History of GDM no  BMI >35 no  History of PCOS or current metformin use no  History of LGA/macrosomic infant (4000g/9lbs) no    If patient has 2 or more, please order early 1 hour GTT  BMI>30 no  AMA no  First degree relative with type 2 diabetes no  History of chronic HTN, hyperlipidemia, elevated A1C no  High risk race (, , ,  or ) YES    Hypertension- if you answer yes to any of the following, please order baseline preeclampsia labs (cbc, comprehensive metabolic panel, urine protein creatinine ratio, uric acid)  History of of chronic HTN no  History of gestational HTN no  History of preeclampsia, eclampsia, or HELLP syndrome no  History of diabetes no  History of lupus,sjogrens syndrome, kidney disease no    Thyroid- if yes order TSH with reflex T4  History of thyroid disease no    Bleeding Disorder or Hx of DVT-patient or first degree relative with history of. Order the following if not done previously.   (Factor V, antithrombin III, prothrombin gene mutation, protein C and S Ag, lupus anticoagulant, anticardiolipin, beta-2 glycoprotein)   YES, MOB'S father had a PE in 2016- unknown cause. (51  YO)    OB/GYN-  History of abnormal pap smear no       Date of last pap smear 22  History of HPV no  History of Herpes/HSV no  History of other STI (gonorrhea, chlamydia, trich) YES, Chlamydia and Gonorrhea 2019  History of prior  YES  History of prior  no  History of  delivery prior to 36 weeks 6 days YES  History of Varicella or Vaccination had disease as a child  History of blood transfusion no  Ok for blood transfusion YES    Substance screening-   History of tobacco use no  Currently using tobacco no  Substance Use Screen Level (N/A, LOW, HIGH) NA    MRSA Screening-   Does the pt have a hx of MRSA? no    Immunizations:  Influenza vaccine given this season Never had one  Discussed Tdap vaccine yes  Discussed COVID Vaccine no    Genetic/MFM-  Do you or your partner have a history of any of the following in yourselves or first degree relatives?  Cystic fibrosis no  Spinal muscular atrophy no  Hemoglobinopathy/Sickle Cell/Thalassemia YES, patient and FOB are both trait carriers  Fragile X Intellectual Disability no    If yes, discuss Carrier Screening and recommend consultation with MFM/Genetic Counseling and place specific [facility] Referral for.    If no, discuss Carrier Screening being completed once in a lifetime as a standard of care lab test. Place orders for Cystic Fibrosis Gene Test ([id number]) and Spinal Muscular Atrophy DNA ([id number])      Appointment for Nuchal Translucency Ultrasound at [facility] scheduled for reminded to call today to schedule appointment- appointment is time sensitive she reports she will call today to schedule.       Interview education  [facility] Pregnancy Essentials Book reviewed, discussed and attached to their AVS yes    Nurse/Family Partnership- patient may qualify no; referral placed no    Prenatal lab work scripts yes  Extra labs ordered:  DVT Panel  Carrier Screening    Aspirin/Preeclampsia Screen    Risk Level Risk Factor Recommendation   LOW Prior Uncomplicated  full-term delivery no No Aspirin recommendation        MODERATE Nulliparity no Recommend low-dose aspirin if     BMI>30 no 2 or more moderate risk factors    Family History Preeclampsia (mother/sister) no     35yr old or greater no     Black Race, Concern for SDOH/Low Socioeconomic YES     IVF Pregnancy  no     Personal History Risks (low birth weight, prior adverse preg outcome, >10yr preg interval) no         HIGH History of Preeclampsia no Recommend low-dose aspirin if     Multifetal gestation no 1 or more high risk factors    Chronic HTN no     Type 1 or 2 Diabetes no     Renal Disease no     Autoimmune Disease  no      Contraindications to ASA therapy:  NSAID/ ASA allergy: no  Nasal polyps: no  Asthma with history of ASA induced bronchospasm: no  Relative contraindications:  History of GI bleed: no  Active peptic ulcer disease: no  Severe hepatic dysfunction: no    Patient should be recommended to take ASA 162mg during this pregnancy from 12-36wks to lower her risk of preeclampsia: does not meet criteria           The patient has a history now or in prior pregnancy notable for:  Sickle Cell Trait Carrier , hx of depression  EPDS- pending. Family history of PE.       Details that I feel the provider should be aware of: This is a unplanned but welcomed pregnancy for Shyam and her significant other Brennan. She conceived on OCP (missed a day or two).  She is a previous patient to [facility] (acquired by [employer]). This is their second child together. Their son Dennis (2) was born  at 35w due to PPROM. She had a . She is overall feeling well so far, just some nausea. She has had some tooth pain where she has been taking Motrin for. Advised patient tylenol is the only recommended pain reliever in pregnancy. She is aware to make an appointment at the dentist and we can provide dental letter if needed. Family history of her father with a PE. DVT panel is ordered she is aware of Prior authorization for gene testing. Patient is aware of  PN1 labs and to have them completed before her next appointment. Carrier screening discussed patient aware of prior authorization and scheduling. Blue folder was not given and reviewed today as this was a telephone encounter.       PN1 visit scheduled. The patient was oriented to our practice, the navigator role, reviewed delivering physicians and [facility] for Delivery. All questions were answered.    Interviewed by: Zhane Riley RN

## 2024-12-06 NOTE — PROGRESS NOTES
21Prenatal Visit  Name: Shyam Rizvi      : 2000      MRN: [id number]  Encounter Provider: TREY Ceron  Encounter Date: 2024   Encounter department: [facility] [place]    :  Assessment & Plan  Gastroenteritis  Pt with N/V/D - started yesterday. Feels tired and week. Minimal intake in 24 hours Urine dipstick shows Large ketones and SG 1.025.  Likely GI virus. Sent to ED for IVF.     Orders:    Transfer to other facility    10 weeks gestation of pregnancy  She is a  at 10w2d  New OB Exam completed -  Pap is indicated and gonorrhea/chlamydia cultures collected.  See other A&P for risk factors/identified  Genetic screening/testing: average risk NIPT Appt with MFM scheduled.    I have reviewed the maternal as well as infant benefits of breastfeeding with the patient.  The patient currently plans to breastfeed.     I reviewed the reasons to call in the first trimester - namely vaginal bleeding, severe nausea and vomiting, severe pelvic pain, fevers or pain/burning with urination.    Reviewed indication for vaccines in Pregnancy: flu, Covid and RSV.     Orders:    Transfer to other facility    History of  delivery, currently pregnant  Prior PTD at 35w5d due to PPROM.            Sickle cell trait in mother affecting pregnancy (HCC)  Pt and FOB both sickle cell trait carriers. Pt reports was tested as a baby.  Testing ordered.  Increased risk of Fetal  disease. Recommended genetic counseling appt   Orders:    Ambulatory Referral to Maternal Fetal Medicine; Future    Hgb Fractionation Cascade; Future    Prenatal care, subsequent pregnancy, first trimester    Orders:    POCT urine dip    Screen for STD (sexually transmitted disease)    Orders:    Chlamydia/GC amplified DNA by PCR    Cervical cancer screening    Orders:    Liquid-based pap, screening    Family history of congenital anomaly  FOB with polydactly hands/feet.  Genetics consult  ordered    Orders:    Ambulatory Referral to Maternal Fetal Medicine; Future        History of Present Illness   History of Present Illness     Shyam is a 24 y.o. female, here for Initial PN visit/New OB exam    10w2d   She is reporting the following pregnancy related complaints:  C/O sudden increased N/V yesterday along with diarrhea. - just started yesterday. Denies fever  Denies VB, occasional cramping but  minimal  Some increase in d/c noted recently as well      The following portions of the patient's history were reviewed and updated as appropriate: allergies, current medications, past family history, past medical history, past social history, past surgical history, and problem list.    Genetic Family hx: FOB polydactaly; FOB sickle cell trait   Diabetes risk Factors: N/A   Hypertension Risk Factors: race,     Objective   LMP 09/15/2024 (Approximate)     OBGyn Exam- see prenatal physical form

## 2024-12-09 ENCOUNTER — HOSPITAL ENCOUNTER (EMERGENCY)
Facility: HOSPITAL | Age: 24
Discharge: HOME | End: 2024-12-09
Attending: EMERGENCY MEDICINE
Payer: COMMERCIAL

## 2024-12-09 ENCOUNTER — APPOINTMENT (EMERGENCY)
Dept: CT IMAGING | Facility: HOSPITAL | Age: 24
End: 2024-12-09
Payer: COMMERCIAL

## 2024-12-09 ENCOUNTER — INITIAL PRENATAL (OUTPATIENT)
Dept: OBGYN CLINIC | Facility: MEDICAL CENTER | Age: 24
End: 2024-12-09
Payer: COMMERCIAL

## 2024-12-09 VITALS
SYSTOLIC BLOOD PRESSURE: 121 MMHG | TEMPERATURE: 98.1 F | HEART RATE: 87 BPM | DIASTOLIC BLOOD PRESSURE: 59 MMHG | RESPIRATION RATE: 17 BRPM | OXYGEN SATURATION: 99 %

## 2024-12-09 VITALS
HEIGHT: 65 IN | SYSTOLIC BLOOD PRESSURE: 110 MMHG | WEIGHT: 157.4 LBS | BODY MASS INDEX: 26.23 KG/M2 | TEMPERATURE: 98.1 F | HEART RATE: 118 BPM | DIASTOLIC BLOOD PRESSURE: 80 MMHG

## 2024-12-09 DIAGNOSIS — K52.9 GASTROENTERITIS: Primary | ICD-10-CM

## 2024-12-09 DIAGNOSIS — O99.019 SICKLE CELL TRAIT IN MOTHER AFFECTING PREGNANCY (HCC): ICD-10-CM

## 2024-12-09 DIAGNOSIS — Z34.91 PREGNANT AND NOT YET DELIVERED IN FIRST TRIMESTER: ICD-10-CM

## 2024-12-09 DIAGNOSIS — E86.0 DEHYDRATION: ICD-10-CM

## 2024-12-09 DIAGNOSIS — Z82.79 FAMILY HISTORY OF CONGENITAL ANOMALY: ICD-10-CM

## 2024-12-09 DIAGNOSIS — O09.899 HISTORY OF PRETERM DELIVERY, CURRENTLY PREGNANT: ICD-10-CM

## 2024-12-09 DIAGNOSIS — Z12.4 CERVICAL CANCER SCREENING: ICD-10-CM

## 2024-12-09 DIAGNOSIS — Z11.3 SCREEN FOR STD (SEXUALLY TRANSMITTED DISEASE): ICD-10-CM

## 2024-12-09 DIAGNOSIS — D57.3 SICKLE CELL TRAIT IN MOTHER AFFECTING PREGNANCY (HCC): ICD-10-CM

## 2024-12-09 DIAGNOSIS — R79.89 POSITIVE D DIMER: ICD-10-CM

## 2024-12-09 DIAGNOSIS — K52.9 GASTROENTERITIS: ICD-10-CM

## 2024-12-09 DIAGNOSIS — Z3A.10 10 WEEKS GESTATION OF PREGNANCY: ICD-10-CM

## 2024-12-09 DIAGNOSIS — R11.2 NAUSEA AND VOMITING: ICD-10-CM

## 2024-12-09 DIAGNOSIS — Z34.81 PRENATAL CARE, SUBSEQUENT PREGNANCY, FIRST TRIMESTER: Primary | ICD-10-CM

## 2024-12-09 LAB
ALBUMIN SERPL BCG-MCNC: 4.1 G/DL (ref 3.5–5)
ALP SERPL-CCNC: 45 U/L (ref 34–104)
ALT SERPL W P-5'-P-CCNC: 17 U/L (ref 7–52)
ANION GAP SERPL CALCULATED.3IONS-SCNC: 7 MMOL/L (ref 4–13)
AST SERPL W P-5'-P-CCNC: 17 U/L (ref 13–39)
ATRIAL RATE: 117 BPM
BASOPHILS # BLD AUTO: 0.01 THOUSANDS/ÂΜL (ref 0–0.1)
BASOPHILS NFR BLD AUTO: 0 % (ref 0–1)
BILIRUB DIRECT SERPL-MCNC: 0.09 MG/DL (ref 0–0.2)
BILIRUB SERPL-MCNC: 0.42 MG/DL (ref 0.2–1)
BILIRUB UR QL STRIP: NEGATIVE
BUN SERPL-MCNC: 7 MG/DL (ref 5–25)
CALCIUM SERPL-MCNC: 8.9 MG/DL (ref 8.4–10.2)
CARDIAC TROPONIN I PNL SERPL HS: <2 NG/L (ref ?–50)
CHLORIDE SERPL-SCNC: 102 MMOL/L (ref 96–108)
CLARITY UR: CLEAR
CO2 SERPL-SCNC: 24 MMOL/L (ref 21–32)
COLOR UR: ABNORMAL
CREAT SERPL-MCNC: 0.47 MG/DL (ref 0.6–1.3)
D DIMER PPP FEU-MCNC: 1.2 UG/ML FEU
EOSINOPHIL # BLD AUTO: 0.02 THOUSAND/ÂΜL (ref 0–0.61)
EOSINOPHIL NFR BLD AUTO: 0 % (ref 0–6)
ERYTHROCYTE [DISTWIDTH] IN BLOOD BY AUTOMATED COUNT: 13.1 % (ref 11.6–15.1)
GFR SERPL CREATININE-BSD FRML MDRD: 137 ML/MIN/1.73SQ M
GLUCOSE SERPL-MCNC: 81 MG/DL (ref 65–140)
GLUCOSE UR STRIP-MCNC: NEGATIVE MG/DL
HCT VFR BLD AUTO: 36.1 % (ref 34.8–46.1)
HGB BLD-MCNC: 11.9 G/DL (ref 11.5–15.4)
HGB UR QL STRIP.AUTO: NEGATIVE
IMM GRANULOCYTES # BLD AUTO: 0.06 THOUSAND/UL (ref 0–0.2)
IMM GRANULOCYTES NFR BLD AUTO: 1 % (ref 0–2)
KETONES UR STRIP-MCNC: ABNORMAL MG/DL
LEUKOCYTE ESTERASE UR QL STRIP: NEGATIVE
LIPASE SERPL-CCNC: <6 U/L (ref 11–82)
LYMPHOCYTES # BLD AUTO: 0.48 THOUSANDS/ÂΜL (ref 0.6–4.47)
LYMPHOCYTES NFR BLD AUTO: 6 % (ref 14–44)
MCH RBC QN AUTO: 27.9 PG (ref 26.8–34.3)
MCHC RBC AUTO-ENTMCNC: 33 G/DL (ref 31.4–37.4)
MCV RBC AUTO: 85 FL (ref 82–98)
MONOCYTES # BLD AUTO: 0.38 THOUSAND/ÂΜL (ref 0.17–1.22)
MONOCYTES NFR BLD AUTO: 5 % (ref 4–12)
NEUTROPHILS # BLD AUTO: 7.31 THOUSANDS/ÂΜL (ref 1.85–7.62)
NEUTS SEG NFR BLD AUTO: 88 % (ref 43–75)
NITRITE UR QL STRIP: NEGATIVE
NRBC BLD AUTO-RTO: 0 /100 WBCS
P AXIS: 77 DEGREES
PH UR STRIP.AUTO: 6 [PH]
PLATELET # BLD AUTO: 287 THOUSANDS/UL (ref 149–390)
PMV BLD AUTO: 10.1 FL (ref 8.9–12.7)
POTASSIUM SERPL-SCNC: 3.7 MMOL/L (ref 3.5–5.3)
PR INTERVAL: 158 MS
PROT SERPL-MCNC: 7.7 G/DL (ref 6.4–8.4)
PROT UR STRIP-MCNC: NEGATIVE MG/DL
QRS AXIS: 88 DEGREES
QRSD INTERVAL: 74 MS
QT INTERVAL: 314 MS
QTC INTERVAL: 438 MS
RBC # BLD AUTO: 4.27 MILLION/UL (ref 3.81–5.12)
SL AMB  POCT GLUCOSE, UA: ABNORMAL
SL AMB LEUKOCYTE ESTERASE,UA: ABNORMAL
SL AMB POCT BILIRUBIN,UA: ABNORMAL
SL AMB POCT BLOOD,UA: ABNORMAL
SL AMB POCT CLARITY,UA: CLEAR
SL AMB POCT COLOR,UA: ABNORMAL
SL AMB POCT KETONES,UA: ABNORMAL
SL AMB POCT NITRITE,UA: ABNORMAL
SL AMB POCT PH,UA: 6
SL AMB POCT SPECIFIC GRAVITY,UA: 1.02
SL AMB POCT URINE PROTEIN: ABNORMAL
SL AMB POCT UROBILINOGEN: 0.2
SODIUM SERPL-SCNC: 133 MMOL/L (ref 135–147)
SP GR UR STRIP.AUTO: 1.02 (ref 1–1.03)
T WAVE AXIS: 66 DEGREES
UROBILINOGEN UR STRIP-ACNC: <2 MG/DL
VENTRICULAR RATE: 117 BPM
WBC # BLD AUTO: 8.26 THOUSAND/UL (ref 4.31–10.16)

## 2024-12-09 PROCEDURE — 93010 ELECTROCARDIOGRAM REPORT: CPT | Performed by: INTERNAL MEDICINE

## 2024-12-09 PROCEDURE — 85379 FIBRIN DEGRADATION QUANT: CPT

## 2024-12-09 PROCEDURE — 87086 URINE CULTURE/COLONY COUNT: CPT

## 2024-12-09 PROCEDURE — 96365 THER/PROPH/DIAG IV INF INIT: CPT

## 2024-12-09 PROCEDURE — 80048 BASIC METABOLIC PNL TOTAL CA: CPT

## 2024-12-09 PROCEDURE — PNV: Performed by: CLINICAL NURSE SPECIALIST

## 2024-12-09 PROCEDURE — 96375 TX/PRO/DX INJ NEW DRUG ADDON: CPT

## 2024-12-09 PROCEDURE — 85025 COMPLETE CBC W/AUTO DIFF WBC: CPT

## 2024-12-09 PROCEDURE — 96361 HYDRATE IV INFUSION ADD-ON: CPT

## 2024-12-09 PROCEDURE — 99285 EMERGENCY DEPT VISIT HI MDM: CPT

## 2024-12-09 PROCEDURE — 87591 N.GONORRHOEAE DNA AMP PROB: CPT | Performed by: CLINICAL NURSE SPECIALIST

## 2024-12-09 PROCEDURE — 80076 HEPATIC FUNCTION PANEL: CPT

## 2024-12-09 PROCEDURE — 84484 ASSAY OF TROPONIN QUANT: CPT

## 2024-12-09 PROCEDURE — G0145 SCR C/V CYTO,THINLAYER,RESCR: HCPCS | Performed by: CLINICAL NURSE SPECIALIST

## 2024-12-09 PROCEDURE — 83690 ASSAY OF LIPASE: CPT

## 2024-12-09 PROCEDURE — 93005 ELECTROCARDIOGRAM TRACING: CPT

## 2024-12-09 PROCEDURE — 81002 URINALYSIS NONAUTO W/O SCOPE: CPT | Performed by: CLINICAL NURSE SPECIALIST

## 2024-12-09 PROCEDURE — 87491 CHLMYD TRACH DNA AMP PROBE: CPT | Performed by: CLINICAL NURSE SPECIALIST

## 2024-12-09 PROCEDURE — 36415 COLL VENOUS BLD VENIPUNCTURE: CPT

## 2024-12-09 PROCEDURE — 71275 CT ANGIOGRAPHY CHEST: CPT

## 2024-12-09 PROCEDURE — 81003 URINALYSIS AUTO W/O SCOPE: CPT

## 2024-12-09 RX ORDER — ONDANSETRON 2 MG/ML
4 INJECTION INTRAMUSCULAR; INTRAVENOUS ONCE
Status: COMPLETED | OUTPATIENT
Start: 2024-12-09 | End: 2024-12-09

## 2024-12-09 RX ORDER — ACETAMINOPHEN 10 MG/ML
1000 INJECTION, SOLUTION INTRAVENOUS ONCE
Status: COMPLETED | OUTPATIENT
Start: 2024-12-09 | End: 2024-12-09

## 2024-12-09 RX ORDER — ONDANSETRON 4 MG/1
4 TABLET, FILM COATED ORAL EVERY 6 HOURS
Qty: 20 TABLET | Refills: 0 | Status: SHIPPED | OUTPATIENT
Start: 2024-12-09 | End: 2024-12-19

## 2024-12-09 RX ORDER — SENNOSIDES 8.6 MG
650 CAPSULE ORAL EVERY 8 HOURS PRN
Qty: 30 TABLET | Refills: 0 | Status: SHIPPED | OUTPATIENT
Start: 2024-12-09

## 2024-12-09 RX ADMIN — SODIUM CHLORIDE 1000 ML: 0.9 INJECTION, SOLUTION INTRAVENOUS at 11:39

## 2024-12-09 RX ADMIN — IOHEXOL 55 ML: 350 INJECTION, SOLUTION INTRAVENOUS at 14:04

## 2024-12-09 RX ADMIN — ACETAMINOPHEN 1000 MG: 1000 INJECTION, SOLUTION INTRAVENOUS at 11:36

## 2024-12-09 RX ADMIN — ONDANSETRON 4 MG: 2 INJECTION, SOLUTION INTRAMUSCULAR; INTRAVENOUS at 11:36

## 2024-12-09 NOTE — Clinical Note
Shyam Rizvi was seen and treated in our emergency department on 12/9/2024.    No restrictions            Diagnosis:     Shyam  may return to work on return date, is off the rest of the shift today.    She may return on this date: 12/11/2024         If you have any questions or concerns, please don't hesitate to call.      Kirill New PA-C    ______________________________           _______________          _______________  Hospital Representative                              Date                                Time

## 2024-12-09 NOTE — ASSESSMENT & PLAN NOTE
Pt and FOB both sickle cell trait carriers. Pt reports was tested as a baby.  Testing ordered.  Increased risk of Fetal  disease. Recommended genetic counseling appt   Orders:    Ambulatory Referral to Maternal Fetal Medicine; Future    Hgb Fractionation Cascade; Future

## 2024-12-09 NOTE — PROGRESS NOTES
RULA 7/2/25  Blood type O+  Labs UTD  Pap 6/13/22 neg  GC collected today  Flu declined  Denies LOF, VB, or CTX.  Having some discomfort and had a clump of discharge this morning.  Feels weak, threw up last night, nauseated.

## 2024-12-09 NOTE — ED PROVIDER NOTES
Time reflects when diagnosis was documented in both MDM as applicable and the Disposition within this note       Time User Action Codes Description Comment    12/9/2024  3:11 PM Kirill New [K52.9] Gastroenteritis     12/9/2024  3:11 PM Kirill New [R79.89] Positive D dimer     12/9/2024  3:11 PM Kirill New [Z34.91] Pregnant and not yet delivered in first trimester     12/9/2024  3:12 PM Kirill New [E86.0] Dehydration     12/9/2024  3:12 PM Kirill New [R11.2] Nausea and vomiting           ED Disposition       ED Disposition   Discharge    Condition   Stable    Date/Time   Mon Dec 9, 2024  3:11 PM    Comment   Shyam Rizvi discharge to home/self care.                   Assessment & Plan       Medical Decision Making  24-year-old female presents today with concerns of nausea, vomiting, diarrhea, chest pain, abdominal pain, over the last couple days.  Did recently have COVID about 10 days ago.  States that she has not been able to eat in at least 24 hours given her vomiting.  She denies any blood or discharge from the vagina.  No blood in the stool, urine.  Does admit to some dysuria.  She is about 10 weeks pregnant.  Labs, ACS rule out.  Patient tachycardic, dimer.  Dimer positive.  Mild hyponatremia.  Given patient's pregnancy status, long discussion and shared decision-making was had, patient would like to proceed with CT scan to rule out pulmonary embolism.  Risk and benefits were discussed.  CTA Chest ordered.  CTA negative for acute PE. Patient feeling better after medications.  ------------------------------------------------------------  Strict return precautions discussed. Patient at time of discharge well-appearing in no acute distress, all questions answered. Patient agreeable to plan.  Patient's vitals, lab/imaging results, diagnosis, and treatment plan were discussed with the patient. All new/changed medications were discussed with patient, specifically, route of  "administration, how often and when to take, and where they can be picked up. Strict return precautions as well as close follow up with PCP was discussed with the patient and the patient was agreeable to my recommendations.  Patient verbally acknowledged understanding of the above communications. All labs reviewed and utilized in the medical decision making process (if labs were ordered). Portions of the record may have been created with voice recognition software.  Occasional wrong word or \"sound a like\" substitutions may have occurred due to the inherent limitations of voice recognition software.  Read the chart carefully and recognize, using context, where substitutions have occurred.      Amount and/or Complexity of Data Reviewed  Labs: ordered. Decision-making details documented in ED Course.  Radiology: ordered.    Risk  OTC drugs.  Prescription drug management.        ED Course as of 12/11/24 1548   Mon Dec 09, 2024   1349 Ketones, UA(!): 80 (3+)       Medications   acetaminophen (Ofirmev) injection 1,000 mg (0 mg Intravenous Stopped 12/9/24 1226)   ondansetron (ZOFRAN) injection 4 mg (4 mg Intravenous Given 12/9/24 1136)   sodium chloride 0.9 % bolus 1,000 mL (0 mL Intravenous Stopped 12/9/24 1348)   iohexol (OMNIPAQUE) 350 MG/ML injection (MULTI-DOSE) 55 mL (55 mL Intravenous Given 12/9/24 1404)       ED Risk Strat Scores                       PERC Rule for PE      Flowsheet Row Most Recent Value   PERC Rule for PE    Age >=50 0 Filed at: 12/09/2024 1123   HR >=100 0 Filed at: 12/09/2024 1123   O2 Sat on room air < 95% 0 Filed at: 12/09/2024 1123   History of PE or DVT 0 Filed at: 12/09/2024 1123   Recent trauma or surgery 0 Filed at: 12/09/2024 1123   Hemoptysis 0 Filed at: 12/09/2024 1123   Exogenous estrogen 0 Filed at: 12/09/2024 1123   Unilateral leg swelling 0 Filed at: 12/09/2024 1123   PERC Rule for PE Results 0 Filed at: 12/09/2024 1123                Wells' Criteria for PE      Flowsheet Row Most " Recent Value   Wells' Criteria for PE    Clinical signs and symptoms of DVT 0 Filed at: 12/09/2024 1124   PE is primary diagnosis or equally likely 0 Filed at: 12/09/2024 1124   HR >100 0 Filed at: 12/09/2024 1124   Immobilization at least 3 days or Surgery in the previous 4 weeks 0 Filed at: 12/09/2024 1124   Previous, objectively diagnosed PE or DVT 0 Filed at: 12/09/2024 1124   Hemoptysis 0 Filed at: 12/09/2024 1124   Malignancy with treatment within 6 months or palliative 0 Filed at: 12/09/2024 1124   Wells' Criteria Total 0 Filed at: 12/09/2024 1124                        History of Present Illness       Chief Complaint   Patient presents with    Abdominal Pain     Abd pain, midsternal chest pain and vomiting. 10wks pregnant, prenatal center believes pt has stomach bug but sent pt here for eval.       Past Medical History:   Diagnosis Date    Anemia     Chlamydia 2019    COVID-19 11/26/2022 2024    Depression     Gonorrhea     2019   tx'd    Migraine     Sickle cell trait (HCC)     Varicella     childhood chickenpox      Past Surgical History:   Procedure Laterality Date    NO PAST SURGERIES        Family History   Problem Relation Age of Onset    Mental illness Mother     Depression Mother     Gout Mother     Sickle cell trait Mother     Sickle cell trait Father     Pulmonary embolism Father     Hypertension Half-Brother     Panic disorder Half-Brother     Depression Half-Brother     Stroke Maternal Grandmother     Diabetes Maternal Grandmother     Alcohol abuse Maternal Grandfather     Cancer Paternal Grandmother     Cancer Paternal Grandfather     Breast cancer Cousin     Schizophrenia Half-Brother     Sickle cell trait Half-Brother     Colon cancer Neg Hx     Ovarian cancer Neg Hx     Uterine cancer Neg Hx     Cervical cancer Neg Hx       Social History     Tobacco Use    Smoking status: Never    Smokeless tobacco: Never   Vaping Use    Vaping status: Never Used   Substance Use Topics    Alcohol use: Not  Currently     Comment: nothing after 1st month of pregnancy    Drug use: Not Currently     Types: Marijuana     Comment: marijuana-last used 2023-FOB-marijuana; denies parent use- fob's uncle-heroin      E-Cigarette/Vaping    E-Cigarette Use Never User       E-Cigarette/Vaping Substances    Nicotine No     THC No     CBD No     Flavoring No     Other No     Unknown No       I have reviewed and agree with the history as documented.     24-year-old female presents today with concerns of nausea, vomiting, diarrhea, chest pain, abdominal pain, over the last couple days.  Did recently have COVID about 10 days ago.  States that she has not been able to eat in at least 24 hours given her vomiting.  She denies any blood or discharge from the vagina.  No blood in the stool, urine.  Does admit to some dysuria.  She is about 10 weeks pregnant.  No cough.  No fever.  No shortness of breath.        Review of Systems   Constitutional:  Negative for chills and fever.   HENT:  Negative for ear pain and sore throat.    Eyes:  Negative for pain and visual disturbance.   Respiratory:  Negative for apnea, cough, choking, chest tightness, shortness of breath, wheezing and stridor.    Cardiovascular:  Positive for chest pain. Negative for palpitations.   Gastrointestinal:  Positive for abdominal pain, diarrhea, nausea and vomiting. Negative for abdominal distention, anal bleeding, blood in stool, constipation and rectal pain.   Genitourinary:  Negative for dysuria and hematuria.   Musculoskeletal:  Negative for arthralgias and back pain.   Skin:  Negative for color change and rash.   Neurological:  Negative for seizures and syncope.   All other systems reviewed and are negative.          Objective       ED Triage Vitals [12/09/24 1049]   Temperature Pulse Blood Pressure Respirations SpO2 Patient Position - Orthostatic VS   98.1 °F (36.7 °C) (!) 119 111/63 18 100 % Sitting      Temp Source Heart Rate Source BP Location FiO2 (%) Pain Score     Oral Monitor Right arm -- --      Vitals      Date and Time Temp Pulse SpO2 Resp BP Pain Score FACES Pain Rating User   12/09/24 1420 -- 87 99 % 17 121/59 -- -- KB   12/09/24 1124 -- 98 -- -- -- -- -- ST   12/09/24 1049 98.1 °F (36.7 °C) 119 100 % 18 111/63 -- -- LR            Physical Exam  Vitals and nursing note reviewed.   Constitutional:       General: She is not in acute distress.     Appearance: She is well-developed. She is ill-appearing.   HENT:      Head: Normocephalic and atraumatic.   Eyes:      Conjunctiva/sclera: Conjunctivae normal.   Cardiovascular:      Rate and Rhythm: Normal rate and regular rhythm.      Heart sounds: No murmur heard.  Pulmonary:      Effort: Pulmonary effort is normal. No respiratory distress.      Breath sounds: Normal breath sounds.   Abdominal:      Palpations: Abdomen is soft.      Tenderness: There is generalized abdominal tenderness. There is no guarding or rebound. Negative signs include Machado's sign.      Hernia: No hernia is present.   Musculoskeletal:         General: No swelling.      Cervical back: Neck supple.   Skin:     General: Skin is warm and dry.      Capillary Refill: Capillary refill takes less than 2 seconds.   Neurological:      Mental Status: She is alert.   Psychiatric:         Mood and Affect: Mood normal.         Results Reviewed       Procedure Component Value Units Date/Time    Urine culture [[id number]] Collected: 12/09/24 1333    Lab Status: Preliminary result Specimen: Urine, Clean Catch Updated: 12/11/24 0746     Urine Culture Culture too young- will reincubate    UA w Reflex to Microscopic w Reflex to Culture [[id number]]  (Abnormal) Collected: 12/09/24 1333    Lab Status: Final result Specimen: Urine, Clean Catch Updated: 12/09/24 1346     Color, UA Light Yellow     Clarity, UA Clear     Specific Gravity, UA 1.016     pH, UA 6.0     Leukocytes, UA Negative     Nitrite, UA Negative     Protein, UA Negative mg/dl      Glucose, UA Negative mg/dl       Ketones, UA 80 (3+) mg/dl      Urobilinogen, UA <2.0 mg/dl      Bilirubin, UA Negative     Occult Blood, UA Negative     URINE COMMENT --    D-dimer, quantitative [[id number]]  (Abnormal) Collected: 12/09/24 1136    Lab Status: Final result Specimen: Blood from Arm, Right Updated: 12/09/24 1232     D-Dimer, Quant 1.20 ug/ml FEU     HS Troponin 0hr (reflex protocol) [[id number]]  (Normal) Collected: 12/09/24 1139    Lab Status: Final result Specimen: Blood from Arm, Right Updated: 12/09/24 1214     hs TnI 0hr <2 ng/L     Basic metabolic panel [[id number]]  (Abnormal) Collected: 12/09/24 1136    Lab Status: Final result Specimen: Blood from Arm, Right Updated: 12/09/24 1210     Sodium 133 mmol/L      Potassium 3.7 mmol/L      Chloride 102 mmol/L      CO2 24 mmol/L      ANION GAP 7 mmol/L      BUN 7 mg/dL      Creatinine 0.47 mg/dL      Glucose 81 mg/dL      Calcium 8.9 mg/dL      eGFR 137 ml/min/1.73sq m     Narrative:      National Kid[employer] guidelines for Chronic Kidney Disease (CKD):     Stage 1 with normal or high GFR (GFR > 90 mL/min/1.73 square meters)    Stage 2 Mild CKD (GFR = 60-89 mL/min/1.73 square meters)    Stage 3A Moderate CKD (GFR = 45-59 mL/min/1.73 square meters)    Stage 3B Moderate CKD (GFR = 30-44 mL/min/1.73 square meters)    Stage 4 Severe CKD (GFR = 15-29 mL/min/1.73 square meters)    Stage 5 End Stage CKD (GFR <15 mL/min/1.73 square meters)  Note: GFR calculation is accurate only with a steady state creatinine    Hepatic function panel [[id number]]  (Normal) Collected: 12/09/24 1136    Lab Status: Final result Specimen: Blood from Arm, Right Updated: 12/09/24 1210     Total Bilirubin 0.42 mg/dL      Bilirubin, Direct 0.09 mg/dL      Alkaline Phosphatase 45 U/L      AST 17 U/L      ALT 17 U/L      Total Protein 7.7 g/dL      Albumin 4.1 g/dL     Lipase [[id number]]  (Abnormal) Collected: 12/09/24 1136    Lab Status: Final result Specimen: Blood from Arm, Right Updated: 12/09/24  1210     Lipase <6 u/L     CBC and differential [[id number]]  (Abnormal) Collected: 12/09/24 1136    Lab Status: Final result Specimen: Blood from Arm, Right Updated: 12/09/24 1152     WBC 8.26 Thousand/uL      RBC 4.27 Million/uL      Hemoglobin 11.9 g/dL      Hematocrit 36.1 %      MCV 85 fL      MCH 27.9 pg      MCHC 33.0 g/dL      RDW 13.1 %      MPV 10.1 fL      Platelets 287 Thousands/uL      nRBC 0 /100 WBCs      Segmented % 88 %      Immature Grans % 1 %      Lymphocytes % 6 %      Monocytes % 5 %      Eosinophils Relative 0 %      Basophils Relative 0 %      Absolute Neutrophils 7.31 Thousands/µL      Absolute Immature Grans 0.06 Thousand/uL      Absolute Lymphocytes 0.48 Thousands/µL      Absolute Monocytes 0.38 Thousand/µL      Eosinophils Absolute 0.02 Thousand/µL      Basophils Absolute 0.01 Thousands/µL             CTA chest pe study   Final Interpretation by Shyam Cm MD (12/09 1501)      No pulmonary embolism. Clear lungs.                  Workstation performed: [id number]             ECG 12 Lead Documentation Only    Date/Time: 12/9/2024 1:17 PM    Performed by: Kirill New PA-C  Authorized by: Kirill New PA-C    Indications / Diagnosis:  Cp  ECG reviewed by me, the ED Provider: yes    Patient location:  ED  Previous ECG:     Previous ECG:  Unavailable    Comparison to cardiac monitor: No    Interpretation:     Interpretation: non-specific    Rate:     ECG rate:  117    ECG rate assessment: tachycardic    Rhythm:     Rhythm: sinus tachycardia    Ectopy:     Ectopy: none    QRS:     QRS axis:  Normal    QRS intervals:  Normal  Conduction:     Conduction: normal    ST segments:     ST segments:  Non-specific  T waves:     T waves: normal        ED Medication and Procedure Management   Prior to Admission Medications   Prescriptions Last Dose Informant Patient Reported? Taking?   Prenatal Vit-Fe Fumarate-FA (PRENATAL PO)   Yes No   Sig: Take by mouth   albuterol (ProAir HFA) 90 mcg/act  inhaler   No No   Sig: Inhale 2 puffs every 6 (six) hours as needed for wheezing   sertraline (Zoloft) 50 mg tablet   No No   Sig: Take 1 tablet (50 mg total) by mouth daily      Facility-Administered Medications: None     Discharge Medication List as of 12/9/2024  3:13 PM        START taking these medications    Details   acetaminophen (TYLENOL) 650 mg CR tablet Take 1 tablet (650 mg total) by mouth every 8 (eight) hours as needed for mild pain, Starting Mon 12/9/2024, Normal      ondansetron (ZOFRAN) 4 mg tablet Take 1 tablet (4 mg total) by mouth every 6 (six) hours, Starting Mon 12/9/2024, Normal           CONTINUE these medications which have NOT CHANGED    Details   albuterol (ProAir HFA) 90 mcg/act inhaler Inhale 2 puffs every 6 (six) hours as needed for wheezing, Starting Wed 11/27/2024, Normal      Prenatal Vit-Fe Fumarate-FA (PRENATAL PO) Take by mouth, Historical Med      sertraline (Zoloft) 50 mg tablet Take 1 tablet (50 mg total) by mouth daily, Starting Tue 10/15/2024, Normal           No discharge procedures on file.  ED SEPSIS DOCUMENTATION   Time reflects when diagnosis was documented in both MDM as applicable and the Disposition within this note       Time User Action Codes Description Comment    12/9/2024  3:11 PM Kirill New [K52.9] Gastroenteritis     12/9/2024  3:11 PM Kirill New [R79.89] Positive D dimer     12/9/2024  3:11 PM Kirill New [Z34.91] Pregnant and not yet delivered in first trimester     12/9/2024  3:12 PM Kirill New [E86.0] Dehydration     12/9/2024  3:12 PM Kirill New [R11.2] Nausea and vomiting                  Kirill New PA-C  12/11/24 [id number]

## 2024-12-09 NOTE — ASSESSMENT & PLAN NOTE
FOB with polydactly hands/feet.  Genetics consult ordered    Orders:    Ambulatory Referral to Maternal Fetal Medicine; Future

## 2024-12-09 NOTE — ASSESSMENT & PLAN NOTE
She is a  at 10w2d  New OB Exam completed -  Pap is indicated and gonorrhea/chlamydia cultures collected.  See other A&P for risk factors/identified  Genetic screening/testing: average risk NIPT Appt with MFM scheduled.    I have reviewed the maternal as well as infant benefits of breastfeeding with the patient.  The patient currently plans to breastfeed.     I reviewed the reasons to call in the first trimester - namely vaginal bleeding, severe nausea and vomiting, severe pelvic pain, fevers or pain/burning with urination.    Reviewed indication for vaccines in Pregnancy: flu, Covid and RSV.     Orders:    Transfer to other facility

## 2024-12-10 ENCOUNTER — TELEPHONE (OUTPATIENT)
Age: 24
End: 2024-12-10

## 2024-12-10 NOTE — TELEPHONE ENCOUNTER
Patient calling in stating that she's 10w6d  pt went to the dentist and antibiotics are being prescribed. Pt wanting to know if she can take amoxicillin. Pt was provided the dental letter via [employer]

## 2024-12-10 NOTE — TELEPHONE ENCOUNTER
10w6d OB patient seen yesterday and went to the ED for dehydration. [facility] s/s of a UTI and wasn't sure if they actually tested her urine as she is feeling urinary burning. Informed patient, it looks like they did order a urine culture however it is still in process. Encouraged increasing water intake as well as drinking cranberry juice to help with symptoms of a UTI.

## 2024-12-11 ENCOUNTER — RESULTS FOLLOW-UP (OUTPATIENT)
Dept: EMERGENCY DEPT | Facility: HOSPITAL | Age: 24
End: 2024-12-11

## 2024-12-11 ENCOUNTER — RESULTS FOLLOW-UP (OUTPATIENT)
Dept: OBGYN CLINIC | Facility: MEDICAL CENTER | Age: 24
End: 2024-12-11

## 2024-12-11 LAB
C TRACH DNA SPEC QL NAA+PROBE: NEGATIVE
N GONORRHOEA DNA SPEC QL NAA+PROBE: NEGATIVE

## 2024-12-12 ENCOUNTER — TELEMEDICINE (OUTPATIENT)
Facility: HOSPITAL | Age: 24
End: 2024-12-12

## 2024-12-12 DIAGNOSIS — O35.2XX0 HEREDITARY DISEASE IN FAMILY POSSIBLY AFFECTING FETUS, AFFECTING MANAGEMENT OF MOTHER IN PREGNANCY, SINGLE OR UNSPECIFIED FETUS: ICD-10-CM

## 2024-12-12 DIAGNOSIS — O99.019 SICKLE CELL TRAIT IN MOTHER AFFECTING PREGNANCY (HCC): Primary | ICD-10-CM

## 2024-12-12 DIAGNOSIS — D57.3 SICKLE CELL TRAIT IN MOTHER AFFECTING PREGNANCY (HCC): Primary | ICD-10-CM

## 2024-12-12 DIAGNOSIS — Z31.5 ENCOUNTER FOR PROCREATIVE GENETIC COUNSELING: ICD-10-CM

## 2024-12-12 LAB — BACTERIA UR CULT: NORMAL

## 2024-12-12 PROCEDURE — NC001 PR NO CHARGE

## 2024-12-12 NOTE — PROGRESS NOTES
Genetic Counseling Note    Appointment Date:  2024  Referred By: Lizzy Georges CRNP  YOB: 2000  Partner:  Brennan Lombardo  Indication for Visit:  personal and/or family history of genetic disorder:  sickle cell disease  Pregnancy History:   Estimated Date of Delivery: 2025  Estimated Gestational Age: 11w1d      Virtual Regular Visit  Name: Shyam Rizvi      : 2000      MRN: [id number]  Encounter Provider: Lizzy Martinez  Encounter Date: 2024   Encounter department: [facility]      Verification of patient location:  Patient is located at Home in the following state in which I hold an active license: [place] :  Assessment & Plan      Encounter provider Lizzy Martinez    The patient was identified by name and date of birth. Shyam Rizvi was informed that this is a telemedicine visit and that the visit is being conducted through the Epic Embedded platform. She agrees to proceed. My office door was closed. No one else was in the room. She acknowledged consent and understanding of privacy and security of the video platform. The patient has agreed to participate and understands they can discontinue the visit at any time.    Shyam is a 24 y.o. female who presented with her partner Brennan for genetic counseling to discuss the risk for sickle cell disease in her pregnancy.    Shyma reports that she tested positive for sickle cell trait (sickle cell carrier status) as a baby, test report is not available for review. Hemoglobin electrophoresis has been ordered in Epic. Shyam's half-nephew (maternal half-brother's son) is said to have sickle cell trait as well; this individual is stated to have been erroneously diagnosed with sickle cell disease as a , but further testing at age 2 clarified that he was only a carrier. Brennan reports that his mother was affected with sickle cell disease, which makes him an obligate carrier of a  beta-globinopathy. He believes he was confirmed to be a carrier in infancy, but no test report is available for review. Brennan's mother passed away at age 27 from complications of a cancer that was diagnosed at age 26, but Brennan does not know further details about the type of cancer or his mother's condition. He is unaware of any other relatives with sickle cell disease. Shyam and Brennan have a healthy son together.     Beta globin chains are a component of adult hemoglobin. Genetic conditions affecting beta globins are caused by biallelic pathogenic variants in the HBB gene. Beta-globinopathies comprise both structural changes such as hemoglobin S, C, or E, and deficiencies (beta thalassemia). Sickle cell disease (SCD) is caused either by two HBB variants that each lead to hemoglobin S/sickle cell, or one such variant occurring in concert with another beta-globinopathy. SCD is characterized by chronic hemolytic anemia and intermittent vaso-occlusive events which are acutely painful and lead to organ damage. Symptoms may vary between different subtypes of SCD (e.g. hemoglobin SS vs. hemoglobin SC disease). Historically, SCD has been associated with severe morbidity and the need for lifelong medical treatment; while targeted treatments are now available, it is still a serious condition. We discussed the autosomal recessive nature of beta-globinopathies and that individuals with sickle cell trait (carriers) are not expected to have symptoms of SCD except in extreme conditions.      Although test reports confirming the carrier status of both Rubi are not currently available, it is likely that they are both beta-globinopathy carriers. This means that any offspring the couple may share would have a 25% chance of being affected with sickle cell disease, a 50% chance of being a carrier, and a 25% chance of having typical hemoglobin. We discussed that while Brennan is an obligate carrier, he may not have  "sickle cell trait proper (Hb S), as his mother could have had hemoglobin SC disease or a similar condition. However, if Shyam has sickle cell trait (Hb S), any beta-globinopathy trait that Brennan could have would result in a form of sickle cell disease if both variants were inherited together. We discussed the option of beta-globinopathy carrier testing for Brennan. We also reviewed  screening in the [facility] for hemoglobinopathies.     We discussed that definitive diagnosis for the pregnancy would necessitate a diagnostic test such as CVS or amniocentesis. We reviewed the availability of [place] single gene NIPT to screen for both aneuploidy and single gene disorders including beta-globinopathies, alpha thalassemia, cystic fibrosis, and spinal muscular atrophy without needing a paternal sample or known paternal carrier status. We discussed that the test is designed to first screen the pregnant individual for pathogenic variants, then provide a fetal risk estimate for any relevant conditions, expressed as a probability (e.g. \"1 in 2 chance of being affected\" or \"less than a 1 in 3200 chance\"). This approach lacks the accuracy of diagnostic testing but does provide some information about fetal risk without increasing the risk of miscarriage. As Shyam has not yet completed carrier screening for cystic fibrosis and spinal muscular atrophy, the [place] single gene NIPT could replace these tests while screening the fetus for SCD.    Shyam states that she declined amniocentesis in her first pregnancy as she would not terminate due to sickle cell disease, which she affirmed today. We discussed that people may pursue genetic testing during pregnancy for different reasons. While some people might consider terminating an affecting pregnancy, others may want testing only for peace of mind, or to prepare for the birth of a child with special medical needs. Shyam reports she would be interested in knowing more " so that she could research and prepare, but she will likely do that either way. She declines CVS and amniocentesis due to the risk of miscarriage. She declines [place] single gene NIPT as she feels the uncertainty of screening results would cause more anxiety. We reviewed that testing is available later in pregnancy if the couple decides they would like more information. We discussed that symptoms of SCD are not expected to present at birth, due to the fact our bodies make different hemoglobin as fetuses, and that even someone with two sickle cell mutations will still not have a large percentage of Hb S in their cells until a few months after birth. Thus, knowledge of sickle cell disease in the fetus would not change pregnancy management. Brennan declined hemoglobinopathy screening for himself.     We reviewed the concepts of aneuploidy and the availability of both screening and diagnostic testing for this during pregnancy. We discussed the availability of an ultrasound between 11-14 weeks gestation to measure the nuchal translucency (NT), which can assess for chromosome abnormalities, cardiac defects, and other adverse pregnancy outcomes. We reviewed that level II anatomy ultrasound is typically performed at approximately 20 weeks gestation. Level II ultrasound evaluation is between 60-80% accurate in detecting major physical birth defects and variations in fetal development that may be associated with chromosome/genetic abnormalities. Level II ultrasound evaluation is not able to detect all birth defects or health problems. Shyam is scheduled for a nuchal translucency ultrasound on 12/[facility]/2024 at [facility]. She may elect to pursue aneuploidy screening at that visit.    Histories for the patient and her partner's family were taken during our session. Shyam reports a maternal first cousin once removed (mother's sister's son's daughter), age 32, with intellectual disability. She is uncertain of further  "details about this person's condition. In the absence of a known specific genetic etiology, we cannot provide a precise recurrence risk. Shyam reports that multiple family members including her father and her paternal uncle (father's brother) have a history of blood clots; her father is said to have had a pulmonary embolism, and her uncle reportedly passed away from complications of multiple blood clots. Thrombophilia testing was ordered for Shyam to provide insight into her own risk for blood clots, but she reports that she will not complete this testing as it was not covered by her insurance. I recommended that she ask if her father was ever tested for thrombophilia; if he tested positive for something, insurance may be more likely to cover testing for that condition in Shyam.    Brennan reports a personal and family history of polydactyly, as he and his mother were born with extra fingers and toes (removed after birth). We discussed that isolated polydactyly has a prevalence of 1 in 700-1000 live births, with unilateral being more common. Postaxial polydactyly is observed more frequently in  ancestry and is often hereditary following an autosomal dominant pattern. Polydactyly can be associated with various chromosome abnormalities and genetic syndromes in conjunction with other findings. Given the family history, the risk to the current pregnancy for polydactyly is 50%, but it is not expected to be associated with any other clinical features. Brennan also reports a maternal half-niece (maternal half-brother's daughter), age 5, with \"brittle bone disease\". This individual is said to use a wheelchair. Brennan states that his niece is believed to have inherited from her mother (no biological relation to Brennan), who is affected with the same condition. No other affected relatives are known. Although records are not available for review, from Brennan's description it is likely that his niece has " "osteogenesis imperfecta. Osteogenesis imperfecta (OI), colloquially known as \"brittle bone disease\", is a skeletal dysplasia characterized by easily fracturing bones. Multiple types of OI exist, with different inheritance patterns including autosomal dominant, autosomal recessive, and X-linked. Most cases of OI are caused by monoallelic variants in the COL1A1 or COL1A2 genes; they have an autosomal dominant inheritance pattern and thus are either inherited from an affected parent or occur de fco. An autosomal dominant inheritance pattern would be consistent with Brennan's reported family history, but a rarer type of autosomal recessive OI cannot be entirely ruled out. The risk for OI in Brennan's offspring is most likely that of the average population    Further review of family history for the patient and her partner was noncontributory. The family history was not significant for other genetic diseases or disorders, fetal loss, or consanguinity. Patient reports being of Black descent and that her partner is of [place]i/[place]/ descent. She denies either of them having known Ashkenazi Baptist ancestry. Cystic fibrosis and spinal muscular atrophy carrier screening have been ordered but not yet completed. We reviewed the option of expanded carrier screening.  We discussed that the panels can test for carrier status for over 500 autosomal recessive and X-linked diseases. All of the diseases included on the panel are life threatening, life limiting, or have treatments available. After reviewing the benefits and limitations of expanded carrier screening Shyam declined.    Lastly, we discussed the fact that everyone in the general population regardless of age, family history, or medical background has approximately a 3-5% risk of having a child with some type of congenital anomaly, genetic disease or intellectual disability. Currently there are no tests available to rule out all birth defects or health " tanvir.    Shyam was provided with our contact information. I encouraged her to call with any questions or concerns.    Plan/Tests Ordered:  1) Patient declined CVS, amniocentesis, expanded carrier screening, and [place] single gene NIPT; patient's partner declined hemoglobinopathy screening.  2) CF, SMA, and hemoglobinopathy carrier screening orders already placed in Epic by OB provider.  3) NT ultrasound scheduled on 12/19/2024 at [facility] site.    Time spent with Genetic Counselor: 44 minutes         Visit Time  Total Visit Duration: 44 minutes

## 2024-12-13 LAB
LAB AP GYN PRIMARY INTERPRETATION: NORMAL
LAB AP LMP: NORMAL
Lab: NORMAL

## 2024-12-15 ENCOUNTER — OFFICE VISIT (OUTPATIENT)
Dept: URGENT CARE | Facility: CLINIC | Age: 24
End: 2024-12-15
Payer: COMMERCIAL

## 2024-12-15 VITALS
SYSTOLIC BLOOD PRESSURE: 116 MMHG | RESPIRATION RATE: 18 BRPM | TEMPERATURE: 97.7 F | OXYGEN SATURATION: 99 % | HEART RATE: 86 BPM | DIASTOLIC BLOOD PRESSURE: 75 MMHG

## 2024-12-15 DIAGNOSIS — N89.8 VAGINAL ITCHING: ICD-10-CM

## 2024-12-15 DIAGNOSIS — R39.9 UTI SYMPTOMS: Primary | ICD-10-CM

## 2024-12-15 LAB
SL AMB  POCT GLUCOSE, UA: ABNORMAL
SL AMB LEUKOCYTE ESTERASE,UA: ABNORMAL
SL AMB POCT BILIRUBIN,UA: ABNORMAL
SL AMB POCT BLOOD,UA: ABNORMAL
SL AMB POCT CLARITY,UA: CLEAR
SL AMB POCT COLOR,UA: ABNORMAL
SL AMB POCT KETONES,UA: ABNORMAL
SL AMB POCT NITRITE,UA: ABNORMAL
SL AMB POCT PH,UA: 7.5
SL AMB POCT SPECIFIC GRAVITY,UA: 1
SL AMB POCT URINE PROTEIN: ABNORMAL
SL AMB POCT UROBILINOGEN: 0.2

## 2024-12-15 PROCEDURE — 87106 FUNGI IDENTIFICATION YEAST: CPT | Performed by: PHYSICIAN ASSISTANT

## 2024-12-15 PROCEDURE — 81002 URINALYSIS NONAUTO W/O SCOPE: CPT | Performed by: PHYSICIAN ASSISTANT

## 2024-12-15 PROCEDURE — G0383 LEV 4 HOSP TYPE B ED VISIT: HCPCS | Performed by: PHYSICIAN ASSISTANT

## 2024-12-15 PROCEDURE — 87086 URINE CULTURE/COLONY COUNT: CPT | Performed by: PHYSICIAN ASSISTANT

## 2024-12-15 RX ORDER — AMOXICILLIN 500 MG/1
TABLET, FILM COATED ORAL
COMMUNITY
Start: 2024-12-10

## 2024-12-15 NOTE — PROGRESS NOTES
[facility]        NAME: Shyam Rizvi is a 24 y.o. female  : 2000    MRN: [id number]  DATE: December 15, 2024  TIME: 9:23 AM    Assessment and Plan   UTI symptoms [R39.9]  1. UTI symptoms  POCT urine dip    Urine culture      2. Vaginal itching              Patient Instructions   Discussed with patient that she possibly has a yeast infection due to the amoxicillin use.  Because she is 11 weeks pregnant she cannot have a Diflucan.  I recommended that she  over-the-counter Monistat and use that for treatment.  Also since she is already on amoxicillin this would be the antibiotic of choice for a UTI in pregnancy.  We will send her urine for culture.  In the meantime she should finish out the amoxicillin.  Patient should also call OB/GYN first thing tomorrow morning to discuss symptoms.  Should she develop vaginal bleeding, pelvic pain, fevers, severe abdominal pain or back pain, shortness of breath or chest pain she should report to the ER immediately.  If tests have been performed at [facility], our office will contact you with results if changes need to be made to the care plan discussed with you at the visit.  You can review your full results on [facility]  Follow up with PCP in 3-5 days.  Proceed to  ER if symptoms worsen.    Chief Complaint     Chief Complaint   Patient presents with    Possible UTI     Patient here with burning with urination since  when she was in the ER for another issue. She is on antibiotic for tooth removal soon, and she feels since started the antibiotic she not may be getting a yeast infection as well. Patient is currently pregnant.          History of Present Illness       HPI  This is a 24-year-old female here complaining of burning with urination since .  Patient denies urinary urgency or frequency.  She denies abdominal pain, back pain, fever, vomiting or diarrhea, shortness of breath or chest pain.  Patient also notes she has had an  increase in vaginal discharge which is itchy.  This started about 3 days ago.  She states she does not have concern for STDs that she was recently tested.  She is currently 11 weeks pregnant.  She is currently taking amoxicillin for a dental infection for the last 5 days.  Review of Systems   Review of Systems   Respiratory:  Negative for shortness of breath.    Cardiovascular:  Negative for chest pain.   Gastrointestinal:  Negative for diarrhea and vomiting.   Genitourinary:  Positive for dysuria and vaginal discharge. Negative for flank pain, frequency and urgency.         Current Medications       Current Outpatient Medications:     amoxicillin (AMOXIL) 500 MG tablet, TAKE ONE TABLET BY MOUTH EVERY 6 HOURS UNTIL FINISHED, Disp: , Rfl:     acetaminophen (TYLENOL) 650 mg CR tablet, Take 1 tablet (650 mg total) by mouth every 8 (eight) hours as needed for mild pain, Disp: 30 tablet, Rfl: 0    albuterol (ProAir HFA) 90 mcg/act inhaler, Inhale 2 puffs every 6 (six) hours as needed for wheezing, Disp: 18 g, Rfl: 0    ondansetron (ZOFRAN) 4 mg tablet, Take 1 tablet (4 mg total) by mouth every 6 (six) hours, Disp: 20 tablet, Rfl: 0    Prenatal Vit-Fe Fumarate-FA (PRENATAL PO), Take by mouth, Disp: , Rfl:     sertraline (Zoloft) 50 mg tablet, Take 1 tablet (50 mg total) by mouth daily, Disp: 30 tablet, Rfl: 1    Current Allergies     Allergies as of 12/15/2024    (No Known Allergies)            The following portions of the patient's history were reviewed and updated as appropriate: allergies, current medications, past family history, past medical history, past social history, past surgical history and problem list.     Past Medical History:   Diagnosis Date    Anemia     Chlamydia 2019    COVID-19 11/26/2022 2024    Depression     Gonorrhea     2019   tx'd    Migraine     Sickle cell trait (HCC)     Varicella     childhood chickenpox       Past Surgical History:   Procedure Laterality Date    NO PAST SURGERIES          Family History   Problem Relation Age of Onset    Mental illness Mother     Depression Mother     Gout Mother     Sickle cell trait Mother     Sickle cell trait Father     Pulmonary embolism Father     Hypertension Half-Brother     Panic disorder Half-Brother     Depression Half-Brother     Stroke Maternal Grandmother     Diabetes Maternal Grandmother     Alcohol abuse Maternal Grandfather     Cancer Paternal Grandmother     Cancer Paternal Grandfather     Breast cancer Cousin     Schizophrenia Half-Brother     Sickle cell trait Half-Brother     Colon cancer Neg Hx     Ovarian cancer Neg Hx     Uterine cancer Neg Hx     Cervical cancer Neg Hx          Medications have been verified.        Objective   /75   Pulse 86   Temp 97.7 °F (36.5 °C)   Resp 18   LMP 09/15/2024 (Approximate)   SpO2 99%        Physical Exam     Physical Exam  Vitals and nursing note reviewed.   Constitutional:       General: She is not in acute distress.     Appearance: Normal appearance. She is not ill-appearing, toxic-appearing or diaphoretic.   Cardiovascular:      Rate and Rhythm: Normal rate and regular rhythm.   Pulmonary:      Effort: Pulmonary effort is normal.      Breath sounds: Normal breath sounds.   Abdominal:      Palpations: Abdomen is soft.      Tenderness: There is no abdominal tenderness. There is no right CVA tenderness or left CVA tenderness.   Neurological:      Mental Status: She is alert.

## 2024-12-17 ENCOUNTER — RESULTS FOLLOW-UP (OUTPATIENT)
Dept: URGENT CARE | Facility: CLINIC | Age: 24
End: 2024-12-17

## 2024-12-17 LAB — BACTERIA UR CULT: ABNORMAL

## 2024-12-17 NOTE — PATIENT INSTRUCTIONS
Thank you for choosing us for your  care today.  If you have any questions about your ultrasound or care, please do not hesitate to contact us or your primary obstetrician.      The use of low dose aspirin in pregnancy (162mg) is recommended in women with an increased risk for preeclampsia, and was recommended today for you.  When started early in pregnancy (ideally 12-16 weeks but can be started as late as 28 weeks), low dose aspirin can reduce your risk of preeclampsia.  Low dose aspirin has been shown to be safe and without significant maternal or fetal risk.  Side effects are generally none to mild, however, if you experience what you think may be an allergic reaction after starting aspirin, immediately stop it and notify your doctor.  If you have asthma or acid reflux and notice an increase in symptom frequency or severity, consider stopping this medication, and notify your doctor.  If you have had bariatric surgery, you may need a different dose of medication with or without acid-reducing medication.  We advise routine discontinuation of this medication at 36 weeks.  For more resources, visit:  https://mothertobaby.org/fact-sheets/low-dose-aspirin/  https://www.preeclampsia.org/aspirin  https://www.highriskpregnancyinfo.org/preeclampsia     Some general instructions for your pregnancy are:    Exercise: Aim for 150 minutes per week of regular exercise.  Walking is great!  Nutrition: Choose healthy sources of calcium, iron, and protein.  Avoid ultraprocessed foods and added sugar.  Learn about Preeclampsia: preeclampsia is a common, potentially serious high blood pressure complication in pregnancy.  A blood pressure of 140mmHg (systolic or top number) or 90mmHg (diastolic or bottom number) should be evaluated by your doctor.  Aspirin is sometimes prescribed in early pregnancy to prevent preeclampsia in women with risk factors - ask your obstetrician if you should be on this medication.  For more  resources, visit:  https://www.highriskpregnancyinfo.org/preeclampsia  If you smoke, please try to quit completely but also try to reduce your smoking by as much as possible (as soon as possible).  Do not vape.  Please also avoid cannabis products.  Other warning signs to watch out for in pregnancy or postpartum: chest pain, obstructed breathing or shortness of breath, seizures, thoughts of hurting yourself or your baby, bleeding, a painful or swollen leg, fever, or headache (see AWNN POST-BIRTH Warning Signs campaign).  If these happen call 911.  Itching is also not normal in pregnancy and if you experience this, especially over your hands and feet, potentially worse at night, notify your doctors.

## 2024-12-19 ENCOUNTER — APPOINTMENT (OUTPATIENT)
Dept: LAB | Facility: HOSPITAL | Age: 24
End: 2024-12-19
Payer: COMMERCIAL

## 2024-12-19 ENCOUNTER — ROUTINE PRENATAL (OUTPATIENT)
Dept: PERINATAL CARE | Facility: OTHER | Age: 24
End: 2024-12-19
Payer: COMMERCIAL

## 2024-12-19 ENCOUNTER — APPOINTMENT (OUTPATIENT)
Dept: LAB | Facility: CLINIC | Age: 24
End: 2024-12-19
Payer: COMMERCIAL

## 2024-12-19 VITALS
WEIGHT: 155.8 LBS | HEART RATE: 90 BPM | SYSTOLIC BLOOD PRESSURE: 100 MMHG | HEIGHT: 65 IN | BODY MASS INDEX: 25.96 KG/M2 | DIASTOLIC BLOOD PRESSURE: 70 MMHG

## 2024-12-19 DIAGNOSIS — Z3A.12 12 WEEKS GESTATION OF PREGNANCY: Primary | ICD-10-CM

## 2024-12-19 DIAGNOSIS — Z36.82 ENCOUNTER FOR ANTENATAL SCREENING FOR NUCHAL TRANSLUCENCY: ICD-10-CM

## 2024-12-19 DIAGNOSIS — Z82.49 FAMILY HISTORY OF DVT: ICD-10-CM

## 2024-12-19 DIAGNOSIS — Z34.81 PRENATAL CARE, SUBSEQUENT PREGNANCY, FIRST TRIMESTER: ICD-10-CM

## 2024-12-19 DIAGNOSIS — O09.899 HISTORY OF PRETERM DELIVERY, CURRENTLY PREGNANT: ICD-10-CM

## 2024-12-19 DIAGNOSIS — D57.3 SICKLE CELL TRAIT IN MOTHER AFFECTING PREGNANCY (HCC): ICD-10-CM

## 2024-12-19 DIAGNOSIS — N91.1 SECONDARY AMENORRHEA: ICD-10-CM

## 2024-12-19 DIAGNOSIS — Z3A.12 12 WEEKS GESTATION OF PREGNANCY: ICD-10-CM

## 2024-12-19 DIAGNOSIS — O21.9 NAUSEA AND VOMITING DURING PREGNANCY PRIOR TO 22 WEEKS GESTATION: ICD-10-CM

## 2024-12-19 DIAGNOSIS — O99.019 SICKLE CELL TRAIT IN MOTHER AFFECTING PREGNANCY (HCC): ICD-10-CM

## 2024-12-19 DIAGNOSIS — O09.899 HISTORY OF PRETERM DELIVERY, CURRENTLY PREGNANT: Primary | ICD-10-CM

## 2024-12-19 LAB
ABO GROUP BLD: NORMAL
BACTERIA UR QL AUTO: NORMAL /HPF
BASOPHILS # BLD AUTO: 0.02 THOUSANDS/ΜL (ref 0–0.1)
BASOPHILS NFR BLD AUTO: 0 % (ref 0–1)
BILIRUB UR QL STRIP: NEGATIVE
BLD GP AB SCN SERPL QL: NEGATIVE
CLARITY UR: CLEAR
COLOR UR: COLORLESS
EOSINOPHIL # BLD AUTO: 0.15 THOUSAND/ΜL (ref 0–0.61)
EOSINOPHIL NFR BLD AUTO: 2 % (ref 0–6)
ERYTHROCYTE [DISTWIDTH] IN BLOOD BY AUTOMATED COUNT: 13.3 % (ref 11.6–15.1)
GLUCOSE UR STRIP-MCNC: NEGATIVE MG/DL
HBV SURFACE AG SER QL: NORMAL
HCT VFR BLD AUTO: 37.2 % (ref 34.8–46.1)
HCV AB SER QL: NORMAL
HGB BLD-MCNC: 11.7 G/DL (ref 11.5–15.4)
HGB UR QL STRIP.AUTO: NEGATIVE
HIV 1+2 AB+HIV1 P24 AG SERPL QL IA: NORMAL
HIV 2 AB SERPL QL IA: NORMAL
HIV1 AB SERPL QL IA: NORMAL
HIV1 P24 AG SERPL QL IA: NORMAL
IMM GRANULOCYTES # BLD AUTO: 0.03 THOUSAND/UL (ref 0–0.2)
IMM GRANULOCYTES NFR BLD AUTO: 0 % (ref 0–2)
KETONES UR STRIP-MCNC: NEGATIVE MG/DL
LEUKOCYTE ESTERASE UR QL STRIP: NEGATIVE
LYMPHOCYTES # BLD AUTO: 1.46 THOUSANDS/ΜL (ref 0.6–4.47)
LYMPHOCYTES NFR BLD AUTO: 18 % (ref 14–44)
MCH RBC QN AUTO: 26.8 PG (ref 26.8–34.3)
MCHC RBC AUTO-ENTMCNC: 31.5 G/DL (ref 31.4–37.4)
MCV RBC AUTO: 85 FL (ref 82–98)
MONOCYTES # BLD AUTO: 0.4 THOUSAND/ΜL (ref 0.17–1.22)
MONOCYTES NFR BLD AUTO: 5 % (ref 4–12)
NEUTROPHILS # BLD AUTO: 5.98 THOUSANDS/ΜL (ref 1.85–7.62)
NEUTS SEG NFR BLD AUTO: 75 % (ref 43–75)
NITRITE UR QL STRIP: NEGATIVE
NON-SQ EPI CELLS URNS QL MICRO: NORMAL /HPF
NRBC BLD AUTO-RTO: 0 /100 WBCS
PH UR STRIP.AUTO: 7 [PH]
PLATELET # BLD AUTO: 346 THOUSANDS/UL (ref 149–390)
PMV BLD AUTO: 10.3 FL (ref 8.9–12.7)
PROT UR STRIP-MCNC: NEGATIVE MG/DL
RBC # BLD AUTO: 4.36 MILLION/UL (ref 3.81–5.12)
RBC #/AREA URNS AUTO: NORMAL /HPF
RH BLD: POSITIVE
RUBV IGG SERPL IA-ACNC: 35.3 IU/ML
SP GR UR STRIP.AUTO: 1.01 (ref 1–1.03)
TREPONEMA PALLIDUM IGG+IGM AB [PRESENCE] IN SERUM OR PLASMA BY IMMUNOASSAY: NORMAL
UROBILINOGEN UR STRIP-ACNC: <2 MG/DL
WBC # BLD AUTO: 8.04 THOUSAND/UL (ref 4.31–10.16)
WBC #/AREA URNS AUTO: NORMAL /HPF

## 2024-12-19 PROCEDURE — 76813 OB US NUCHAL MEAS 1 GEST: CPT | Performed by: OBSTETRICS & GYNECOLOGY

## 2024-12-19 PROCEDURE — 85613 RUSSELL VIPER VENOM DILUTED: CPT

## 2024-12-19 PROCEDURE — 85306 CLOT INHIBIT PROT S FREE: CPT

## 2024-12-19 PROCEDURE — 86147 CARDIOLIPIN ANTIBODY EA IG: CPT

## 2024-12-19 PROCEDURE — 86900 BLOOD TYPING SEROLOGIC ABO: CPT

## 2024-12-19 PROCEDURE — 36415 COLL VENOUS BLD VENIPUNCTURE: CPT

## 2024-12-19 PROCEDURE — 86850 RBC ANTIBODY SCREEN: CPT

## 2024-12-19 PROCEDURE — 81001 URINALYSIS AUTO W/SCOPE: CPT

## 2024-12-19 PROCEDURE — 87077 CULTURE AEROBIC IDENTIFY: CPT

## 2024-12-19 PROCEDURE — 85301 ANTITHROMBIN III ANTIGEN: CPT

## 2024-12-19 PROCEDURE — 85302 CLOT INHIBIT PROT C ANTIGEN: CPT

## 2024-12-19 PROCEDURE — 85025 COMPLETE CBC W/AUTO DIFF WBC: CPT

## 2024-12-19 PROCEDURE — 87086 URINE CULTURE/COLONY COUNT: CPT

## 2024-12-19 PROCEDURE — 99243 OFF/OP CNSLTJ NEW/EST LOW 30: CPT | Performed by: OBSTETRICS & GYNECOLOGY

## 2024-12-19 PROCEDURE — 86901 BLOOD TYPING SEROLOGIC RH(D): CPT

## 2024-12-19 PROCEDURE — 87340 HEPATITIS B SURFACE AG IA: CPT

## 2024-12-19 PROCEDURE — 83020 HEMOGLOBIN ELECTROPHORESIS: CPT

## 2024-12-19 PROCEDURE — 86780 TREPONEMA PALLIDUM: CPT

## 2024-12-19 PROCEDURE — 86803 HEPATITIS C AB TEST: CPT

## 2024-12-19 PROCEDURE — 87389 HIV-1 AG W/HIV-1&-2 AB AG IA: CPT

## 2024-12-19 PROCEDURE — 86146 BETA-2 GLYCOPROTEIN ANTIBODY: CPT

## 2024-12-19 PROCEDURE — 86762 RUBELLA ANTIBODY: CPT

## 2024-12-19 RX ORDER — ASPIRIN 81 MG/1
162 TABLET ORAL DAILY
Qty: 60 TABLET | Refills: 3 | Status: SHIPPED | OUTPATIENT
Start: 2024-12-19

## 2024-12-19 RX ORDER — ONDANSETRON 4 MG/1
4 TABLET, ORALLY DISINTEGRATING ORAL EVERY 8 HOURS PRN
Qty: 30 TABLET | Refills: 1 | Status: SHIPPED | OUTPATIENT
Start: 2024-12-19 | End: 2025-01-18

## 2024-12-19 NOTE — LETTER
"   Date: 2024    Michaela Rangel PA-C  [address] [place] [address]  Jim CERVANTES [id number]    Patient: Shyam Rizvi   YOB: 2000   Date of Visit: 2024   Gestational age 12w1d   Nature of this communication: Routine       This patient was seen recently in our  office.  Please see ultrasound report under \"OB Procedures\" tab.  Please don't hesitate to contact our office with any concerns or questions.      Sincerely,      Alisha Au MD  Attending Physician, Maternal-Fetal Medicine  [facility]      " Ambulatory Care Manager called above number with Nauruan  Mary.  No answer, left message to call back.  Follow up #1    Follow up regarding:  • 15 month check up - need to schedule  • NICU follow up - appointment reminder for 08-  • Nutrition - seen on 03-.  Next appointment scheduled for 04-  • WIC - new formula received?  Tolerating?  • Ophthalmology - appointment scheduled?  • COVID Booster dose - need to complete  • Pulmonology - seen on 04-.  Next appointment scheduled for 10-  • Speech Therapy with [facility] - seen on 02-, 02-, 03-, 03-.  To transition to EI Speech Therapy  • Video Swallow Study - completed on 02-  • Speech Therapy with [facility]  • Synagis - received 02-    Routed to Dr. Julito Su as NOHEMYI.

## 2024-12-19 NOTE — PROGRESS NOTES
Patient chose to have [facility] [id number] Non-Invasive Prenatal Screen [id number] PLUS w/ SCA, WITH fetal sex.  Patient choose to be billed through insurance.     Patient given brochure and is aware [facility] will contact patient's insurance and coordinate coverage.  Provided [facility] contact information. General inquiries [phone], Cost estimates [phone] and [facility] Billing [phone]. Website [email or web address].     Blood collection tubes labeled with patient identifiers (name, medical record number, and date of birth).     Filled out [facility] order form. Patient chose to be sent to an outpatient lab to complete blood work. .      If patient chose to have blood work drawn at a [facility] lab we requested patient notify MFM (via phone call or [employer] message) when blood collected so office can follow up on results.       Maternal Fetal Medicine will have results in approximately 5-7 business days and will call patient or notify via [employer].  Patient aware viewing lab result online will reveal fetal sex if ordered.    Patient verbalized understanding of all instructions and no questions at this time.

## 2024-12-19 NOTE — PROGRESS NOTES
Ultrasound Probe Disinfection    A transvaginal ultrasound was performed.   Prior to use, disinfection was performed with High Level Disinfection Process ([email or web address]).  Probe serial number M1: [id number]   was used.    Skylar Espinoza  12/19/24  11:27 AM

## 2024-12-20 ENCOUNTER — RESULTS FOLLOW-UP (OUTPATIENT)
Dept: OBGYN CLINIC | Facility: CLINIC | Age: 24
End: 2024-12-20

## 2024-12-20 DIAGNOSIS — O23.41 URINARY TRACT INFECTION IN MOTHER DURING FIRST TRIMESTER OF PREGNANCY: Primary | ICD-10-CM

## 2024-12-20 LAB
AT III AG ACT/NOR PPP IA: 82 % (ref 72–124)
BACTERIA UR CULT: ABNORMAL
CARDIOLIPIN IGA SER IA-ACNC: 2.5
CARDIOLIPIN IGG SER IA-ACNC: 14
CARDIOLIPIN IGM SER IA-ACNC: 2.7

## 2024-12-20 RX ORDER — NITROFURANTOIN 25; 75 MG/1; MG/1
100 CAPSULE ORAL 2 TIMES DAILY
Qty: 10 CAPSULE | Refills: 0 | Status: SHIPPED | OUTPATIENT
Start: 2024-12-20 | End: 2024-12-25

## 2024-12-20 NOTE — PROGRESS NOTES
"[facility]: Ms. Rizvi was seen today for nuchal translucency ultrasound.  See ultrasound report under \"OB Procedures\" tab.      MDM:   I. Diagnoses/Problems addressed:  Acute uncomplicated illness/injury: nausea/vomiting (ex. GDMA1, MO, UTI)  II.  Data: I ordered the following tests: NIPS.  III.  Risk of morbidity: Moderate (Rx management)    Please don't hesitate to contact our office with any concerns or questions.  -Alisha Au MD      "

## 2024-12-21 LAB
HGB A MFR BLD: 2.4 % (ref 1.8–3.2)
HGB A MFR BLD: 97.6 % (ref 96.4–98.8)
HGB F MFR BLD: 0 % (ref 0–2)
HGB FRACT BLD-IMP: NORMAL
HGB S MFR BLD: 0 %
LA PPP-IMP: NORMAL
PROT C AG PPP IA-ACNC: 97 % (ref 60–150)
SCREEN APTT: 35.2 SEC (ref 0–43.5)
SCREEN DRVVT: 38.4 SEC (ref 0–47)

## 2024-12-23 LAB — PROT S ACT/NOR PPP: 44 % (ref 68–108)

## 2024-12-31 PROBLEM — O99.119 PROTEIN S DEFICIENCY AFFECTING PREGNANCY (HCC): Status: ACTIVE | Noted: 2024-12-31

## 2024-12-31 PROBLEM — D68.59 PROTEIN S DEFICIENCY AFFECTING PREGNANCY (HCC): Status: ACTIVE | Noted: 2024-12-31

## 2025-01-03 LAB
B2 GLYCOPROT1 IGA SERPL IA-ACNC: 1.4
B2 GLYCOPROT1 IGG SERPL IA-ACNC: 3
B2 GLYCOPROT1 IGM SERPL IA-ACNC: <2.4

## 2025-01-06 PROBLEM — Z3A.14 14 WEEKS GESTATION OF PREGNANCY: Status: ACTIVE | Noted: 2024-12-04

## 2025-01-06 PROBLEM — Z34.90 SUPERVISION OF NORMAL PREGNANCY: Status: ACTIVE | Noted: 2025-01-06

## 2025-01-07 ENCOUNTER — ROUTINE PRENATAL (OUTPATIENT)
Dept: OBGYN CLINIC | Facility: MEDICAL CENTER | Age: 25
End: 2025-01-07
Payer: COMMERCIAL

## 2025-01-07 ENCOUNTER — NURSE TRIAGE (OUTPATIENT)
Age: 25
End: 2025-01-07

## 2025-01-07 VITALS
HEART RATE: 60 BPM | SYSTOLIC BLOOD PRESSURE: 110 MMHG | BODY MASS INDEX: 26.29 KG/M2 | DIASTOLIC BLOOD PRESSURE: 72 MMHG | OXYGEN SATURATION: 99 % | WEIGHT: 158 LBS

## 2025-01-07 DIAGNOSIS — D68.59 PROTEIN S DEFICIENCY AFFECTING PREGNANCY (HCC): ICD-10-CM

## 2025-01-07 DIAGNOSIS — O09.899 HISTORY OF PRETERM DELIVERY, CURRENTLY PREGNANT: ICD-10-CM

## 2025-01-07 DIAGNOSIS — D57.3 SICKLE CELL TRAIT IN MOTHER AFFECTING PREGNANCY (HCC): ICD-10-CM

## 2025-01-07 DIAGNOSIS — O98.511 COVID-19 AFFECTING PREGNANCY IN FIRST TRIMESTER: ICD-10-CM

## 2025-01-07 DIAGNOSIS — O21.9 NAUSEA AND VOMITING DURING PREGNANCY PRIOR TO 22 WEEKS GESTATION: ICD-10-CM

## 2025-01-07 DIAGNOSIS — U07.1 COVID-19 AFFECTING PREGNANCY IN FIRST TRIMESTER: ICD-10-CM

## 2025-01-07 DIAGNOSIS — Z34.82 ENCOUNTER FOR SUPERVISION OF OTHER NORMAL PREGNANCY IN SECOND TRIMESTER: Primary | ICD-10-CM

## 2025-01-07 DIAGNOSIS — O99.341 DEPRESSION DURING PREGNANCY IN FIRST TRIMESTER: ICD-10-CM

## 2025-01-07 DIAGNOSIS — Z3A.14 14 WEEKS GESTATION OF PREGNANCY: ICD-10-CM

## 2025-01-07 DIAGNOSIS — O99.119 PROTEIN S DEFICIENCY AFFECTING PREGNANCY (HCC): ICD-10-CM

## 2025-01-07 DIAGNOSIS — F32.A DEPRESSION DURING PREGNANCY IN FIRST TRIMESTER: ICD-10-CM

## 2025-01-07 DIAGNOSIS — O99.019 SICKLE CELL TRAIT IN MOTHER AFFECTING PREGNANCY (HCC): ICD-10-CM

## 2025-01-07 DIAGNOSIS — Z36.9 ANTENATAL SCREENING ENCOUNTER: ICD-10-CM

## 2025-01-07 PROBLEM — N89.8 VAGINAL ITCHING: Status: RESOLVED | Noted: 2024-12-15 | Resolved: 2025-01-07

## 2025-01-07 LAB
SL AMB  POCT GLUCOSE, UA: NORMAL
SL AMB POCT URINE PROTEIN: NORMAL

## 2025-01-07 PROCEDURE — 81002 URINALYSIS NONAUTO W/O SCOPE: CPT | Performed by: NURSE PRACTITIONER

## 2025-01-07 PROCEDURE — PNV: Performed by: NURSE PRACTITIONER

## 2025-01-07 NOTE — PROGRESS NOTES
Problem   Protein S Deficiency Affecting Pregnancy (Hcc)    Protein S level low with PNL- 44  Recheck in postpartum     Nausea and Vomiting During Pregnancy Prior to 22 Weeks Gestation   Sickle Cell Trait in Mother Affecting Pregnancy (Hcc)    Genetic counseling done 24, summary of rec:  1) Patient declined CVS, amniocentesis, expanded carrier screening, and [place] single gene NIPT; patient's partner declined hemoglobinopathy screening.  2) CF, SMA, and hemoglobinopathy carrier screening orders already placed in Epic by OB provider.  3) NT ultrasound scheduled on 2024 at [facility] site.       Covid-19 Affecting Pregnancy in First Trimester   14 Weeks Gestation of Pregnancy   History of  Delivery, Currently Pregnant    Prior PTD at 35w5d due to PPROM.              Depression   Vaginal Itching (Resolved)     14 weeks gestation of pregnancy  Shyam Rizvi is a 24 y.o.  here for OB visit at 14w6d weeks. TWG  0.454 kg (1 lb).  No health concerns.     Denies LOF, vaginal bleeding or contractions.  Compliant with  mg po daily until 36 weeks.   AFP ordered and aware of timing recommendations.   Continue daily PNV  Influenza vaccine  refused. Aware of recommendations.   [facility] appt 25  RTO in 4 weeks.    Sickle cell trait in mother affecting pregnancy (HCC)  Normal HgB electrophoresis  [facility] appt  25    Depression  Negative 2 question depression screen  Compliant with zoloft 50 mg po daily.     Protein S deficiency affecting pregnancy (HCC)  Protein S level low with PNL- 44  Recheck in postpartum    COVID-19 affecting pregnancy in first trimester  Fully recovered and asymptomatic.        History of  delivery, currently pregnant  Denies cramping  or vaginal bleeding.     Nausea and vomiting during pregnancy prior to 22 weeks gestation  Admits to occ nausea without vomiting.   Tolerable.   Able to eat 3 meals plus snacks per day.

## 2025-01-07 NOTE — TELEPHONE ENCOUNTER
Regarding: Results  ----- Message from Skylar PLUMMER sent at 1/7/2025 12:29 PM EST -----  Pt called about going over results from labs done on 12/19/24. Informed pt provider has to see results first and provider or nurse will call her back to go over them. Pt was waiting on Chromosome labs and wanted for someone to please call her back to go over them. No CTS currently available please call pt back when able too.

## 2025-01-07 NOTE — TELEPHONE ENCOUNTER
Warm wayne spoke with Bennie - she confirmed she had her [id number] drawn 12/19/24 at [facility]. I sent message to [facility] contact and I was unable to see the order/result in the portal.   Will follow up and call patient back. Shyam verbalized understanding and had no further questions at this time.

## 2025-01-07 NOTE — ASSESSMENT & PLAN NOTE
Shyam Rizvi is a 24 y.o.  here for OB visit at 14w6d weeks. TWG  0.454 kg (1 lb).  No health concerns.     Denies LOF, vaginal bleeding or contractions.  Compliant with  mg po daily until 36 weeks.   AFP ordered and aware of timing recommendations.   Continue daily PNV  Influenza vaccine  refused. Aware of recommendations.   MFM appt 25  RTO in 4 weeks.

## 2025-01-07 NOTE — PATIENT INSTRUCTIONS
Part of routine screening in pregnancy is a maternal AFP level, which is a blood test that should be collected between 16-18 weeks of pregnancy, but can be collected up to 20weeks. We have placed an order for this lab in your chart. If you can use the [facility]'s lab, you can report to the lab and they will be able to access the order. If you need to use an outside lab, please contact the office for a copy of the lab slip.      Alpha-fetoprotein (AFP) is a protein produced in the liver of a developing fetus. During a baby's development, some AFP passes through the placenta and into the mother's blood. An AFP test measures the level of AFP in pregnant women during the second trimester of pregnancy. Abnormal levels of AFP in a mother's blood may be sign of a neural tube defect, a condition that causes abnormal development of a developing baby's brain and/or spine.      Please contact the office at [phone] with any questions.

## 2025-01-07 NOTE — TELEPHONE ENCOUNTER
Reason for Disposition  • Requesting lab results and adult stable (no new symptoms, not getting worse)     Warm transfer to [facility] clinical    Additional Information  • Caller is not with the adult (patient) and reporting urgent symptoms    Answer Assessment - Initial Assessment Questions  Bennie calling to obtain chromosome results from 12/19/2024.      Per review of chart, result is not available.   Contacted Cristina at [facility] clinical who will provide further assistance.    Protocols used: Information Only Call - No Triage-Adult-[place]

## 2025-01-08 ENCOUNTER — TELEPHONE (OUTPATIENT)
Facility: HOSPITAL | Age: 25
End: 2025-01-08

## 2025-01-08 NOTE — TELEPHONE ENCOUNTER
Patient returning call. Reviewed message per Cassandra. Patient would like to  test kit and go to OP lab for draw. She states she will not be able to  kit and will have her father Chavo Rizvi pick it up. Message to office clerical pool.

## 2025-01-08 NOTE — TELEPHONE ENCOUNTER
Pt called [facility] to follow up on [facility] specimen that was drawn on 12/19/24.  States she called yesterday and was looking for an update.  Informed pt I would follow up and return call to discuss after investigation.    Spoke with Roberta ERWIN, VIVEK to inquire as she started investigation yesterday.  Roberta states they have not located the specimen as of yet.     Left [facility] for Shyam to inform her that at this time the specimen has not been located and we recommend a re-draw of the specimen. Pt encouraged to contact our office to schedule an appointment for a re-draw or to  a kit/new order and have the blood drawn at a [facility] outpatient lab or [facility].  Office # of [phone]

## 2025-01-09 ENCOUNTER — TELEPHONE (OUTPATIENT)
Dept: PERINATAL CARE | Facility: OTHER | Age: 25
End: 2025-01-09

## 2025-01-09 NOTE — TELEPHONE ENCOUNTER
Patients father, Chavo, came 1/9/2025 at 1550 to  patients NIP kit for #[id number]. Gave father, Chavo, the lab slip and provided information that patient is to go to a [facility] lab to have her blood drawn. Chavo was also instructed to have patient answer the 6 questions on the order form and to sign and date the order slip.

## 2025-01-09 NOTE — TELEPHONE ENCOUNTER
Spoke with patient 1/9/2025 and expressed my apology for her father coming on a day that the [place] office was closed. Patient will have her father, Chavo, come in for the kit today, 1/9/2025.

## 2025-01-10 ENCOUNTER — APPOINTMENT (OUTPATIENT)
Dept: LAB | Facility: CLINIC | Age: 25
End: 2025-01-10
Payer: COMMERCIAL

## 2025-01-10 DIAGNOSIS — Z36.9 ANTENATAL SCREENING ENCOUNTER: ICD-10-CM

## 2025-01-10 PROCEDURE — 36415 COLL VENOUS BLD VENIPUNCTURE: CPT

## 2025-01-12 PROBLEM — O09.292 HISTORY OF PRETERM PREMATURE RUPTURE OF MEMBRANES (PROM) IN PREVIOUS PREGNANCY, CURRENTLY PREGNANT IN SECOND TRIMESTER: Status: ACTIVE | Noted: 2025-01-12

## 2025-01-12 PROBLEM — Z3A.16 16 WEEKS GESTATION OF PREGNANCY: Status: ACTIVE | Noted: 2025-01-12

## 2025-01-14 NOTE — PATIENT INSTRUCTIONS
Thank you for choosing us for your  care today.  If you have any questions about your ultrasound or care, please do not hesitate to contact us or your primary obstetrician.        Some general instructions for your pregnancy are:    Exercise: Aim for 150 minutes per week of regular exercise.  Walking is great!  Nutrition: Choose healthy sources of calcium, iron, and protein.  Avoid ultraprocessed foods and added sugar.  Learn about Preeclampsia: preeclampsia is a common, potentially serious high blood pressure complication in pregnancy.  A blood pressure of 140mmHg (systolic or top number) or 90mmHg (diastolic or bottom number) should be evaluated by your doctor.  Aspirin is sometimes prescribed in early pregnancy to prevent preeclampsia in women with risk factors - ask your obstetrician if you should be on this medication.  For more resources, visit:  https://www.highriskpregnancyinfo.[email or web address]  If you smoke, please try to quit completely but also try to reduce your smoking by as much as possible (as soon as possible).  Do not vape.  Please also avoid cannabis products.  Other warning signs to watch out for in pregnancy or postpartum: chest pain, obstructed breathing or shortness of breath, seizures, thoughts of hurting yourself or your baby, bleeding, a painful or swollen leg, fever, or headache (see [facility] Warning Sign[facility]).  If these happen call 911.  Itching is also not normal in pregnancy and if you experience this, especially over your hands and feet, potentially worse at night, notify your doctors.     Kick Counts in Pregnancy   AMBULATORY CARE:   Kick counts  measure how much your baby is moving in your womb. A kick from your baby can be felt as a twist, turn, swish, roll, or jab. It is common to feel your baby kicking at 26 to 28 weeks of pregnancy. You may feel your baby kick as early as 20 weeks of pregnancy. You may want to start counting at 28 weeks.   Contact your  doctor immediately if:   You feel a change in the number of kicks or movements of your baby.      You feel fewer than 10 kicks within 2 hours.      You have questions or concerns about your baby's movements.     Why measure kick counts:  Your baby's movement may provide information about your baby's health. He or she may move less, or not at all, if there are problems. Your baby may move less if he or she is not getting enough oxygen or nutrition from the placenta. Do not smoke while you are pregnant. Smoking decreases the amount of oxygen that gets to your baby. Talk to your healthcare provider if you need help to quit smoking. Tell your healthcare provider as soon as you feel a change in your baby's movements.  When to measure kick counts:   Measure kick counts at the same time every day.       Measure kick counts when your baby is awake and most active. Your baby may be most active in the evening.     How to measure kick counts:  Check that your baby is awake before you measure kick counts. You can wake up your baby by lightly pushing on your belly, walking, or drinking something cold. Your healthcare provider may tell you different ways to measure kick counts. You may be told to do the following:  Use a chart or clock to keep track of the time you start and finish counting.      Sit in a chair or lie on your left side.      Place your hands on the largest part of your belly.      Count until you reach 10 kicks. Write down how much time it takes to count 10 kicks.      It may take 30 minutes to 2 hours to count 10 kicks. It should not take more than 2 hours to count 10 kicks.     Follow up with your doctor as directed:  Write down your questions so you remember to ask them during your visits.   © Copyright [employer] 2023 Information is for End User's use only and may not be sold, redistributed or otherwise used for commercial purposes.  The above information is an  only. It is not intended as  medical advice for individual conditions or treatments. Talk to your doctor, nurse or pharmacist before following any medical regimen to see if it is safe and effective for you. Nonstress Test for Pregnancy   WHAT YOU NEED TO KNOW:   What do I need to know about a nonstress test?  A nonstress test measures your baby's heart rate and movements. Nonstress means that no stress will be placed on your baby during the test.  How do I prepare for a nonstress test?  Your healthcare provider will talk to you about how to prepare for this test. Your provider may tell you to eat and drink plenty of liquids before your test. If you smoke, you may be asked not to smoke within 2 hours before the test. Your provider will also tell you which medicines to take or not take on the day of your test.  What will happen during a nonstress test?  You may be asked to lie down or recline back for the test on a bed. One or 2 belts with sensors will be placed around your abdomen. Your baby's heart rate will be recorded with a machine. If your baby does not move, your baby may be asleep. Your healthcare provider may make a noise near your abdomen to try to wake your baby. The test usually takes about 20 minutes, but can take longer if your baby needs to be awakened.        What do I need to know about the test results?  Your baby will be expected to move at least 2 times for a certain amount of time. Your baby's heart rate will be expected to go up by a certain number of beats per minute during movement. If your baby does not move as expected, the test may need to be repeated or you may need other tests.  CARE AGREEMENT:   You have the right to help plan your care. Learn about your health condition and how it may be treated. Discuss treatment options with your healthcare providers to decide what care you want to receive. You always have the right to refuse treatment. The above information is an  only. It is not intended as  medical advice for individual conditions or treatments. Talk to your doctor, nurse or pharmacist before following any medical regimen to see if it is safe and effective for you.  © Copyright [employer] 2023 Information is for End User's use only and may not be sold, redistributed or otherwise used for commercial purposes.

## 2025-01-15 ENCOUNTER — RESULTS FOLLOW-UP (OUTPATIENT)
Dept: PERINATAL CARE | Facility: OTHER | Age: 25
End: 2025-01-15

## 2025-01-15 LAB
CFDNA.FET/CFDNA.TOTAL SFR FETUS: NORMAL %
CITATION REF LAB TEST: NORMAL
FET 13+18+21+X+Y ANEUP PLAS.CFDNA: NEGATIVE
FET CHR 21 TS PLAS.CFDNA QL: NEGATIVE
FET CHR 21 TS PLAS.CFDNA QL: NEGATIVE
FET MS X RISK WBC.DNA+CFDNA QL: NOT DETECTED
FET SEX PLAS.CFDNA DOSAGE CFDNA: NORMAL
FET TS 13 RISK PLAS.CFDNA QL: NEGATIVE
FET X + Y ANEUP RISK PLAS.CFDNA SEQ-IMP: NOT DETECTED
GA EST FROM CONCEPTION DATE: NORMAL D
GESTATIONAL AGE > 9:: YES
LAB DIRECTOR NAME PROVIDER: NORMAL
LAB DIRECTOR NAME PROVIDER: NORMAL
LABORATORY COMMENT REPORT: NORMAL
LIMITATIONS OF THE TEST: NORMAL
NEGATIVE PREDICTIVE VALUE: NORMAL
PERFORMANCE CHARACTERISTICS: NORMAL
POSITIVE PREDICTIVE VALUE: NORMAL
REF LAB TEST METHOD: NORMAL
SL AMB NOTE:: NORMAL
TEST PERFORMANCE INFO SPEC: NORMAL

## 2025-01-16 ENCOUNTER — ROUTINE PRENATAL (OUTPATIENT)
Dept: PERINATAL CARE | Facility: OTHER | Age: 25
End: 2025-01-16
Payer: COMMERCIAL

## 2025-01-16 ENCOUNTER — TELEPHONE (OUTPATIENT)
Dept: OBGYN CLINIC | Facility: CLINIC | Age: 25
End: 2025-01-16

## 2025-01-16 VITALS
BODY MASS INDEX: 26.16 KG/M2 | WEIGHT: 157 LBS | HEIGHT: 65 IN | HEART RATE: 80 BPM | SYSTOLIC BLOOD PRESSURE: 108 MMHG | DIASTOLIC BLOOD PRESSURE: 60 MMHG

## 2025-01-16 DIAGNOSIS — Z36.86 ENCOUNTER FOR ANTENATAL SCREENING FOR CERVICAL LENGTH: ICD-10-CM

## 2025-01-16 DIAGNOSIS — F32.9 REACTIVE DEPRESSION: ICD-10-CM

## 2025-01-16 DIAGNOSIS — O09.292 HISTORY OF PRETERM PREMATURE RUPTURE OF MEMBRANES (PROM) IN PREVIOUS PREGNANCY, CURRENTLY PREGNANT IN SECOND TRIMESTER: ICD-10-CM

## 2025-01-16 DIAGNOSIS — Z3A.16 16 WEEKS GESTATION OF PREGNANCY: Primary | ICD-10-CM

## 2025-01-16 DIAGNOSIS — O09.899 HISTORY OF PRETERM DELIVERY, CURRENTLY PREGNANT: ICD-10-CM

## 2025-01-16 PROCEDURE — 99213 OFFICE O/P EST LOW 20 MIN: CPT | Performed by: OBSTETRICS & GYNECOLOGY

## 2025-01-16 PROCEDURE — 76817 TRANSVAGINAL US OBSTETRIC: CPT | Performed by: OBSTETRICS & GYNECOLOGY

## 2025-01-16 PROCEDURE — 76815 OB US LIMITED FETUS(S): CPT | Performed by: OBSTETRICS & GYNECOLOGY

## 2025-01-16 NOTE — TELEPHONE ENCOUNTER
----- Message from Zhane YOUNG sent at 2024  7:22 AM EST -----  Regardinnd Trimester Call Due  1/15-

## 2025-01-16 NOTE — PROGRESS NOTES
Ultrasound Probe Disinfection    A transvaginal ultrasound was performed.   Prior to use, disinfection was performed with High Level Disinfection Process ([employer]).  Probe serial number M1: [id number]   was used.    Skylar Espinoza  01/16/25  10:17 AM

## 2025-01-16 NOTE — TELEPHONE ENCOUNTER
Reason for call:   [x] Refill   [] Prior Auth  [] Other:     Office:   [x] PCP/Provider -  TREY Aguillon   [] Specialty/Provider -     Medication: sertraline (Zoloft) 50 mg tablet    Dose/Frequency: Take 1 tablet (50 mg total) by mouth daily     Quantity: 30    Pharmacy: [id number] [place], [place] - [address]     Does the patient have enough for 3 days?   [] Yes   [x] No - Send as HP to POD

## 2025-01-16 NOTE — PROGRESS NOTES
"[facility]: Shyam Rizvi was seen today for serial cervical length screening ultrasound.  See ultrasound report under \"OB Procedures\" tab.   The time spent on this established patient on the encounter date included 5 minutes previsit service time reviewing records and precharting, 5 minutes face-to-face service time counseling regarding results and coordinating care, and  5 minutes charting, totalling 15 minutes.  Please don't hesitate to contact our office with any concerns or questions.  -Sanjana Ingram MD    "

## 2025-01-21 ENCOUNTER — TELEMEDICINE (OUTPATIENT)
Dept: OTHER | Facility: HOSPITAL | Age: 25
End: 2025-01-21
Payer: COMMERCIAL

## 2025-01-21 VITALS — TEMPERATURE: 97.6 F

## 2025-01-21 DIAGNOSIS — H10.32 ACUTE BACTERIAL CONJUNCTIVITIS OF LEFT EYE: Primary | ICD-10-CM

## 2025-01-21 PROBLEM — Z00.00 ANNUAL PHYSICAL EXAM: Status: RESOLVED | Noted: 2024-08-06 | Resolved: 2025-01-21

## 2025-01-21 PROCEDURE — 99213 OFFICE O/P EST LOW 20 MIN: CPT | Performed by: PHYSICIAN ASSISTANT

## 2025-01-21 RX ORDER — POLYMYXIN B SULFATE AND TRIMETHOPRIM 1; 10000 MG/ML; [USP'U]/ML
1 SOLUTION OPHTHALMIC EVERY 4 HOURS
Qty: 5 ML | Refills: 0 | Status: SHIPPED | OUTPATIENT
Start: 2025-01-21 | End: 2025-01-28

## 2025-01-21 NOTE — PATIENT INSTRUCTIONS
"Care Anywhere Phone number is [phone] if you need assistance or have further questions Thank you for choosing to trust [facility] with your care today. Virtual visits are very convenient, but do have some limitations. Schedule a follow-up appointment with your primary care physician for recheck in person in 2-3 days-especially if symptoms aren't improving. If you cannot see your PCP, you can schedule a follow up appointment at a [facility]. Go to the emergency department if you develop any new or worsening symptoms including eye pain, changes in vision, or anything else that is concerning.    Excuses can be found in \"Letters\" section of [employer] comfort. Can print if opened from a web browser  Care Anywhere phone number is [phone] if you need assistance or have further questions    [phone]  Schedule or Reschedule Outpatient Testing - Option 2  Billing - Option 3  General Info - Option 4  [place] Help - Option 5  Comprehensive Spine Program - Option 6        "

## 2025-01-21 NOTE — PROGRESS NOTES
Virtual Regular Visit  Name: Shyam Rizvi      : 2000      MRN: [id number]  Encounter Provider: Shannon D Severino, PA-C  Encounter Date: 2025   Encounter department: VIRTUAL CARE       Verification of patient location:  Patient is located at Other in the following state in which I hold an active license [place] :  Assessment & Plan  Acute bacterial conjunctivitis of left eye    Orders:    polymyxin b-trimethoprim (POLYTRIM) ophthalmic solution; Administer 1 drop into the left eye every 4 (four) hours for 7 days        Encounter provider Shannon D Severino, PA-C    The patient was identified by name and date of birth. Shyam Rizvi was informed that this is a telemedicine visit and that the visit is being conducted through the Epic Embedded platform. She agrees to proceed..  My office door was closed. No one else was in the room.  She acknowledged consent and understanding of privacy and security of the video platform. The patient has agreed to participate and understands they can discontinue the visit at any time.    Patient is aware this is a billable service.     History was obtained from: History obtained from: patient and child around  History of Present Illness     Pt reports cold-like sx. Also concerned for pink eye since yesterday- eye redness, discharge which was white, occasional itchiness in L eye only. Tried OTC eye drops and zyrtec. Denies changes in vision or eye pain. Does not wear contacts or glasses      Review of Systems   Constitutional:  Negative for fever.   HENT:  Negative for nosebleeds.    Eyes:  Positive for discharge, redness and itching. Negative for pain and visual disturbance.   Respiratory:  Negative for shortness of breath.    Cardiovascular:  Negative for chest pain.   Gastrointestinal:  Negative for blood in stool.   Genitourinary:  Negative for hematuria.   Musculoskeletal:  Negative for gait problem.   Skin:  Negative for rash.   Neurological:  Negative for seizures.    Psychiatric/Behavioral:  Negative for behavioral problems.        Objective   LMP  (LMP Unknown)     Physical Exam  Constitutional:       General: She is not in acute distress.     Appearance: Normal appearance. She is not toxic-appearing.   HENT:      Head: Normocephalic and atraumatic.      Nose: No rhinorrhea.      Mouth/Throat:      Mouth: Mucous membranes are moist.   Eyes:      Conjunctiva/sclera: Conjunctivae normal.   Pulmonary:      Effort: Pulmonary effort is normal. No respiratory distress.      Breath sounds: No wheezing (no gross audible wheeze through computer).   Musculoskeletal:      Cervical back: Normal range of motion.   Skin:     Findings: No rash (on face or neck).   Neurological:      Mental Status: She is alert.      Cranial Nerves: No dysarthria or facial asymmetry.   Psychiatric:         Mood and Affect: Mood normal.         Behavior: Behavior normal.         Visit Time  Total Visit Duration: 13 minutes not including the time spent for establishing the audio/video connection.

## 2025-01-24 PROBLEM — Z3A.18 18 WEEKS GESTATION OF PREGNANCY: Status: ACTIVE | Noted: 2025-01-24

## 2025-01-30 ENCOUNTER — ROUTINE PRENATAL (OUTPATIENT)
Dept: PERINATAL CARE | Facility: OTHER | Age: 25
End: 2025-01-30
Payer: COMMERCIAL

## 2025-01-30 VITALS
SYSTOLIC BLOOD PRESSURE: 94 MMHG | HEIGHT: 65 IN | HEART RATE: 97 BPM | DIASTOLIC BLOOD PRESSURE: 56 MMHG | WEIGHT: 161 LBS | BODY MASS INDEX: 26.82 KG/M2

## 2025-01-30 DIAGNOSIS — O09.292 HISTORY OF PRETERM PREMATURE RUPTURE OF MEMBRANES (PROM) IN PREVIOUS PREGNANCY, CURRENTLY PREGNANT IN SECOND TRIMESTER: ICD-10-CM

## 2025-01-30 DIAGNOSIS — Z36.86 ENCOUNTER FOR ANTENATAL SCREENING FOR CERVICAL LENGTH: ICD-10-CM

## 2025-01-30 DIAGNOSIS — Z3A.18 18 WEEKS GESTATION OF PREGNANCY: Primary | ICD-10-CM

## 2025-01-30 DIAGNOSIS — O09.899 HISTORY OF PRETERM DELIVERY, CURRENTLY PREGNANT: ICD-10-CM

## 2025-01-30 PROCEDURE — 76817 TRANSVAGINAL US OBSTETRIC: CPT | Performed by: OBSTETRICS & GYNECOLOGY

## 2025-01-30 PROCEDURE — 76815 OB US LIMITED FETUS(S): CPT | Performed by: OBSTETRICS & GYNECOLOGY

## 2025-01-30 PROCEDURE — 99213 OFFICE O/P EST LOW 20 MIN: CPT | Performed by: OBSTETRICS & GYNECOLOGY

## 2025-01-30 NOTE — PROGRESS NOTES
Ultrasound Probe Disinfection    A transvaginal ultrasound was performed.   Prior to use, disinfection was performed with High Level Disinfection Process ([employer]).  Probe serial number M2: [id number] was used.    Mine Arteaga  01/30/25  10:05 AM

## 2025-01-30 NOTE — PROGRESS NOTES
"[facility]: Shyam Rizvi was seen today for serial cervical length screening ultrasound.  See ultrasound report under \"OB Procedures\" tab.   Please don't hesitate to contact our office with any concerns or questions.  -Sanjana Ingram MD      "

## 2025-01-31 ENCOUNTER — HOSPITAL ENCOUNTER (OUTPATIENT)
Facility: HOSPITAL | Age: 25
Discharge: HOME | End: 2025-01-31
Attending: OBSTETRICS & GYNECOLOGY | Admitting: OBSTETRICS & GYNECOLOGY
Payer: COMMERCIAL

## 2025-01-31 ENCOUNTER — NURSE TRIAGE (OUTPATIENT)
Age: 25
End: 2025-01-31

## 2025-01-31 VITALS
HEIGHT: 65 IN | RESPIRATION RATE: 16 BRPM | OXYGEN SATURATION: 99 % | WEIGHT: 161 LBS | SYSTOLIC BLOOD PRESSURE: 114 MMHG | BODY MASS INDEX: 26.82 KG/M2 | DIASTOLIC BLOOD PRESSURE: 59 MMHG | HEART RATE: 106 BPM

## 2025-01-31 PROCEDURE — NC001 PR NO CHARGE: Performed by: OBSTETRICS & GYNECOLOGY

## 2025-01-31 PROCEDURE — 99213 OFFICE O/P EST LOW 20 MIN: CPT

## 2025-01-31 PROCEDURE — 76815 OB US LIMITED FETUS(S): CPT

## 2025-01-31 NOTE — TELEPHONE ENCOUNTER
"18 weeks 2 days EDC 2025   On  patient had gush clear of fluid in her underwear. She discussed with MFM and DEVON was nml. Today she had little trickles of fluid throughout the day today. Denies cramping, pain or vaginal bleeding. States she had BV , [place] and UTI all in past and this is different. Denies any other symptoms, no pain, no pregnancy concerns. Advised ED, Patient states MFM was not concerned and told her to watch. Advised patient will review with on call and to keep phone nearby. Patient verbalzied understanding.  Next OB appointment   ESC Dr. Espinal Please send to triage for evaluation   4:11 Phone call back to patient and she will head to [place] triage, ETA 25 minutes, ESC to AN charge RN.   Reason for Disposition   < 20 weeks pregnant    Answer Assessment - Initial Assessment Questions  1. ONSET: \"When did the symptoms begin?\"          gush of fluid, more trickling today  2. CONTRACTIONS: \"Are you having any contractions?\" If yes, ask: \"Describe the contractions that you are having.\" (e.g., duration, frequency, regularity, severity)      no  3. RULA: \"What date are you expecting to deliver?\"      2025  4. PARITY: \"Have you had a baby before?\" If Yes, ask: \"How long did the labor last?\"        5. FETAL MOVEMENT: \"Has the baby's movement decreased or changed significantly from normal?\"      18 weeks 2 days  6. OTHER SYMPTOMS: \"Do you have any other symptoms?\" (e.g., abdominal pain, vaginal bleeding, fever, hand/facial swelling)      denies    Protocols used: Pregnancy - Rupture of Membranes-ADULT-[place]    "

## 2025-02-01 NOTE — PROCEDURES
Shyam Rizvi, a  at 18w2d with an RULA of 2025, by Ultrasound, was seen at [facility] for the following procedure(s): $Procedure Type: DEVON]         4 Quadrant DEVON  LVP (cm): 4.3 cm                         Jacob Montalvo MD  PGY-4  2025  7:39 PM

## 2025-02-01 NOTE — PROGRESS NOTES
"Triage Note - OB  Shyam Rizvi 24 y.o. female MRN: [id number]  Unit/Bed#:  TRIAGE 1-01 Encounter: [id number]    OB TRIAGE NOTE  Shyam Rizvi  [id number]  2025  7:36 PM  LD TRIAGE 1/LD TRIAGE 1-*    ASSESS:  24 y.o.  18w2d with vaginal fluid leakage, no concern for rupture.    PLAN  #1. R/o PPROM  Neg nitrazine, ferning, pooling  LVP: 4.29 cm  SVE declined  FHT wnl    #2. Discharge instructions  Patient instructed to call if experiencing worsening contractions, vaginal bleeding, loss of fluid or decreased fetal movement.  Will follow up with OBGYN on 25.    D/w Dr. Espinal  ______________    SUBJECTIVE    RULA: Estimated Date of Delivery: 25    HPI Chronology:  24 y.o.  18w2d presents with complaint of leakage of fluid earlier today after having an MFM appt yest wher LVP was 4 cm. She has not had continuous leakage of fluid. Denies intercourse.     Contractions: no  Leakage: no  Bleeding: no  Fetal Movement: yes  Pelvic pain: no    Vitals:   /59 (BP Location: Left arm)   Pulse (!) 106   Resp 16   Ht 5' 5\" (1.651 m)   Wt 73 kg (161 lb)   LMP  (LMP Unknown)   SpO2 99%   BMI 26.79 kg/m²   Body mass index is 26.79 kg/m².    Review of Systems   Constitutional:  Negative for chills and fever.   Respiratory:  Negative for chest tightness and shortness of breath.    Cardiovascular:  Negative for chest pain and palpitations.   Gastrointestinal:  Negative for abdominal pain.   Genitourinary:  Positive for vaginal discharge. Negative for dysuria, vaginal bleeding and vaginal pain.   Musculoskeletal:  Negative for back pain.   Neurological:  Negative for headaches.       Physical Exam  Constitutional:       Appearance: She is normal weight.   HENT:      Head: Normocephalic.   Eyes:      Conjunctiva/sclera: Conjunctivae normal.   Cardiovascular:      Rate and Rhythm: Tachycardia present.      Pulses: Normal pulses.   Pulmonary:      Effort: Pulmonary effort is normal.   Abdominal:     "  Palpations: Abdomen is soft.      Tenderness: There is no abdominal tenderness.   Genitourinary:     General: Normal vulva.      Comments: Speculum: normal vaginal and cervical mucosa, no pooling  Skin:     General: Skin is warm.   Neurological:      Mental Status: She is alert and oriented to person, place, and time.   Psychiatric:         Mood and Affect: Mood normal.         FHT:  154 bpm on Doppler    Labs: No results found for this or any previous visit (from the past 24 hours).    Lab, Imaging and other studies: I have personally reviewed pertinent reports.      Jacob Montalvo MD  1/31/2025  7:36 PM

## 2025-02-03 PROBLEM — Z34.90 UNPLANNED PREGNANCY, UNKNOWN IF WANTED: Status: RESOLVED | Noted: 2024-12-04 | Resolved: 2025-02-03

## 2025-02-03 PROBLEM — R39.9 UTI SYMPTOMS: Status: RESOLVED | Noted: 2024-12-15 | Resolved: 2025-02-03

## 2025-02-03 NOTE — PATIENT INSTRUCTIONS
Patient Education     Pregnancy - The Fourth Month   About this topic   It is important for you to learn how to take care of yourself to help you have a healthy baby and safe delivery. It is good to have health care throughout your pregnancy.  The fourth month of your pregnancy starts around week 14 and lasts through week 18. By knowing how far along you are, you can learn what is normal for this stage of your pregnancy and plan for what is next.  General   Growth and Development   During the fourth month of your pregnancy, here are some things you can expect.  You may:  Start to show that you are pregnant. It is normal to gain about 5 to 10 pounds (2.3 to 4.5 kg) total in your first 4 months.  Have heartburn  Feel like you have trouble paying attention to things  Have less nausea  Notice your breasts are growing and the veins are easier to see on them  Have swollen veins in your legs and feet, more nosebleeds, or bleeding when you brush your teeth. These are all because of the extra blood your body has while you are pregnant.  Notice more swelling in your hands and feet  Start to feel fluttering when you are lying or sitting quietly. This is your baby kicking.  Have pain in your sides with sudden movement. This is normal and happens because the ligaments in your belly are stretching.  Have a little more energy. Exercise is good for you, but check with your doctor before starting new exercises.  Most of the time it is safe for you to have sex while you are pregnant. It won’t hurt the baby.  Your baby’s:  Skin is very thin and you can easily see blood vessels through it. Your baby is covered with lots of fine hair to protect their skin.  Bones are starting to harden. Your baby is able to frown, smile, stretch, and move.  Practicing breathing movements while inside of your womb  About 6 inches (16 cm) long and weighs about 7 ounces (200 gm). Your baby is about the size of an orange.  Things to Think About   Avoid  alcohol, drugs, tobacco products, and second hand smoke  Check with your doctor before taking any kind of drugs. Continue to take your vitamin with folic acid.  Avoid cleaning cat litter boxes. This can cause a disease that causes birth defects in your baby.  Amniocentesis and other prenatal screening tests may be done this month.  Try sleeping on your side. Use a pillow between your legs. Avoid sleeping on your back. This will help with the blood flow to your baby.  Change positions and get up slowly. Your heart has to work hard to cope with all of the extra blood volume.  Where will you take your baby for care after they are born? This is a good time to find a doctor for your baby.  Eat fresh fruits and foods with a lot of fiber to help with hard stools.  Drink at least 6 to 8 glasses of water each day.  When do I need to call the doctor?   Vaginal bleeding  Leaking of fluid from your vagina  Problems with constipation  Belly pain  Any illness or infection  Severe headaches or headaches that won’t go away  Last Reviewed Date   2020-04-20  Consumer Information Use and Disclaimer   This generalized information is a limited summary of diagnosis, treatment, and/or medication information. It is not meant to be comprehensive and should be used as a tool to help the user understand and/or assess potential diagnostic and treatment options. It does NOT include all information about conditions, treatments, medications, side effects, or risks that may apply to a specific patient. It is not intended to be medical advice or a substitute for the medical advice, diagnosis, or treatment of a health care provider based on the health care provider's examination and assessment of a patient’s specific and unique circumstances. Patients must speak with a health care provider for complete information about their health, medical questions, and treatment options, including any risks or benefits regarding use of medications. This  information does not endorse any treatments or medications as safe, effective, or approved for treating a specific patient. UpToDate, Inc. and its affiliates disclaim any warranty or liability relating to this information or the use thereof. The use of this information is governed by the Terms of Use, available at https://www.Enohm.com/en/know/clinical-effectiveness-terms   Copyright   Copyright © 2024 UpToDate, Inc. and its affiliates and/or licensors. All rights reserved.

## 2025-02-04 ENCOUNTER — ROUTINE PRENATAL (OUTPATIENT)
Age: 25
End: 2025-02-04
Payer: COMMERCIAL

## 2025-02-04 VITALS
BODY MASS INDEX: 26.66 KG/M2 | HEIGHT: 65 IN | WEIGHT: 160 LBS | SYSTOLIC BLOOD PRESSURE: 112 MMHG | DIASTOLIC BLOOD PRESSURE: 78 MMHG

## 2025-02-04 DIAGNOSIS — O09.899 HISTORY OF PRETERM DELIVERY, CURRENTLY PREGNANT: ICD-10-CM

## 2025-02-04 DIAGNOSIS — Z3A.18 18 WEEKS GESTATION OF PREGNANCY: ICD-10-CM

## 2025-02-04 DIAGNOSIS — Z34.82 ENCOUNTER FOR SUPERVISION OF OTHER NORMAL PREGNANCY IN SECOND TRIMESTER: Primary | ICD-10-CM

## 2025-02-04 DIAGNOSIS — Z86.19 HISTORY OF HERPES GENITALIS: ICD-10-CM

## 2025-02-04 DIAGNOSIS — O21.9 NAUSEA AND VOMITING DURING PREGNANCY PRIOR TO 22 WEEKS GESTATION: ICD-10-CM

## 2025-02-04 LAB
SL AMB  POCT GLUCOSE, UA: NORMAL
SL AMB POCT URINE PROTEIN: NORMAL

## 2025-02-04 PROCEDURE — PNV: Performed by: NURSE PRACTITIONER

## 2025-02-04 PROCEDURE — 81002 URINALYSIS NONAUTO W/O SCOPE: CPT | Performed by: NURSE PRACTITIONER

## 2025-02-04 RX ORDER — ONDANSETRON 4 MG/1
4 TABLET, ORALLY DISINTEGRATING ORAL EVERY 8 HOURS PRN
Qty: 30 TABLET | Refills: 1 | Status: SHIPPED | OUTPATIENT
Start: 2025-02-04 | End: 2025-03-06

## 2025-02-04 NOTE — ASSESSMENT & PLAN NOTE
Hx of HSV  HX PTL  Ss trait  Protein S  msAFP- to be done  L2 on 2/13  NIPS low  GC/CT neg/neg  Pap neg 12/09/2024  ASA daily

## 2025-02-04 NOTE — PROGRESS NOTES
Name: Shyam Rizvi      : 2000      MRN: [id number]  Encounter Provider: TREY Hopkins  Encounter Date: 2025   Encounter department: [facility] [place]  :  Assessment & Plan  Encounter for supervision of other normal pregnancy in second trimester  Feels ok, improved nausea but vomited this am. Denies LOF/CTX/VB.   Discussed fetal awareness.   No concerns.   Overview charting updated today.         18 weeks gestation of pregnancy  Hx of HSV  HX PTL  Ss trait  Protein S  msAFP- to be done  L2 on   NIPS low  GC/CT neg/neg  Pap neg 2024  ASA daily       History of herpes genitalis  Prophylaxis at 36 weeks       History of  delivery, currently pregnant         Nausea and vomiting during pregnancy prior to 22 weeks gestation  Will try papaya.  Continue with ginger as needed.  Orders:    ondansetron (ZOFRAN-ODT) 4 mg disintegrating tablet; Take 1 tablet (4 mg total) by mouth every 8 (eight) hours as needed for nausea or vomiting

## 2025-02-04 NOTE — ASSESSMENT & PLAN NOTE
Will try papaya.  Continue with ginger as needed.  Orders:    ondansetron (ZOFRAN-ODT) 4 mg disintegrating tablet; Take 1 tablet (4 mg total) by mouth every 8 (eight) hours as needed for nausea or vomiting

## 2025-02-04 NOTE — ASSESSMENT & PLAN NOTE
Feels ok, improved nausea but vomited this am. Denies LOF/CTX/VB.   Discussed fetal awareness.   No concerns.   Overview charting updated today.

## 2025-02-04 NOTE — PROGRESS NOTES
Pt is feeling movement no LOF  or vaginal bleeding. She was nauseated this morning and threw up. She would like more zofran.

## 2025-02-11 ENCOUNTER — APPOINTMENT (OUTPATIENT)
Dept: LAB | Facility: CLINIC | Age: 25
End: 2025-02-11
Payer: COMMERCIAL

## 2025-02-11 ENCOUNTER — OFFICE VISIT (OUTPATIENT)
Dept: FAMILY MEDICINE CLINIC | Facility: CLINIC | Age: 25
End: 2025-02-11
Payer: COMMERCIAL

## 2025-02-11 VITALS
OXYGEN SATURATION: 98 % | DIASTOLIC BLOOD PRESSURE: 70 MMHG | WEIGHT: 163 LBS | SYSTOLIC BLOOD PRESSURE: 100 MMHG | TEMPERATURE: 97.7 F | BODY MASS INDEX: 27.16 KG/M2 | HEIGHT: 65 IN

## 2025-02-11 DIAGNOSIS — Z3A.18 18 WEEKS GESTATION OF PREGNANCY: Primary | ICD-10-CM

## 2025-02-11 DIAGNOSIS — K42.9 UMBILICAL HERNIA WITHOUT OBSTRUCTION AND WITHOUT GANGRENE: ICD-10-CM

## 2025-02-11 DIAGNOSIS — Z36.9 ANTENATAL SCREENING ENCOUNTER: ICD-10-CM

## 2025-02-11 DIAGNOSIS — F32.2 SEVERE MAJOR DEPRESSIVE DISORDER (HCC): ICD-10-CM

## 2025-02-11 DIAGNOSIS — Z3A.18 18 WEEKS GESTATION OF PREGNANCY: ICD-10-CM

## 2025-02-11 LAB — TSH SERPL DL<=0.05 MIU/L-ACNC: 0.89 UIU/ML (ref 0.45–4.5)

## 2025-02-11 PROCEDURE — 84443 ASSAY THYROID STIM HORMONE: CPT

## 2025-02-11 PROCEDURE — 82105 ALPHA-FETOPROTEIN SERUM: CPT

## 2025-02-11 PROCEDURE — 99214 OFFICE O/P EST MOD 30 MIN: CPT | Performed by: NURSE PRACTITIONER

## 2025-02-11 PROCEDURE — 36415 COLL VENOUS BLD VENIPUNCTURE: CPT

## 2025-02-11 NOTE — PATIENT INSTRUCTIONS
"Patient Education     Abdominal wall hernias   The Basics   Written by the doctors and editors at [facility]   What is an abdominal wall hernia? -- The internal organs and tissues in the belly are held in place by a tough outer wall of tissue called the \"abdominal wall.\" An abdominal hernia is an area in that wall that is weak or torn. Often, when there is a hernia, organs or tissues that are normally held in place by the abdominal wall bulge or stick out through the weak or torn spot.  The size of the bulge can change depending on how much pressure is put on the abdominal wall. For example, straining when coughing or having a bowel movement can make a hernia look bigger. Lying down overnight can make the hernia look smaller, or even disappear, in the morning.  There are many different kinds of abdominal wall hernias (figure 1).  What are the symptoms of abdominal wall hernias? -- Abdominal wall hernias do not always cause symptoms. When they do, they can cause some or all of these symptoms:   A bulge somewhere on the trunk of the body - This bulge can be so small that you don't even realize it's there.   Pain, especially when coughing, straining, or using nearby muscles   A pulling sensation around the bulge   Nausea or vomiting if part of the intestine is blocked in the hernia  Abdominal wall hernias can balloon out and form a sac. That sac can hold a loop of intestine or a piece of fat that should normally be tucked inside of the belly. This can be painful and even dangerous if the tissue in the hernia gets trapped and unable to slide back into the belly. When this happens, the tissue does not get enough blood, so it can become swollen or even die (figure 2).  Should I see a doctor or nurse? -- Yes. See a doctor or nurse if you have any of the symptoms of a hernia. In most cases, doctors can diagnose a hernia just by doing an exam. During the exam, the doctor might ask you to cough or bear down while pressing on " "your hernia. This might be uncomfortable, but it is necessary to find the source of the problem.  Most of the time, the contents of the hernia can be \"reduced,\" or gently pushed back into the belly. But sometimes, the hernia gets trapped and won't go back in. If that happens, the tissue that is trapped can get damaged.  If you develop severe pain around a hernia bulge or feel sick, call your doctor or surgeon right away.  How are hernias treated? -- Not all hernias need treatment right away. But many do need to be repaired with surgery.  Surgeons can repair most hernias in 1 of 2 ways. The right surgery for you depends on the size of your hernia, where on the abdominal wall it is, whether this is the first time it is getting repaired, what your general health is like, and your surgeon's experience.  The 2 types of surgery are:   Open surgery - During an open surgery, the doctor makes 1 large cut near the hernia to repair it.   Minimally invasive surgery - \"Minimally invasive\" surgery lets the doctor make smaller cuts in the skin. They insert long, thin tools through the cuts. One of the tools has a camera (called a \"laparoscope\") on the end, which sends pictures to a TV screen. The doctor can look at the screen to see inside the body. Then, they use the long tools to do the surgery. They can control the tools directly, or with the help of a robot (this is called \"robot-assisted\" surgery).  If your hernia has reduced the blood supply to a loop of intestine, your doctor might need to remove that piece of intestine. Usually, they will then sew the intestine back together.  The recovery and aftercare for each type of hernia repair is different. Your doctor or nurse can tell you what to expect after your surgery.  All topics are updated as new evidence becomes available and our peer review process is complete.  This topic retrieved from [employer] on: Feb 26, 2024.  Topic 94367 Version 10.0  Release: 32.2.4 - C32.56  © " 2024 UpToDate, Inc. and/or its affiliates. All rights reserved.  figure 1: Abdominal wall hernias     Abdominal wall hernias can happen in different parts of the torso:  Incisional hernias happen along incisions from surgery.  Umbilical hernias happen at the belly button.  Epigastric hernias happen in the midline above the belly button.  Spigelian hernias happen to the left or right of the midline, where 2 layers of muscle meet.  Lumbar hernias (not shown) happen at the back.  Inguinal hernias happen in the groin area.  Femoral hernias happen where the thigh joins the torso.  Graphic 60977 Version 4.0  figure 2: Intestines bulging through hernia     In some cases, a loop of intestine can poke through a hernia. Often, surgeons can push the loop of intestine back in. But if the loop gets trapped or does not get enough blood, surgeons sometimes have to remove it and reconnect the 2 ends of intestine that are left.  Graphic 63891 Version 3.0  Consumer Information Use and Disclaimer   Disclaimer: This generalized information is a limited summary of diagnosis, treatment, and/or medication information. It is not meant to be comprehensive and should be used as a tool to help the user understand and/or assess potential diagnostic and treatment options. It does NOT include all information about conditions, treatments, medications, side effects, or risks that may apply to a specific patient. It is not intended to be medical advice or a substitute for the medical advice, diagnosis, or treatment of a health care provider based on the health care provider's examination and assessment of a patient's specific and unique circumstances. Patients must speak with a health care provider for complete information about their health, medical questions, and treatment options, including any risks or benefits regarding use of medications. This information does not endorse any treatments or medications as safe, effective, or approved for  treating a specific patient. UpToDate, Inc. and its affiliates disclaim any warranty or liability relating to this information or the use thereof.The use of this information is governed by the Terms of Use, available at https://www.wol3LMuwer.com/en/know/clinical-effectiveness-terms. 2024© UpToDate, Inc. and its affiliates and/or licensors. All rights reserved.  Copyright   © 2024 UpToDate, Inc. and/or its affiliates. All rights reserved.

## 2025-02-11 NOTE — PROGRESS NOTES
"Name: Shyam Rizvi      : 2000      MRN: [id number]  Encounter Provider: TREY Aguillon  Encounter Date: 2025   Encounter department: [facility]  :  Assessment & Plan  18 weeks gestation of pregnancy  Patient follows with GYN.  TSH level ordered  Orders:    TSH, 3rd generation with Free T4 reflex; Future    Severe major depressive disorder (HCC)  Patient has been taking the Zoloft.  Reports that it is working well.  Continue with self-care.         Umbilical hernia without obstruction and without gangrene  Patient reports pain around umbilical area feels a mass at times.  Currently no mass with inspection.  Patient is 20 weeks pregnant.  Does also experience an episode of constipation.  Education provided regarding management of hernia.  Does take stool softener for constipation.  Continue with fluid hydration.  Advise if pain becomes acute need to go to the ER.                History of Present Illness   Patient is here for sick visit.  Patient is 20 weeks pregnant.  Reports some pain around umbilical area, reports that she does feel a mass at times.      Review of Systems   Constitutional: Negative.    HENT: Negative.     Eyes: Negative.    Respiratory: Negative.     Cardiovascular: Negative.    Gastrointestinal:  Positive for abdominal distention.   Endocrine: Negative.    Genitourinary: Negative.    Musculoskeletal: Negative.    Skin: Negative.    Allergic/Immunologic: Negative.    Neurological: Negative.    Psychiatric/Behavioral: Negative.         Objective   /70 (BP Location: Left arm)   Temp 97.7 °F (36.5 °C) (Oral)   Ht 5' 5\" (1.651 m)   Wt 73.9 kg (163 lb)   LMP  (LMP Unknown)   SpO2 98%   BMI 27.12 kg/m²      Physical Exam  Constitutional:       General: She is not in acute distress.     Appearance: Normal appearance. She is not ill-appearing.   HENT:      Head: Normocephalic and atraumatic.      Nose: Nose normal.   Cardiovascular:      Rate and " Rhythm: Normal rate and regular rhythm.      Pulses: Normal pulses.   Pulmonary:      Effort: Pulmonary effort is normal. No respiratory distress.      Breath sounds: Normal breath sounds.   Chest:      Chest wall: No tenderness.   Abdominal:      General: Abdomen is flat. Bowel sounds are normal. There is no distension.      Palpations: There is mass.      Tenderness: There is no abdominal tenderness.   Musculoskeletal:         General: Normal range of motion.      Cervical back: Normal range of motion and neck supple.   Skin:     General: Skin is warm and dry.   Neurological:      General: No focal deficit present.      Mental Status: She is alert and oriented to person, place, and time.   Psychiatric:         Attention and Perception: Attention normal.         Mood and Affect: Mood normal.         Behavior: Behavior normal. Behavior is cooperative.         Thought Content: Thought content normal.         Cognition and Memory: Cognition normal.         Judgment: Judgment normal.

## 2025-02-11 NOTE — ASSESSMENT & PLAN NOTE
Patient follows with GYN.  TSH level ordered  Orders:    TSH, 3rd generation with Free T4 reflex; Future

## 2025-02-11 NOTE — ASSESSMENT & PLAN NOTE
Patient reports pain around umbilical area feels a mass at times.  Currently no mass with inspection.  Patient is 20 weeks pregnant.  Does also experience an episode of constipation.  Education provided regarding management of hernia.  Does take stool softener for constipation.  Continue with fluid hydration.  Advise if pain becomes acute need to go to the ER.

## 2025-02-11 NOTE — ASSESSMENT & PLAN NOTE
Patient has been taking the Zoloft.  Reports that it is working well.  Continue with self-care.

## 2025-02-13 ENCOUNTER — ROUTINE PRENATAL (OUTPATIENT)
Dept: PERINATAL CARE | Facility: OTHER | Age: 25
End: 2025-02-13
Payer: COMMERCIAL

## 2025-02-13 VITALS
DIASTOLIC BLOOD PRESSURE: 60 MMHG | BODY MASS INDEX: 26.92 KG/M2 | HEIGHT: 65 IN | WEIGHT: 161.6 LBS | HEART RATE: 78 BPM | SYSTOLIC BLOOD PRESSURE: 100 MMHG

## 2025-02-13 DIAGNOSIS — Z3A.20 20 WEEKS GESTATION OF PREGNANCY: ICD-10-CM

## 2025-02-13 DIAGNOSIS — O09.292 HISTORY OF PRETERM PREMATURE RUPTURE OF MEMBRANES (PROM) IN PREVIOUS PREGNANCY, CURRENTLY PREGNANT IN SECOND TRIMESTER: ICD-10-CM

## 2025-02-13 DIAGNOSIS — O35.2XX0 HEREDITARY DISEASE IN FAMILY POSSIBLY AFFECTING FETUS, AFFECTING MANAGEMENT OF MOTHER IN PREGNANCY, SINGLE OR UNSPECIFIED FETUS: Primary | ICD-10-CM

## 2025-02-13 PROCEDURE — 76811 OB US DETAILED SNGL FETUS: CPT | Performed by: OBSTETRICS & GYNECOLOGY

## 2025-02-13 PROCEDURE — 76817 TRANSVAGINAL US OBSTETRIC: CPT | Performed by: OBSTETRICS & GYNECOLOGY

## 2025-02-13 PROCEDURE — 99213 OFFICE O/P EST LOW 20 MIN: CPT | Performed by: OBSTETRICS & GYNECOLOGY

## 2025-02-13 NOTE — PROGRESS NOTES
Ultrasound Probe Disinfection    A transvaginal ultrasound was performed.   Prior to use, disinfection was performed with High Level Disinfection Process (Yecuris).  Probe serial number M1: [id number]   was used.    Skylar Espinoza  02/13/25  9:24 AM

## 2025-02-13 NOTE — PROGRESS NOTES
The patient was seen today for an ultrasound.  Please see ultrasound report (located under Ob Procedures) for additional details.   Thank you very much for allowing us to participate in the care of this very nice patient.  Should you have any questions, please do not hesitate to contact me.     Bharathi Meng MD FACOG  Attending Physician, Maternal-Fetal Medicine  [facility]

## 2025-02-14 ENCOUNTER — RESULTS FOLLOW-UP (OUTPATIENT)
Dept: OBGYN CLINIC | Facility: MEDICAL CENTER | Age: 25
End: 2025-02-14

## 2025-02-14 LAB
2ND TRIMESTER 4 SCREEN SERPL-IMP: NORMAL
AFP ADJ MOM SERPL: 0.93
AFP INTERP AMN-IMP: NORMAL
AFP INTERP SERPL-IMP: NORMAL
AFP INTERP SERPL-IMP: NORMAL
AFP SERPL-MCNC: 54.2 NG/ML
AGE AT DELIVERY: 25.6 YR
GA METHOD: NORMAL
GA: 19.9 WEEKS
IDDM PATIENT QL: NO
MULTIPLE PREGNANCY: NO
NEURAL TUBE DEFECT RISK FETUS: NORMAL %

## 2025-02-21 ENCOUNTER — RESULTS FOLLOW-UP (OUTPATIENT)
Dept: FAMILY MEDICINE CLINIC | Facility: CLINIC | Age: 25
End: 2025-02-21

## 2025-03-03 PROBLEM — Z3A.22 22 WEEKS GESTATION OF PREGNANCY: Status: ACTIVE | Noted: 2025-01-24

## 2025-03-03 NOTE — PATIENT INSTRUCTIONS
Patient Education     Pregnancy - The Fifth Month   About this topic   It is important for you to learn how to take care of yourself to help you have a healthy baby and safe delivery. It is good to have health care throughout your pregnancy.  The fifth month of your pregnancy starts around week 19 and lasts through week 23. By knowing how far along you are, you can learn what is normal for this stage of your pregnancy and plan for what is next.  General   Growth and Development   During the fifth month of your pregnancy, here are some things you can expect.  You may:  Start to gain a little more weight. It is normal to gain about 10 to 15 pounds (4.5 to 7 kg) total in your first 5 months.  Have leg cramps. Be sure to drink plenty of water.  Have loose teeth.  Have more trouble breathing or with heartburn as your baby gets bigger  Start having [place] contractions. You may feel your belly squeezing or getting tight. Be sure to drink 6 to 8 glasses of water each day.  Notice you are able to express milk from your breasts  See stretch marks on your belly, breasts, or legs. Stay active to try and keep good muscle tone.  Be checked for gestational diabetes  Your baby's growth and development:  Your baby is covered with a thick whitish substance called vernix. This helps protect your baby’s skin.  Their genitals are able to be seen on an ultrasound. Your doctor will check your baby’s size, how much fluid there is around your baby, and all of your baby’s organs with the ultrasound.  Your baby is starting to be able to see, hear, taste, and feel touch.  The bones are starting to make blood cells.  Your baby is about 11 inches (28 cm) long and weighs about 1 pound (450 gm). Your baby is about the size of a grapefruit.  Things to Think About   Avoid alcohol, drugs, tobacco products, and second hand smoke  Do not clean your cat litter box. You could get a disease that causes birth defects to your baby.  Check with your  doctor before taking any kind of drugs. Continue to take your vitamin with folic acid.  Do you plan to breastfeed your baby?  Where will you deliver? Do you plan to take prenatal classes? If so, try to have them completed by your 8th month.  Start to think about your plans for pain control during labor.  You may want to learn about cord blood banking.  Rest and take breaks each day.  When do I need to call the doctor?   Contractions every 10 minutes or more often that do not go away with drinking water or position changes  Low, dull back pain that does not go away  Pressure in your pelvis that feels like your baby is pushing down  A gush or constant trickle of watery or bloody fluid leaking from your vagina  Cramps in your lower belly that come and go or are constant  Little to no movement felt by baby in 2 hours. Your baby should move at least 10 times every 2 hours.  Headache that does not go away, blurry vision, seeing spots or halos, increase in swelling in your hands, feet, or face, and pain under your ribs on the right side  Fever of 100.4°F (38°C) or higher  Bleeding or swelling of your gums  Urinating less, or having pain when you urinate  Vaginal bleeding with or without pain  After a car accident, fall, or any trauma to your belly  Having thoughts of harming yourself or others, or do not feel safe at home  Last Reviewed Date   2020-04-20  Consumer Information Use and Disclaimer   This generalized information is a limited summary of diagnosis, treatment, and/or medication information. It is not meant to be comprehensive and should be used as a tool to help the user understand and/or assess potential diagnostic and treatment options. It does NOT include all information about conditions, treatments, medications, side effects, or risks that may apply to a specific patient. It is not intended to be medical advice or a substitute for the medical advice, diagnosis, or treatment of a health care provider based on  the health care provider's examination and assessment of a patient’s specific and unique circumstances. Patients must speak with a health care provider for complete information about their health, medical questions, and treatment options, including any risks or benefits regarding use of medications. This information does not endorse any treatments or medications as safe, effective, or approved for treating a specific patient. UpToDate, Inc. and its affiliates disclaim any warranty or liability relating to this information or the use thereof. The use of this information is governed by the Terms of Use, available at https://www.woltersShowcase Giguwer.com/en/know/clinical-effectiveness-terms   Copyright   Copyright © 2024 UpToDate, Inc. and its affiliates and/or licensors. All rights reserved.

## 2025-03-04 ENCOUNTER — ROUTINE PRENATAL (OUTPATIENT)
Age: 25
End: 2025-03-04
Payer: COMMERCIAL

## 2025-03-04 VITALS — SYSTOLIC BLOOD PRESSURE: 110 MMHG | DIASTOLIC BLOOD PRESSURE: 74 MMHG | WEIGHT: 165 LBS | BODY MASS INDEX: 27.46 KG/M2

## 2025-03-04 DIAGNOSIS — O09.292 HISTORY OF PRETERM PREMATURE RUPTURE OF MEMBRANES (PROM) IN PREVIOUS PREGNANCY, CURRENTLY PREGNANT IN SECOND TRIMESTER: ICD-10-CM

## 2025-03-04 DIAGNOSIS — D68.59 PROTEIN S DEFICIENCY AFFECTING PREGNANCY (HCC): ICD-10-CM

## 2025-03-04 DIAGNOSIS — O09.899 HISTORY OF PRETERM DELIVERY, CURRENTLY PREGNANT: ICD-10-CM

## 2025-03-04 DIAGNOSIS — Z34.82 ENCOUNTER FOR SUPERVISION OF OTHER NORMAL PREGNANCY IN SECOND TRIMESTER: Primary | ICD-10-CM

## 2025-03-04 DIAGNOSIS — Z3A.22 22 WEEKS GESTATION OF PREGNANCY: ICD-10-CM

## 2025-03-04 DIAGNOSIS — Z86.19 HISTORY OF HERPES GENITALIS: ICD-10-CM

## 2025-03-04 DIAGNOSIS — O99.119 PROTEIN S DEFICIENCY AFFECTING PREGNANCY (HCC): ICD-10-CM

## 2025-03-04 DIAGNOSIS — O99.019 SICKLE CELL TRAIT IN MOTHER AFFECTING PREGNANCY (HCC): ICD-10-CM

## 2025-03-04 DIAGNOSIS — F32.2 SEVERE MAJOR DEPRESSIVE DISORDER (HCC): ICD-10-CM

## 2025-03-04 DIAGNOSIS — D57.3 SICKLE CELL TRAIT IN MOTHER AFFECTING PREGNANCY (HCC): ICD-10-CM

## 2025-03-04 LAB
SL AMB  POCT GLUCOSE, UA: NORMAL
SL AMB POCT URINE PROTEIN: NORMAL

## 2025-03-04 PROCEDURE — 81002 URINALYSIS NONAUTO W/O SCOPE: CPT | Performed by: NURSE PRACTITIONER

## 2025-03-04 PROCEDURE — PNV: Performed by: NURSE PRACTITIONER

## 2025-03-04 NOTE — PROGRESS NOTES
Name: Shyam Rizvi      : 2000      MRN: [id number]  Encounter Provider: TREY Hopkins  Encounter Date: 3/4/2025   Encounter department: [facility] [place]  :  Assessment & Plan  Encounter for supervision of other normal pregnancy in second trimester  Feels well. Denies LOF/CTX/VB. Discussed fetal awareness. No concerns. Overview charting updated today.         22 weeks gestation of pregnancy    HX PTL  Ss trait  Protein S  msAFP- negative  L2 on , FG at 32w on   NIPS low risk  GC/CT neg/neg  Pap neg 2024      Orders:    POCT urine dip    Protein S deficiency affecting pregnancy (HCC)  On daily ASA       Sickle cell trait in mother affecting pregnancy (HCC)         History of  delivery, currently pregnant         History of herpes genitalis  Prophylaxis at 36 weeks.       History of  premature rupture of membranes (PROM) in previous pregnancy, currently pregnant in second trimester         Severe major depressive disorder (HCC)  Stable

## 2025-03-04 NOTE — ASSESSMENT & PLAN NOTE
Feels well. Denies LOF/CTX/VB. Discussed fetal awareness. No concerns. Overview charting updated today.

## 2025-03-04 NOTE — ASSESSMENT & PLAN NOTE
HX PTL  Ss trait  Protein S  msAFP- negative  L2 on 2/13, FG at 32w on 5/8  NIPS low risk  GC/CT neg/neg  Pap neg 12/09/2024      Orders:    POCT urine dip

## 2025-03-21 ENCOUNTER — NURSE TRIAGE (OUTPATIENT)
Age: 25
End: 2025-03-21

## 2025-03-21 NOTE — TELEPHONE ENCOUNTER
"FOLLOW UP: Go to ED. ESC sent to Dr. Brianne Russo    REASON FOR CONVERSATION: Pregnancy Problem and Shortness of Breath    SYMPTOMS: SOB and fatigue    OTHER: Pt called in 25w2d  reporting SOB and fatigue that started today and has been worsening. States SOB is even at rest. Denies any cardiac/lung hx or hx of blood clots. She also denies any chest pain or dizziness. Denies any recent/current illness. Advised pt to be evaluated in the ED and she verbalized understanding and is in agreement.     DISPOSITION: Go to ED/UCC Now (Or to Office with PCP Approval)      Reason for Disposition   Pregnant or postpartum (from 0 to 6 weeks after delivery)    Answer Assessment - Initial Assessment Questions  1. RESPIRATORY STATUS: \"Describe your breathing?\" (e.g., wheezing, shortness of breath, unable to speak, severe coughing)       SOB  2. ONSET: \"When did this breathing problem begin?\"       This morning  3. PATTERN \"Does the difficult breathing come and go, or has it been constant since it started?\"       Constant and worsening  4. SEVERITY: \"How bad is your breathing?\" (e.g., mild, moderate, severe)       moderate  5. RECURRENT SYMPTOM: \"Have you had difficulty breathing before?\" If Yes, ask: \"When was the last time?\" and \"What happened that time?\"       denies  6. CARDIAC HISTORY: \"Do you have any history of heart disease?\" (e.g., heart attack, angina, bypass surgery, angioplasty)       denies  7. LUNG HISTORY: \"Do you have any history of lung disease?\"  (e.g., pulmonary embolus, asthma, emphysema)      denies  8. CAUSE: \"What do you think is causing the breathing problem?\"       unsure  9. OTHER SYMPTOMS: \"Do you have any other symptoms?\" (e.g., chest pain, cough, dizziness, fever, runny nose)      fatigue  10. O2 SATURATION MONITOR:  \"Do you use an oxygen saturation monitor (pulse oximeter) at home?\" If Yes, ask: \"What is your reading (oxygen level) today?\" \"What is your usual oxygen saturation reading?\" (e.g., " "95%)        denies  11. PREGNANCY: \"Is there any chance you are pregnant?\" \"When was your last menstrual period?\"        25w2d  12. TRAVEL: \"Have you traveled out of the country in the last month?\" (e.g., travel history, exposures)        denies    Protocols used: Breathing Difficulty-Adult-[place]    "

## 2025-04-01 ENCOUNTER — ROUTINE PRENATAL (OUTPATIENT)
Age: 25
End: 2025-04-01
Payer: COMMERCIAL

## 2025-04-01 VITALS — WEIGHT: 171.2 LBS | DIASTOLIC BLOOD PRESSURE: 66 MMHG | BODY MASS INDEX: 28.49 KG/M2 | SYSTOLIC BLOOD PRESSURE: 110 MMHG

## 2025-04-01 DIAGNOSIS — O09.292 HISTORY OF PRETERM PREMATURE RUPTURE OF MEMBRANES (PROM) IN PREVIOUS PREGNANCY, CURRENTLY PREGNANT IN SECOND TRIMESTER: ICD-10-CM

## 2025-04-01 DIAGNOSIS — O09.899 HISTORY OF PRETERM DELIVERY, CURRENTLY PREGNANT: ICD-10-CM

## 2025-04-01 DIAGNOSIS — Z30.09 CONTRACEPTIVE EDUCATION: ICD-10-CM

## 2025-04-01 DIAGNOSIS — Z34.82 ENCOUNTER FOR SUPERVISION OF OTHER NORMAL PREGNANCY, SECOND TRIMESTER: ICD-10-CM

## 2025-04-01 DIAGNOSIS — Z3A.26 26 WEEKS GESTATION OF PREGNANCY: Primary | ICD-10-CM

## 2025-04-01 LAB
SL AMB  POCT GLUCOSE, UA: NEGATIVE
SL AMB POCT URINE PROTEIN: NEGATIVE

## 2025-04-01 PROCEDURE — 81002 URINALYSIS NONAUTO W/O SCOPE: CPT | Performed by: STUDENT IN AN ORGANIZED HEALTH CARE EDUCATION/TRAINING PROGRAM

## 2025-04-01 PROCEDURE — PNV: Performed by: STUDENT IN AN ORGANIZED HEALTH CARE EDUCATION/TRAINING PROGRAM

## 2025-04-01 NOTE — ASSESSMENT & PLAN NOTE
-precautions reviewed  -declined flu vaccine  -3T labs ordered/reviewed  -prepregnancy BMI 26 with goal weight gain 15-25#: TWG = 14#

## 2025-04-01 NOTE — ASSESSMENT & PLAN NOTE
- reviewed bloody mucous x1, without contractions, likely benign. However, strict precautions given and discussed increased risk PTD

## 2025-04-01 NOTE — PROGRESS NOTES
24 y.o.  at 26w6d, here for routine OB visit. Feeling well overall.   Good FM. Denies LOF, contractions. She does note that she had a glob of bloody discharge last week. None since. No problems with activity - active with toddler.     Problem List Items Addressed This Visit          Obstetrics/Gynecology    History of  delivery, currently pregnant    - reviewed bloody mucous x1, without contractions, likely benign. However, strict precautions given and discussed increased risk PTD         History of  premature rupture of membranes (PROM) in previous pregnancy, currently pregnant in second trimester    CL screening complete, growth scheuled 25         26 weeks gestation of pregnancy - Primary    -precautions reviewed  -declined flu vaccine  -3T labs ordered/reviewed  -prepregnancy BMI 26 with goal weight gain 15-25#: TWG = 14#            Other    Contraceptive education     Other Visit Diagnoses         Encounter for supervision of other normal pregnancy, second trimester        Relevant Orders    POCT urine dip    Anemia Panel w/Reflex, OB    CBC and differential    Glucose, 1H PG    RPR-Syphilis Screening (Total Syphilis IGG/IGM)

## 2025-04-01 NOTE — PROGRESS NOTES
Pt is here for routine ob visit   No concerns at this time  Urine neg/neg   No LOF,Contractions  Bloody discharge 3 days ago, denies intercourse/cramping  +FM   Declined flu vaccine   28wk labs ordered

## 2025-04-02 ENCOUNTER — APPOINTMENT (OUTPATIENT)
Dept: LAB | Facility: CLINIC | Age: 25
End: 2025-04-02
Payer: COMMERCIAL

## 2025-04-02 DIAGNOSIS — Z34.82 ENCOUNTER FOR SUPERVISION OF OTHER NORMAL PREGNANCY, SECOND TRIMESTER: ICD-10-CM

## 2025-04-02 LAB
BASOPHILS # BLD AUTO: 0.04 THOUSANDS/ÂΜL (ref 0–0.1)
BASOPHILS NFR BLD AUTO: 0 % (ref 0–1)
EOSINOPHIL # BLD AUTO: 0.15 THOUSAND/ÂΜL (ref 0–0.61)
EOSINOPHIL NFR BLD AUTO: 2 % (ref 0–6)
ERYTHROCYTE [DISTWIDTH] IN BLOOD BY AUTOMATED COUNT: 13.9 % (ref 11.6–15.1)
FERRITIN SERPL-MCNC: 6 NG/ML (ref 11–307)
GLUCOSE 1H P 50 G GLC PO SERPL-MCNC: 91 MG/DL (ref 70–134)
HCT VFR BLD AUTO: 32.3 % (ref 34.8–46.1)
HGB BLD-MCNC: 10.3 G/DL (ref 11.5–15.4)
IMM GRANULOCYTES # BLD AUTO: 0.11 THOUSAND/UL (ref 0–0.2)
IMM GRANULOCYTES NFR BLD AUTO: 1 % (ref 0–2)
LYMPHOCYTES # BLD AUTO: 1.37 THOUSANDS/ÂΜL (ref 0.6–4.47)
LYMPHOCYTES NFR BLD AUTO: 14 % (ref 14–44)
MCH RBC QN AUTO: 27.4 PG (ref 26.8–34.3)
MCHC RBC AUTO-ENTMCNC: 31.9 G/DL (ref 31.4–37.4)
MCV RBC AUTO: 86 FL (ref 82–98)
MONOCYTES # BLD AUTO: 0.58 THOUSAND/ÂΜL (ref 0.17–1.22)
MONOCYTES NFR BLD AUTO: 6 % (ref 4–12)
NEUTROPHILS # BLD AUTO: 7.87 THOUSANDS/ÂΜL (ref 1.85–7.62)
NEUTS SEG NFR BLD AUTO: 77 % (ref 43–75)
NRBC BLD AUTO-RTO: 0 /100 WBCS
PLATELET # BLD AUTO: 269 THOUSANDS/UL (ref 149–390)
PMV BLD AUTO: 10.4 FL (ref 8.9–12.7)
RBC # BLD AUTO: 3.76 MILLION/UL (ref 3.81–5.12)
WBC # BLD AUTO: 10.12 THOUSAND/UL (ref 4.31–10.16)

## 2025-04-02 PROCEDURE — 86780 TREPONEMA PALLIDUM: CPT

## 2025-04-02 PROCEDURE — 82950 GLUCOSE TEST: CPT

## 2025-04-02 PROCEDURE — 85025 COMPLETE CBC W/AUTO DIFF WBC: CPT

## 2025-04-02 PROCEDURE — 36415 COLL VENOUS BLD VENIPUNCTURE: CPT

## 2025-04-02 PROCEDURE — 82728 ASSAY OF FERRITIN: CPT

## 2025-04-03 LAB — TREPONEMA PALLIDUM IGG+IGM AB [PRESENCE] IN SERUM OR PLASMA BY IMMUNOASSAY: NORMAL

## 2025-04-04 ENCOUNTER — RESULTS FOLLOW-UP (OUTPATIENT)
Dept: OTHER | Facility: HOSPITAL | Age: 25
End: 2025-04-04

## 2025-04-04 PROBLEM — D50.9 IRON DEFICIENCY ANEMIA DURING PREGNANCY: Status: ACTIVE | Noted: 2025-04-04

## 2025-04-04 PROBLEM — O99.019 IRON DEFICIENCY ANEMIA DURING PREGNANCY: Status: ACTIVE | Noted: 2025-04-04

## 2025-04-16 ENCOUNTER — ROUTINE PRENATAL (OUTPATIENT)
Age: 25
End: 2025-04-16
Payer: COMMERCIAL

## 2025-04-16 VITALS — DIASTOLIC BLOOD PRESSURE: 70 MMHG | SYSTOLIC BLOOD PRESSURE: 126 MMHG | BODY MASS INDEX: 29.12 KG/M2 | WEIGHT: 175 LBS

## 2025-04-16 DIAGNOSIS — O09.899 HISTORY OF PRETERM DELIVERY, CURRENTLY PREGNANT: ICD-10-CM

## 2025-04-16 DIAGNOSIS — O99.343 DEPRESSION DURING PREGNANCY IN THIRD TRIMESTER: ICD-10-CM

## 2025-04-16 DIAGNOSIS — Z3A.29 29 WEEKS GESTATION OF PREGNANCY: ICD-10-CM

## 2025-04-16 DIAGNOSIS — Z23 NEED FOR TDAP VACCINATION: ICD-10-CM

## 2025-04-16 DIAGNOSIS — Z34.83 PRENATAL CARE, SUBSEQUENT PREGNANCY IN THIRD TRIMESTER: Primary | ICD-10-CM

## 2025-04-16 DIAGNOSIS — O09.292 HISTORY OF PRETERM PREMATURE RUPTURE OF MEMBRANES (PROM) IN PREVIOUS PREGNANCY, CURRENTLY PREGNANT IN SECOND TRIMESTER: ICD-10-CM

## 2025-04-16 DIAGNOSIS — O99.019 IRON DEFICIENCY ANEMIA DURING PREGNANCY: ICD-10-CM

## 2025-04-16 DIAGNOSIS — D50.9 IRON DEFICIENCY ANEMIA DURING PREGNANCY: ICD-10-CM

## 2025-04-16 DIAGNOSIS — F32.A DEPRESSION DURING PREGNANCY IN THIRD TRIMESTER: ICD-10-CM

## 2025-04-16 LAB
SL AMB  POCT GLUCOSE, UA: NORMAL
SL AMB POCT URINE PROTEIN: NORMAL

## 2025-04-16 PROCEDURE — 90471 IMMUNIZATION ADMIN: CPT

## 2025-04-16 PROCEDURE — 81002 URINALYSIS NONAUTO W/O SCOPE: CPT

## 2025-04-16 PROCEDURE — PNV

## 2025-04-16 PROCEDURE — 90715 TDAP VACCINE 7 YRS/> IM: CPT

## 2025-04-16 NOTE — PROGRESS NOTES
Patient is here for prenatal ob visit today.  GA: 29w0d  RULA: 2025  GP   Blood type: O+  Denies LOF, VB, CTX  +FM    No question's or concerns at this time.

## 2025-04-16 NOTE — PROGRESS NOTES
Prenatal Visit  Name: Shyam Rizvi      : 2000      MRN: [id number]  Encounter Provider: Natalya Egan PA-C  Encounter Date: 2025   Encounter department: [facility] OBSTETR[facility]ECOLOG[facility]    :  Assessment & Plan  Prenatal care, subsequent pregnancy in third trimester  , 29w0d  No major complaints today.  Reports good FM    Reminded pt to choose a pediatrician if not already done    Contraceptive options were reviewed both hormonal and non-hormonal.  If planning to breastfeed would avoid estrogen containing products in the more immediate postpartum period As this may affect milk supply.    We again reviewed the S/S PTL and importance of consistent/regular FM. Reviewed process for [facility] and encouraged pt to call with any decreases in fetal movement    Return to office in 2 weeks or sooner if needed  Orders:    POCT urine dip    Need for Tdap vaccination    Orders:    TDAP VACCINE GREATER THAN OR EQUAL TO 8YO IM    History of  delivery, currently pregnant         History of  premature rupture of membranes (PROM) in previous pregnancy, currently pregnant in second trimester     Growth U/S 25    29 weeks gestation of pregnancy     Blood Type:. O+  28 Week Labs- iron deficiency anemia     Blue folder- has  Yellow folder- has     NIPS- low risk  AFP- low risk    Level 2- 25 WNL     TDAP - 25     Delivery consent- 25  Breast pump -  has  Pediatrician - has  L&D forms- patient has     Perineal massage -  GBS swab -   IOL -  Continue ASA until 36 weeks    Depression during pregnancy in third trimester    Continue Zoloft 50 mg  Denies SI/HI  Reviewed signs and symptoms of when to go to the ER    Iron deficiency anemia during pregnancy    Patient has not started iron and vitamin C  Repeat CBC in May          History of Present Illness   History of Present Illness     Shyam is a 24 y.o.  29w0d here for No chief complaint on  file.    Denies unusual vaginal discharge, LOF, VB, or ctx. Reports Fetus is active.         Objective   /70 (BP Location: Left arm, Patient Position: Sitting, Cuff Size: Standard)   Wt 79.4 kg (175 lb)   LMP  (LMP Unknown)   BMI 29.12 kg/m²     Pregravid Weight/BMI: 71.2 kg (157 lb) (BMI 26.13)  Total Weight Gain: 8.165 kg (18 lb)   Fetal Heart Rate: 145  Fundal Height (cm): 28 cm         Natalya Egan PA-C   No

## 2025-04-16 NOTE — ASSESSMENT & PLAN NOTE
Blood Type:. O+  28 Week Labs- iron deficiency anemia     Blue folder- has  Yellow folder- has     NIPS- low risk  AFP- low risk    Level 2- 2/13/25 WNL     TDAP - 4/16/25     Delivery consent- 4/16/25  Breast pump -  has  Pediatrician - has  L&D forms- patient has     Perineal massage -  GBS swab -   IOL -  Continue ASA until 36 weeks

## 2025-04-18 LAB
DME PARACHUTE DELIVERY DATE ACTUAL: NORMAL
DME PARACHUTE DELIVERY DATE REQUESTED: NORMAL
DME PARACHUTE ITEM DESCRIPTION: NORMAL
DME PARACHUTE ORDER STATUS: NORMAL
DME PARACHUTE SUPPLIER NAME: NORMAL
DME PARACHUTE SUPPLIER PHONE: NORMAL

## 2025-04-23 DIAGNOSIS — F32.9 REACTIVE DEPRESSION: ICD-10-CM

## 2025-04-23 PROBLEM — Z3A.30 30 WEEKS GESTATION OF PREGNANCY: Status: ACTIVE | Noted: 2025-01-24

## 2025-04-23 PROBLEM — Z30.09 CONTRACEPTIVE EDUCATION: Status: RESOLVED | Noted: 2025-04-01 | Resolved: 2025-04-23

## 2025-04-23 NOTE — PROGRESS NOTES
Patient reached out via [employer] for a request of an increase in her Zoloft dose.  I advised the patient to increase dose from 50 mg to 75 mg.  A refill was provided

## 2025-05-02 ENCOUNTER — APPOINTMENT (EMERGENCY)
Dept: RADIOLOGY | Facility: HOSPITAL | Age: 25
End: 2025-05-02
Payer: COMMERCIAL

## 2025-05-02 ENCOUNTER — NURSE TRIAGE (OUTPATIENT)
Age: 25
End: 2025-05-02

## 2025-05-02 ENCOUNTER — HOSPITAL ENCOUNTER (EMERGENCY)
Facility: HOSPITAL | Age: 25
Discharge: HOME | End: 2025-05-02
Attending: EMERGENCY MEDICINE | Admitting: STUDENT IN AN ORGANIZED HEALTH CARE EDUCATION/TRAINING PROGRAM
Payer: COMMERCIAL

## 2025-05-02 VITALS
TEMPERATURE: 98.2 F | WEIGHT: 175 LBS | SYSTOLIC BLOOD PRESSURE: 95 MMHG | DIASTOLIC BLOOD PRESSURE: 63 MMHG | HEIGHT: 65 IN | OXYGEN SATURATION: 98 % | BODY MASS INDEX: 29.16 KG/M2 | RESPIRATION RATE: 16 BRPM | HEART RATE: 95 BPM

## 2025-05-02 DIAGNOSIS — R06.02 SHORTNESS OF BREATH: Primary | ICD-10-CM

## 2025-05-02 PROBLEM — Z3A.31 31 WEEKS GESTATION OF PREGNANCY: Status: ACTIVE | Noted: 2025-01-24

## 2025-05-02 LAB
ANION GAP SERPL CALCULATED.3IONS-SCNC: 7 MMOL/L (ref 4–13)
ANISOCYTOSIS BLD QL SMEAR: PRESENT
BASOPHILS # BLD MANUAL: 0 THOUSAND/UL (ref 0–0.1)
BASOPHILS NFR MAR MANUAL: 0 % (ref 0–1)
BUN SERPL-MCNC: 8 MG/DL (ref 5–25)
CALCIUM SERPL-MCNC: 8.6 MG/DL (ref 8.4–10.2)
CARDIAC TROPONIN I PNL SERPL HS: <2 NG/L (ref ?–50)
CHLORIDE SERPL-SCNC: 106 MMOL/L (ref 96–108)
CO2 SERPL-SCNC: 22 MMOL/L (ref 21–32)
CREAT SERPL-MCNC: 0.41 MG/DL (ref 0.6–1.3)
D DIMER PPP FEU-MCNC: 0.84 UG/ML FEU
EOSINOPHIL # BLD MANUAL: 0.62 THOUSAND/UL (ref 0–0.4)
EOSINOPHIL NFR BLD MANUAL: 5 % (ref 0–6)
ERYTHROCYTE [DISTWIDTH] IN BLOOD BY AUTOMATED COUNT: 13.9 % (ref 11.6–15.1)
GFR SERPL CREATININE-BSD FRML MDRD: 144 ML/MIN/1.73SQ M
GLUCOSE SERPL-MCNC: 99 MG/DL (ref 65–140)
HCT VFR BLD AUTO: 29.1 % (ref 34.8–46.1)
HGB BLD-MCNC: 9.3 G/DL (ref 11.5–15.4)
LG PLATELETS BLD QL SMEAR: PRESENT
LYMPHOCYTES # BLD AUTO: 18 % (ref 14–44)
LYMPHOCYTES # BLD AUTO: 2.22 THOUSAND/UL (ref 0.6–4.47)
MCH RBC QN AUTO: 26.4 PG (ref 26.8–34.3)
MCHC RBC AUTO-ENTMCNC: 32 G/DL (ref 31.4–37.4)
MCV RBC AUTO: 83 FL (ref 82–98)
MONOCYTES # BLD AUTO: 0.99 THOUSAND/UL (ref 0–1.22)
MONOCYTES NFR BLD: 8 % (ref 4–12)
MYELOCYTE ABSOLUTE CT: 0.12 THOUSAND/UL (ref 0–0.1)
MYELOCYTES NFR BLD MANUAL: 1 % (ref 0–1)
NEUTROPHILS # BLD MANUAL: 8.38 THOUSAND/UL (ref 1.85–7.62)
NEUTS SEG NFR BLD AUTO: 68 % (ref 43–75)
PLATELET # BLD AUTO: 285 THOUSANDS/UL (ref 149–390)
PLATELET BLD QL SMEAR: ADEQUATE
PMV BLD AUTO: 10 FL (ref 8.9–12.7)
POTASSIUM SERPL-SCNC: 3.5 MMOL/L (ref 3.5–5.3)
RBC # BLD AUTO: 3.52 MILLION/UL (ref 3.81–5.12)
RBC MORPH BLD: PRESENT
SODIUM SERPL-SCNC: 135 MMOL/L (ref 135–147)
WBC # BLD AUTO: 12.33 THOUSAND/UL (ref 4.31–10.16)

## 2025-05-02 PROCEDURE — 93005 ELECTROCARDIOGRAM TRACING: CPT

## 2025-05-02 PROCEDURE — 85379 FIBRIN DEGRADATION QUANT: CPT

## 2025-05-02 PROCEDURE — 85027 COMPLETE CBC AUTOMATED: CPT

## 2025-05-02 PROCEDURE — 85007 BL SMEAR W/DIFF WBC COUNT: CPT

## 2025-05-02 PROCEDURE — 99213 OFFICE O/P EST LOW 20 MIN: CPT

## 2025-05-02 PROCEDURE — 76815 OB US LIMITED FETUS(S): CPT | Performed by: EMERGENCY MEDICINE

## 2025-05-02 PROCEDURE — 84484 ASSAY OF TROPONIN QUANT: CPT

## 2025-05-02 PROCEDURE — 99285 EMERGENCY DEPT VISIT HI MDM: CPT

## 2025-05-02 PROCEDURE — 71046 X-RAY EXAM CHEST 2 VIEWS: CPT

## 2025-05-02 PROCEDURE — NC001 PR NO CHARGE: Performed by: STUDENT IN AN ORGANIZED HEALTH CARE EDUCATION/TRAINING PROGRAM

## 2025-05-02 PROCEDURE — 99285 EMERGENCY DEPT VISIT HI MDM: CPT | Performed by: EMERGENCY MEDICINE

## 2025-05-02 PROCEDURE — 80048 BASIC METABOLIC PNL TOTAL CA: CPT

## 2025-05-02 PROCEDURE — 36415 COLL VENOUS BLD VENIPUNCTURE: CPT

## 2025-05-02 RX ORDER — ONDANSETRON 4 MG/1
4 TABLET, ORALLY DISINTEGRATING ORAL ONCE
Status: COMPLETED | OUTPATIENT
Start: 2025-05-02 | End: 2025-05-02

## 2025-05-02 RX ADMIN — ONDANSETRON 4 MG: 4 TABLET, ORALLY DISINTEGRATING ORAL at 17:28

## 2025-05-02 NOTE — ED PROVIDER NOTES
ED Disposition       None          Assessment & Plan   {Hyperlinks  Risk Stratification - NIHSS - HEART SCORE - Fill out sepsis note and make sure you call [phone] if severe or septic shock:[id number]}    Medical Decision Making  Amount and/or Complexity of Data Reviewed  Labs: ordered.  Radiology: ordered.        ED Course as of 05/02/25 1654   Fri May 02, 2025   1654  bpm         Medications - No data to display    ED Risk Strat Scores                    No data recorded        SBIRT 20yo+      Flowsheet Row Most Recent Value   Initial Alcohol Screen: US AUDIT-C     1. How often do you have a drink containing alcohol? 0 Filed at: 05/02/2025 1616   2. How many drinks containing alcohol do you have on a typical day you are drinking?  0 Filed at: 05/02/2025 1616   3a. Male UNDER 65: How often do you have five or more drinks on one occasion? 0 Filed at: 05/02/2025 1616   3b. FEMALE Any Age, or MALE 65+: How often do you have 4 or more drinks on one occassion? 0 Filed at: 05/02/2025 1616   Audit-C Score 0 Filed at: 05/02/2025 1616   FIDELINA: How many times in the past year have you...    Used an illegal drug or used a prescription medication for non-medical reasons? Never Filed at: 05/02/2025 1616                            History of Present Illness   {Hyperlinks  History (Med, Surg, Fam, Social) - Current Medications - Allergies  :[id number]}    Chief Complaint   Patient presents with    Shortness of Breath     Patient states that she started with SOB and increase in HR while just sitting still, stated that this started yesterday.OB told her to go to L&D. L&D stated they will do NST after being evaluated in ED. Denies any CP and states she is still feeling baby move around as normal.       Past Medical History:   Diagnosis Date    Anemia     Chlamydia 2019    COVID-19 11/26/2022 2024    Depression     Gonorrhea     2019   tx'd    Migraine     Sickle cell trait (HCC)     Varicella     childhood chickenpox       Past Surgical History:   Procedure Laterality Date    NO PAST SURGERIES        Family History   Problem Relation Age of Onset    Mental illness Mother     Depression Mother     Gout Mother     Sickle cell trait Mother     Sickle cell trait Father     Pulmonary embolism Father     Hypertension Half-Brother     Panic disorder Half-Brother     Depression Half-Brother     Stroke Maternal Grandmother     Diabetes Maternal Grandmother     Alcohol abuse Maternal Grandfather     Cancer Paternal Grandmother     Cancer Paternal Grandfather     Breast cancer Cousin     Schizophrenia Half-Brother     Sickle cell trait Half-Brother     Colon cancer Neg Hx     Ovarian cancer Neg Hx     Uterine cancer Neg Hx     Cervical cancer Neg Hx       Social History     Tobacco Use    Smoking status: Never    Smokeless tobacco: Never   Vaping Use    Vaping status: Never Used   Substance Use Topics    Alcohol use: Not Currently     Comment: nothing after 1st month of pregnancy    Drug use: Not Currently     Types: Marijuana     Comment: marijuana-last used -FOB-marijuana; denies parent use- fob's uncle-heroin      E-Cigarette/Vaping    E-Cigarette Use Never User       E-Cigarette/Vaping Substances    Nicotine No     THC No     CBD No     Flavoring No     Other No     Unknown No       I have reviewed and agree with the history as documented.     23 yo female presents for evaluation of SOB. She is 31w2d, , and reports 2 days of SOB, including at rest. She also reports cough for about a week, palpitations, and allergic rhinitis, which is common for her. No associated chest pain, fever, nausea, diaphoresis. Normal PO intake, no vomiting. No dysuria, or hematuria.       Shortness of Breath      Review of Systems   Respiratory:  Positive for shortness of breath.            Objective   {Hyperlinks  Historical Vitals - Historical Labs - Chart Review/Microbiology - Last Echo - Code Status  :[id number]}    ED Triage Vitals [25 1614]    Temperature Pulse Blood Pressure Respirations SpO2 Patient Position - Orthostatic VS   98.5 °F (36.9 °C) 100 112/61 18 98 % Sitting      Temp Source Heart Rate Source BP Location FiO2 (%) Pain Score    Oral Monitor Right arm -- --      Vitals      Date and Time Temp Pulse SpO2 Resp BP Pain Score FACES Pain Rating User   05/02/25 1614 98.5 °F (36.9 °C) 100 98 % 18 112/61 -- -- TS            Physical Exam    Results Reviewed       None            No orders to display       POC Pelvic US    Date/Time: 5/2/2025 4:54 PM    Performed by: Lex Malin MD  Authorized by: Lex Malin MD        ED Medication and Procedure Management   Prior to Admission Medications   Prescriptions Last Dose Informant Patient Reported? Taking?   Prenatal Vit-Fe Fumarate-FA (PRENATAL PO)  Self Yes No   Sig: Take by mouth   acetaminophen (TYLENOL) 650 mg CR tablet  Self No No   Sig: Take 1 tablet (650 mg total) by mouth every 8 (eight) hours as needed for mild pain   aspirin (ECOTRIN LOW STRENGTH) 81 mg EC tablet  Self No No   Sig: Take 2 tablets (162 mg total) by mouth daily Stop at 36 weeks.  Contact your OB or MFM with any side effects.  See [email or web address]://www.preeclampsia.org/[email or web address]   ondansetron (ZOFRAN-ODT) 4 mg disintegrating tablet   No No   Sig: Take 1 tablet (4 mg total) by mouth every 8 (eight) hours as needed for nausea or vomiting   sertraline (Zoloft) 50 mg tablet   No No   Sig: Take 1.5 tablets (75 mg total) by mouth daily      Facility-Administered Medications: None     Patient's Medications   Discharge Prescriptions    No medications on file     No discharge procedures on file.  ED SEPSIS DOCUMENTATION          Position - Orthostatic VS   05/02/25 1614 05/02/25 1614 05/02/25 1614 05/02/25 1614 05/02/25 1614 05/02/25 1614   98.5 °F (36.9 °C) 100 112/61 18 98 % Sitting      Temp Source Heart Rate Source BP Location FiO2 (%) Pain Score    05/02/25 1614 05/02/25 1614 05/02/25 1614 -- 05/02/25 1949    Oral Monitor Right arm  4      Vitals      Date and Time Temp Pulse SpO2 Resp BP Pain Score FACES Pain Rating User   05/02/25 2000 -- 95 98 % -- -- -- -- [facility]   05/02/25 1958 -- 91 -- -- -- -- -- [facility]   05/02/25 1955 -- 90 98 % -- -- -- -- [facility]   05/02/25 1954 -- 92 -- -- -- -- -- [facility]   05/02/25 1950 -- -- 99 % -- -- -- -- [facility]   05/02/25 1949 98.2 °F (36.8 °C) 88 98 % 16 95/63 4 -- [facility]   05/02/25 1614 98.5 °F (36.9 °C) 100 98 % 18 112/61 -- -- TS            Physical Exam  Vitals and nursing note reviewed.   Constitutional:       General: She is not in acute distress.     Appearance: She is well-developed.   HENT:      Head: Normocephalic and atraumatic.   Eyes:      Conjunctiva/sclera: Conjunctivae normal.   Cardiovascular:      Rate and Rhythm: Normal rate and regular rhythm.      Heart sounds: No murmur heard.  Pulmonary:      Effort: Pulmonary effort is normal. No respiratory distress.      Breath sounds: Normal breath sounds. No decreased breath sounds.   Chest:      Chest wall: No tenderness.   Abdominal:      Palpations: Abdomen is soft.      Tenderness: There is no abdominal tenderness.      Comments: Appropriately gravid abdomen, fundus of uterus above the umbilicus.    Musculoskeletal:         General: No swelling.      Cervical back: Neck supple.   Skin:     General: Skin is warm and dry.      Capillary Refill: Capillary refill takes less than 2 seconds.   Neurological:      Mental Status: She is alert.   Psychiatric:         Mood and Affect: Mood normal.         Results Reviewed       Procedure Component Value Units Date/Time    HS Troponin 0hr (reflex protocol) [[id number]]  (Normal) Collected: 05/02/25 7219    Lab Status:  Final result Specimen: Blood from Arm, Right Updated: 05/02/25 1812     hs TnI 0hr <2 ng/L     D-dimer, quantitative [[id number]]  (Abnormal) Collected: 05/02/25 1744    Lab Status: Final result Specimen: Blood from Arm, Right Updated: 05/02/25 1807     D-Dimer, Quant 0.84 ug/ml FEU     RBC Morphology Reflex Test [[id number]] Collected: 05/02/25 1707    Lab Status: Final result Specimen: Blood from Arm, Right Updated: 05/02/25 1801    CBC and differential [[id number]]  (Abnormal) Collected: 05/02/25 1707    Lab Status: Final result Specimen: Blood from Arm, Right Updated: 05/02/25 1758     WBC 12.33 Thousand/uL      RBC 3.52 Million/uL      Hemoglobin 9.3 g/dL      Hematocrit 29.1 %      MCV 83 fL      MCH 26.4 pg      MCHC 32.0 g/dL      RDW 13.9 %      MPV 10.0 fL      Platelets 285 Thousands/uL     Narrative:      This is an appended report.  These results have been appended to a previously verified report.    Manual Differential(PHLEBS Do Not Order) [[id number]]  (Abnormal) Collected: 05/02/25 1707    Lab Status: Final result Specimen: Blood from Arm, Right Updated: 05/02/25 1758     Segmented % 68 %      Lymphocytes % 18 %      Monocytes % 8 %      Eosinophils % 5 %      Basophils % 0 %      Myelocytes % 1 %      Absolute Neutrophils 8.38 Thousand/uL      Absolute Lymphocytes 2.22 Thousand/uL      Absolute Monocytes 0.99 Thousand/uL      Absolute Eosinophils 0.62 Thousand/uL      Absolute Basophils 0.00 Thousand/uL      Absolute Myelocytes 0.12 Thousand/uL      Total Counted --     RBC Morphology Present     Platelet Estimate Adequate     Large Platelet Present     Anisocytosis Present    Basic metabolic panel [[id number]]  (Abnormal) Collected: 05/02/25 1707    Lab Status: Final result Specimen: Blood from Arm, Right Updated: 05/02/25 1734     Sodium 135 mmol/L      Potassium 3.5 mmol/L      Chloride 106 mmol/L      CO2 22 mmol/L      ANION GAP 7 mmol/L      BUN 8 mg/dL      Creatinine 0.41 mg/dL      Glucose 99  mg/dL      Calcium 8.6 mg/dL      eGFR 144 ml/min/1.73sq m     Narrative:      National Kidney Disease Foundation guidelines for Chronic Kidney Disease (CKD):     Stage 1 with normal or high GFR (GFR > 90 mL/min/1.73 square meters)    Stage 2 Mild CKD (GFR = 60-89 mL/min/1.73 square meters)    Stage 3A Moderate CKD (GFR = 45-59 mL/min/1.73 square meters)    Stage 3B Moderate CKD (GFR = 30-44 mL/min/1.73 square meters)    Stage 4 Severe CKD (GFR = 15-29 mL/min/1.73 square meters)    Stage 5 End Stage CKD (GFR <15 mL/min/1.73 square meters)  Note: GFR calculation is accurate only with a steady state creatinine            XR chest 2 views   Final Interpretation by Arias Tran MD (05/02 1723)      No acute cardiopulmonary disease.            Workstation performed: [id number]             POC Pelvic US    Date/Time: 5/2/2025 4:54 PM    Performed by: Lex Malin MD  Authorized by: Lex Malin MD    Patient location:  ED  Procedure details:     Indications: evaluate for IUP      Assessment for: evaluate fetal viability      Views obtained: uterus (transverse and sagittal)      Image quality: diagnostic      Image availability:  Images available in PACS  Uterine findings:     Intrauterine pregnancy: identified      Single gestation: identified      Fetal heart rate (bpm):  147  Interpretation:     Ultrasound impressions: normal      Pregnancy findings: intrauterine pregnancy (IUP)        ED Medication and Procedure Management   Prior to Admission Medications   Prescriptions Last Dose Informant Patient Reported? Taking?   Prenatal Vit-Fe Fumarate-FA (PRENATAL PO) Past Week Self Yes Yes   Sig: Take by mouth   acetaminophen (TYLENOL) 650 mg CR tablet 5/1/2025 Self No Yes   Sig: Take 1 tablet (650 mg total) by mouth every 8 (eight) hours as needed for mild pain   aspirin (ECOTRIN LOW STRENGTH) 81 mg EC tablet 5/1/2025 Self No Yes   Sig: Take 2 tablets (162 mg total) by mouth daily Stop at 36 weeks.  Contact  your OB or MFM with any side effects.  See [email or web address]://www.preeclampsia.org/[email or web address]   ondansetron (ZOFRAN-ODT) 4 mg disintegrating tablet   No No   Sig: Take 1 tablet (4 mg total) by mouth every 8 (eight) hours as needed for nausea or vomiting   sertraline (Zoloft) 50 mg tablet 5/1/2025  No Yes   Sig: Take 1.5 tablets (75 mg total) by mouth daily      Facility-Administered Medications: None     Discharge Medication List as of 5/2/2025  8:37 PM        CONTINUE these medications which have NOT CHANGED    Details   acetaminophen (TYLENOL) 650 mg CR tablet Take 1 tablet (650 mg total) by mouth every 8 (eight) hours as needed for mild pain, Starting Mon 12/9/2024, Normal      aspirin (ECOTRIN LOW STRENGTH) 81 mg EC tablet Take 2 tablets (162 mg total) by mouth daily Stop at 36 weeks.  Contact your OB or MFM with any side effects.  See [email or web address]://www.preeclampsia.org/[email or web address], Starting Thu 12/19/2024, Normal      Prenatal Vit-Fe Fumarate-FA (PRENATAL PO) Take by mouth, Historical Med      sertraline (Zoloft) 50 mg tablet Take 1.5 tablets (75 mg total) by mouth daily, Starting Wed 4/23/2025, Normal      ondansetron (ZOFRAN-ODT) 4 mg disintegrating tablet Take 1 tablet (4 mg total) by mouth every 8 (eight) hours as needed for nausea or vomiting, Starting Tue 2/4/2025, Until Wed 4/16/2025 at 2359, Normal           No discharge procedures on file.  ED SEPSIS DOCUMENTATION   Time reflects when diagnosis was documented in both MDM as applicable and the Disposition within this note       Time User Action Codes Description Comment    5/2/2025  6:54 PM Lex Malin Add [R06.02] Shortness of breath                  Lex Malin MD  05/05/25 1950

## 2025-05-02 NOTE — ED NOTES
Ambulatory pulse ox: O2 sat 97% room air. HR 98-105bpm. Steady gait, no distress noted. Provider aware.       Mariana Gilmore RN  05/02/25 8331

## 2025-05-02 NOTE — TELEPHONE ENCOUNTER
"FOLLOW UP:   Epic Secure Chat sent to Dr Alta Rodriguez on call  Epic Secure Chat sent to Trang Reis L&D RN     REASON FOR CONVERSATION: Pregnancy Problem    SYMPTOMS: shortness of breath, headache, cramping in lower abdomen, visual disturbances.       OTHER:   Pt calling in saying that she's 31w2d , pt c/o having a headache 5/10 sharp pain in the back of her head. Pt hasn't taken tylenol since yesterday due to it \"not working\".  Pt is also c/o shortness of breath and elevated HR. Pt saying that the headache started yesterday as well as the Shortness of breath comes and goes but is currently there and feels like something is sitting on her chest. Pt has history of migraines.   Pt reporting elevated HR, and blurred vision at times, shortness of breath. Pt reporting +FM. Pt reporting cramping inconsistently 4/10 in lower abdomen.  Pt denies fever, stiff neck, swelling of hands face or feet.     DISPOSITION: Go to L&D now       Reason for Disposition   Pregnant 20 or more weeks and new blurred vision or vision changes    Answer Assessment - Initial Assessment Questions  1. LOCATION: \"Where does it hurt?\"       Back of head   2. ONSET: \"When did the headache start?\" (e.g., minutes, hours or days)       Yesterday   3. PATTERN: \"Does the pain come and go, or has it been constant since it started?\"      Comes and goes   4. SEVERITY: \"How bad is the pain?\" and \"What does it keep you from doing?\"       5/10 sharp pain   5. RECURRENT SYMPTOM: \"Have you ever had headaches before?\" If Yes, ask: \"When was the last time?\" and \"What happened that time?\"       Pt  says yes   6. CAUSE: \"What do you think is causing the headache?\"      Migraines before pregnancy   7. MIGRAINE: \"Have you been diagnosed with migraine headaches?\" If Yes, ask: \"Is this headache similar?\"       Yes   8. HEAD INJURY: \"Has there been any recent injury to the head?\"       Pt denies   9. OTHER SYMPTOMS: \"Do you have any other symptoms?\" (e.g., " "abdomen pain, blurred vision, fever, stiff neck; swelling of hands, face, or feet)      Pt reporting elevated HR, and blurred vision at times, shortness of breath. Pt denies fever, stiff neck, swelling of hands face or feet.   10. PREGNANCY: \"How many weeks pregnant are you?\"        31w2d   11. RULA: \"What date are you expecting to deliver?\"        7/2/25    Protocols used: Pregnancy - Headache-Adult-[place]    "

## 2025-05-03 LAB
ATRIAL RATE: 88 BPM
P AXIS: 10 DEGREES
PR INTERVAL: 166 MS
QRS AXIS: 67 DEGREES
QRSD INTERVAL: 76 MS
QT INTERVAL: 356 MS
QTC INTERVAL: 430 MS
T WAVE AXIS: 36 DEGREES
VENTRICULAR RATE: 88 BPM

## 2025-05-03 PROCEDURE — 93010 ELECTROCARDIOGRAM REPORT: CPT | Performed by: INTERNAL MEDICINE

## 2025-05-03 NOTE — PROGRESS NOTES
"Progress Note - OB/GYN   Name: Shyam Rizvi 24 y.o. female I MRN: [id number]  Unit/Bed#:  TRI[facility] 3-01 I Date of Admission: 2025   Date of Service: 2025 I Hospital Day: 0     Assessment & Plan  31 weeks gestation of pregnancy  Shyam Rizvi is 25 yo  at 31w2d   She was seen in ED for SOB with a negative workup  She reports resolution of symptoms and reported to triage for NST  NST reactive: baseline 150 bpm, moderate variability, 10x10 accels, no decels  No contractions  No obstetric complaints    NST reviewed with Dr. Barlow    Vitals:  BP 95/63   Pulse 95   Temp 98.2 °F (36.8 °C) (Oral)   Resp 16   Ht 5' 5\" (1.651 m)   Wt 79.4 kg (175 lb)   LMP  (LMP Unknown)   SpO2 98%   BMI 29.12 kg/m²   Body mass index is 29.12 kg/m².    FHT:  Baseline Rate (FHR): 150 bpm  Variability: Moderate  Accelerations: 10 x 10 (<32 weeks)  Decelerations: Variable, Stan not <80 bpm, For < 60 seconds    TOCO:   Contraction Frequency (minutes): 0      Labs:   Recent Results (from the past 24 hours)   CBC and differential    Collection Time: 25  5:07 PM   Result Value Ref Range    WBC 12.33 (H) 4.31 - 10.16 Thousand/uL    RBC 3.52 (L) 3.81 - 5.12 Million/uL    Hemoglobin 9.3 (L) 11.5 - 15.4 g/dL    Hematocrit 29.1 (L) 34.8 - 46.1 %    MCV 83 82 - 98 fL    MCH 26.4 (L) 26.8 - 34.3 pg    MCHC 32.0 31.4 - 37.4 g/dL    RDW 13.9 11.6 - 15.1 %    MPV 10.0 8.9 - 12.7 fL    Platelets 285 149 - 390 Thousands/uL   Basic metabolic panel    Collection Time: 25  5:07 PM   Result Value Ref Range    Sodium 135 135 - 147 mmol/L    Potassium 3.5 3.5 - 5.3 mmol/L    Chloride 106 96 - 108 mmol/L    CO2 22 21 - 32 mmol/L    ANION GAP 7 4 - 13 mmol/L    BUN 8 5 - 25 mg/dL    Creatinine 0.41 (L) 0.60 - 1.30 mg/dL    Glucose 99 65 - 140 mg/dL    Calcium 8.6 8.4 - 10.2 mg/dL    eGFR 144 ml/min/1.73sq m   HS Troponin 0hr (reflex protocol)    Collection Time: 25  5:07 PM   Result Value Ref Range    hs TnI 0hr <2 \"Refer to " "ACS Flowchart\"- see link ng/L   Manual Differential(PHLEBS Do Not Order)    Collection Time: 05/02/25  5:07 PM   Result Value Ref Range    Segmented % 68 43 - 75 %    Lymphocytes % 18 14 - 44 %    Monocytes % 8 4 - 12 %    Eosinophils % 5 0 - 6 %    Basophils % 0 0 - 1 %    Myelocytes % 1 0 - 1 %    Absolute Neutrophils 8.38 (H) 1.85 - 7.62 Thousand/uL    Absolute Lymphocytes 2.22 0.60 - 4.47 Thousand/uL    Absolute Monocytes 0.99 0.00 - 1.22 Thousand/uL    Absolute Eosinophils 0.62 (H) 0.00 - 0.40 Thousand/uL    Absolute Basophils 0.00 0.00 - 0.10 Thousand/uL    Absolute Myelocytes 0.12 (H) 0.00 - 0.10 Thousand/uL    Total Counted      RBC Morphology Present     Platelet Estimate Adequate Adequate    Large Platelet Present     Anisocytosis Present    ECG 12 lead    Collection Time: 05/02/25  5:20 PM   Result Value Ref Range    Ventricular Rate 88 BPM    Atrial Rate 88 BPM    OK Interval 166 ms    QRSD Interval 76 ms    QT Interval 356 ms    QTC Interval 430 ms    P [place] 10 degrees    QRS Axis 67 degrees    T Wave Axis 36 degrees   D-dimer, quantitative    Collection Time: 05/02/25  5:44 PM   Result Value Ref Range    D-Dimer, Quant 0.84 (H) <0.50 ug/ml MACIE Lind MD  5/2/2025  9:47 PM      "

## 2025-05-03 NOTE — ASSESSMENT & PLAN NOTE
Shyam Rizvi is 23 yo  at 31w2d   She was seen in ED for SOB with a negative workup  She reports resolution of symptoms and reported to triage for NST  NST reactive: baseline 150 bpm, moderate variability, 10x10 accels, no decels  No contractions  No obstetric complaints

## 2025-05-03 NOTE — ED ATTENDING ATTESTATION
2025  I, Shreya Mcqueen MD, saw and evaluated the patient. I have discussed the patient with the resident/non-physician practitioner and agree with the resident's/non-physician practitioner's findings, Plan of Care, and MDM as documented in the resident's/non-physician practitioner's note, except where noted. All available labs and Radiology studies were reviewed.  I was present for key portions of any procedure(s) performed by the resident/non-physician practitioner and I was immediately available to provide assistance.       At this point I agree with the current assessment done in the Emergency Department.  I have conducted an independent evaluation of this patient a history and physical is as follows:    23yo female  @ 31 weeks presents with intermittent palpitations and dyspnea.  Reports three episodes of palpitations associated with SOB while at rest.  Episodes last 10-15 minutes, self resolve.  Not associated with exertion.  Not positional.    Denies f/c/chest pain/pressure/LE pain/swelling/recent travel or surgery.  Denies personal or familial history of VTE.  On exam pt well appearing, clear lungs, no murmur, pulse differential, clinical signs of VTE or volume overload.  No LE pain/swelling.  EKG reassuring, with arrhythmia or signs of ischemia.  CXR unremarkable.e  Trop reassuring.  D-Dimer negative by YEARS criteria.  Pt safe for dispo to OB/triage for NST.  Low suspicion for VTE, ACS, pericarditis given intermittent nature of symptoms.     ED Course  ED Course as of 05/02/25 2036   Fri May 02, 2025   1726 XR chest 2 views  IMPRESSION:     No acute cardiopulmonary disease.           Workstation performed: [id number]         Exam Ended: 25 17:14       1744 Basic metabolic panel(!)  Reassuring, no end organ damage, no AG, normal bicarb.       1802 CBC and differential(!)  Mild luekoctyosis without banding, anemia noted down 9.3 down from 10.1 previously, normal plts   1811 D-Dimer,  Quant(!): 0.84    YEARS Algorithm for Pulmonary Embolism (PE) from "[employer]  on 5/2/2025  ** All calculations should be rechecked by clinician prior to use **    RESULT SUMMARY:  PE excluded   YEARS algorithm rules out PE (0.43% with symptomatic VTE during 3-month follow-up)      INPUTS:  Pregnant patient -> 1 = Yes  Clinical signs of DVT -> 0 = No  Hemoptysis -> 0 = No  PE most likely diagnosis -> 0 = No  D-dimer >=1,000 ng/mL FEU -> 0 = No     1814 hs TnI 0hr: <2  wnl         Critical Care Time  Procedures

## 2025-05-06 NOTE — PATIENT INSTRUCTIONS
"Patient Education     Your baby's movement before birth   The Basics   Written by the doctors and editors at [facility]   When should I start feeling my baby move? -- It depends. Most people first feel their baby moving in the uterus between about 16 and 20 weeks of pregnancy. It might take longer to feel movement if this is your first pregnancy or if the placenta is in the front of your uterus.  What kinds of movements should I feel? -- When you first feel your baby move, it might feel like a gentle flutter in your belly. This is sometimes called \"quickening.\" As the baby grows, their movements will get stronger. You will probably feel them kicking, rolling, and stretching. Later in pregnancy, you might be able to see and feel the baby moving from the outside.  You might notice that your baby is more active at certain times of the day or night. Even before birth, babies have periods of being asleep and awake. When your baby is sleeping, you might notice that they do not move as much.  Should I keep track of my baby's movements? -- If your pregnancy is healthy, you probably do not need to count or record your baby's movements. Feeling regular movement is a good sign that the baby is doing well.  In some cases, your doctor or midwife might ask you to keep track of your baby's movements. If so, they will tell you how to do this and when to call them.  A change in your baby's movements does not always mean that there is a problem. But in some cases, it can be a sign that the baby is having trouble. If your doctor or midwife is concerned, they can do tests to check on the baby.  If I am asked to track movement, how should I do it? -- There are different ways of tracking your baby's movement. This is sometimes called \"kick counting.\"  Your doctor or midwife will tell you exactly what to track. For example, they might ask you to write down:   How long it takes to feel 10 kicks or movements   How many times your baby moves " in 1 hour  Many experts consider at least 10 movements in 2 hours to be a sign that the baby is doing well. But there is no specific cutoff for exactly how much movement is healthy or unhealthy. Some babies are more active than others, and some pregnant people feel movement more easily than others. The main goal of kick counting is to get to know your baby's normal patterns so you can tell if anything changes.  If you are doing kick counting:   Choose a time of day when your baby is usually active.   Find a quiet place where you will not be distracted.   Lie down on your side in a comfortable position.   Check the clock, or set a timer.   Each time you feel your baby move or kick, write down the time. Some people use a smartphone comfort to keep track.   If your baby seems less active than usual, try moving around, eating a snack, and emptying your bladder. This can help wake the baby up if they are asleep.   Stop counting after you have felt 10 kicks, or after the length of time your doctor or midwife told you.  When should I call the doctor? -- Call your doctor or midwife for advice if:   You have concerns about your baby's movement.   Your baby is moving less than they normally do.   You notice a sudden change in the pattern of your baby's movements.   You have any other symptoms that worry you.  All topics are updated as new evidence becomes available and our peer review process is complete.  This topic retrieved from [employer] on: Feb 26, 2024.  Topic 653407 Version 1.0  Release: 32.2.4 - C32.56  © 2024 UpToDate, Inc. and/or its affiliates. All rights reserved.  Consumer Information Use and Disclaimer   Disclaimer: This generalized information is a limited summary of diagnosis, treatment, and/or medication information. It is not meant to be comprehensive and should be used as a tool to help the user understand and/or assess potential diagnostic and treatment options. It does NOT include all information about  conditions, treatments, medications, side effects, or risks that may apply to a specific patient. It is not intended to be medical advice or a substitute for the medical advice, diagnosis, or treatment of a health care provider based on the health care provider's examination and assessment of a patient's specific and unique circumstances. Patients must speak with a health care provider for complete information about their health, medical questions, and treatment options, including any risks or benefits regarding use of medications. This information does not endorse any treatments or medications as safe, effective, or approved for treating a specific patient. UpToDate, Inc. and its affiliates disclaim any warranty or liability relating to this information or the use thereof.The use of this information is governed by the Terms of Use, available at https://www.[employer].com/en/know/clinical-effectiveness-terms. © UpToDate, Inc. and its affiliates and/or licensors. All rights reserved.  Copyright   ©  UpToDate, Inc. and/or its affiliates. All rights reserved.  Thank you for choosing us for your  care today.  If you have any questions about your ultrasound or care, please do not hesitate to contact us or your primary obstetrician.        Some general instructions for your pregnancy are:    Exercise: Aim for 150 minutes per week of regular exercise.  Walking is great!  Nutrition: Choose healthy sources of calcium, iron, and protein.  Avoid ultraprocessed foods and added sugar.  Learn about Preeclampsia: preeclampsia is a common, potentially serious high blood pressure complication in pregnancy.  A blood pressure of 140mmHg (systolic or top number) or 90mmHg (diastolic or bottom number) should be evaluated by your doctor.  Aspirin is sometimes prescribed in early pregnancy to prevent preeclampsia in women with risk factors - ask your obstetrician if you should be on this medication.  For more  resources, visit:  https://www.highriskpregnancyinfo.org/preeclampsia  If you smoke, please try to quit completely but also try to reduce your smoking by as much as possible (as soon as possible).  Do not vape.  Please also avoid cannabis products.  Other warning signs to watch out for in pregnancy or postpartum: chest pain, obstructed breathing or shortness of breath, seizures, thoughts of hurting yourself or your baby, bleeding, a painful or swollen leg, fever, or headache (see AWNN POST-BIRTH Warning Signs campaign).  If these happen call 911.  Itching is also not normal in pregnancy and if you experience this, especially over your hands and feet, potentially worse at night, notify your doctors.     Kick Counts in Pregnancy   AMBULATORY CARE:   Kick counts  measure how much your baby is moving in your womb. A kick from your baby can be felt as a twist, turn, swish, roll, or jab. It is common to feel your baby kicking at 26 to 28 weeks of pregnancy. You may feel your baby kick as early as 20 weeks of pregnancy. You may want to start counting at 28 weeks.   Contact your doctor immediately if:   You feel a change in the number of kicks or movements of your baby.      You feel fewer than 10 kicks within 2 hours.      You have questions or concerns about your baby's movements.     Why measure kick counts:  Your baby's movement may provide information about your baby's health. He or she may move less, or not at all, if there are problems. Your baby may move less if he or she is not getting enough oxygen or nutrition from the placenta. Do not smoke while you are pregnant. Smoking decreases the amount of oxygen that gets to your baby. Talk to your healthcare provider if you need help to quit smoking. Tell your healthcare provider as soon as you feel a change in your baby's movements.  When to measure kick counts:   Measure kick counts at the same time every day.       Measure kick counts when your baby is awake and  most active. Your baby may be most active in the evening.     How to measure kick counts:  Check that your baby is awake before you measure kick counts. You can wake up your baby by lightly pushing on your belly, walking, or drinking something cold. Your healthcare provider may tell you different ways to measure kick counts. You may be told to do the following:  Use a chart or clock to keep track of the time you start and finish counting.      Sit in a chair or lie on your left side.      Place your hands on the largest part of your belly.      Count until you reach 10 kicks. Write down how much time it takes to count 10 kicks.      It may take 30 minutes to 2 hours to count 10 kicks. It should not take more than 2 hours to count 10 kicks.     Follow up with your doctor as directed:  Write down your questions so you remember to ask them during your visits.   © Copyright [employer] 2023 Information is for End User's use only and may not be sold, redistributed or otherwise used for commercial purposes.  The above information is an  only. It is not intended as medical advice for individual conditions or treatments. Talk to your doctor, nurse or pharmacist before following any medical regimen to see if it is safe and effective for you. Nonstress Test for Pregnancy   WHAT YOU NEED TO KNOW:   What do I need to know about a nonstress test?  A nonstress test measures your baby's heart rate and movements. Nonstress means that no stress will be placed on your baby during the test.  How do I prepare for a nonstress test?  Your healthcare provider will talk to you about how to prepare for this test. Your provider may tell you to eat and drink plenty of liquids before your test. If you smoke, you may be asked not to smoke within 2 hours before the test. Your provider will also tell you which medicines to take or not take on the day of your test.  What will happen during a nonstress test?  You may be asked to lie  down or recline back for the test on a bed. One or 2 belts with sensors will be placed around your abdomen. Your baby's heart rate will be recorded with a machine. If your baby does not move, your baby may be asleep. Your healthcare provider may make a noise near your abdomen to try to wake your baby. The test usually takes about 20 minutes, but can take longer if your baby needs to be awakened.        What do I need to know about the test results?  Your baby will be expected to move at least 2 times for a certain amount of time. Your baby's heart rate will be expected to go up by a certain number of beats per minute during movement. If your baby does not move as expected, the test may need to be repeated or you may need other tests.  CARE AGREEMENT:   You have the right to help plan your care. Learn about your health condition and how it may be treated. Discuss treatment options with your healthcare providers to decide what care you want to receive. You always have the right to refuse treatment. The above information is an  only. It is not intended as medical advice for individual conditions or treatments. Talk to your doctor, nurse or pharmacist before following any medical regimen to see if it is safe and effective for you.  © Copyright [employer] 2023 Information is for End User's use only and may not be sold, redistributed or otherwise used for commercial purposes.

## 2025-05-08 ENCOUNTER — ULTRASOUND (OUTPATIENT)
Dept: PERINATAL CARE | Facility: OTHER | Age: 25
End: 2025-05-08
Payer: COMMERCIAL

## 2025-05-08 VITALS
WEIGHT: 179 LBS | SYSTOLIC BLOOD PRESSURE: 114 MMHG | HEIGHT: 65 IN | HEART RATE: 105 BPM | BODY MASS INDEX: 29.82 KG/M2 | DIASTOLIC BLOOD PRESSURE: 60 MMHG

## 2025-05-08 DIAGNOSIS — F32.A DEPRESSION DURING PREGNANCY IN THIRD TRIMESTER: ICD-10-CM

## 2025-05-08 DIAGNOSIS — Z3A.32 32 WEEKS GESTATION OF PREGNANCY: ICD-10-CM

## 2025-05-08 DIAGNOSIS — O99.343 DEPRESSION DURING PREGNANCY IN THIRD TRIMESTER: ICD-10-CM

## 2025-05-08 DIAGNOSIS — Z36.4 ULTRASOUND FOR ANTENATAL SCREENING FOR FETAL GROWTH RESTRICTION: Primary | ICD-10-CM

## 2025-05-08 PROCEDURE — 99213 OFFICE O/P EST LOW 20 MIN: CPT | Performed by: OBSTETRICS & GYNECOLOGY

## 2025-05-08 PROCEDURE — 76816 OB US FOLLOW-UP PER FETUS: CPT | Performed by: OBSTETRICS & GYNECOLOGY

## 2025-05-08 NOTE — LETTER
May 8, 2025     Geni Sanchez MD  [address]  [address]  [place] [address]    Patient: Shyam Rizvi   YOB: 2000   Date of Visit: 5/8/2025       Dear Dr. Geni Sanchez MD:    Thank you for referring Shyam Rizvi to me for evaluation. Below are my notes for this consultation.    If you have questions, please do not hesitate to call me. I look forward to following your patient along with you.         Sincerely,        Ethan Ortega MD        CC: No Recipients    Ethan Ortega MD  5/8/2025 11:33 AM  Sign when Signing Visit  Please refer to the [facility] ultrasound report in Ob Procedures for additional information regarding today's visit

## 2025-05-12 ENCOUNTER — OFFICE VISIT (OUTPATIENT)
Dept: URGENT CARE | Facility: CLINIC | Age: 25
End: 2025-05-12
Payer: COMMERCIAL

## 2025-05-12 ENCOUNTER — HOSPITAL ENCOUNTER (EMERGENCY)
Facility: HOSPITAL | Age: 25
Discharge: HOME | End: 2025-05-12
Attending: EMERGENCY MEDICINE
Payer: COMMERCIAL

## 2025-05-12 VITALS
OXYGEN SATURATION: 99 % | SYSTOLIC BLOOD PRESSURE: 104 MMHG | RESPIRATION RATE: 18 BRPM | BODY MASS INDEX: 29.99 KG/M2 | TEMPERATURE: 97.8 F | DIASTOLIC BLOOD PRESSURE: 62 MMHG | HEIGHT: 65 IN | HEART RATE: 107 BPM | WEIGHT: 180 LBS

## 2025-05-12 VITALS
DIASTOLIC BLOOD PRESSURE: 75 MMHG | OXYGEN SATURATION: 98 % | SYSTOLIC BLOOD PRESSURE: 113 MMHG | HEART RATE: 105 BPM | RESPIRATION RATE: 20 BRPM | TEMPERATURE: 98.8 F

## 2025-05-12 DIAGNOSIS — R05.1 ACUTE COUGH: ICD-10-CM

## 2025-05-12 DIAGNOSIS — R07.89 RIGHT-SIDED CHEST WALL PAIN: Primary | ICD-10-CM

## 2025-05-12 DIAGNOSIS — R07.81 RIB PAIN: Primary | ICD-10-CM

## 2025-05-12 LAB
ACANTHOCYTES BLD QL SMEAR: PRESENT
ALBUMIN SERPL BCG-MCNC: 3.5 G/DL (ref 3.5–5)
ALP SERPL-CCNC: 129 U/L (ref 34–104)
ALT SERPL W P-5'-P-CCNC: 12 U/L (ref 7–52)
ANION GAP SERPL CALCULATED.3IONS-SCNC: 7 MMOL/L (ref 4–13)
ANISOCYTOSIS BLD QL SMEAR: PRESENT
AST SERPL W P-5'-P-CCNC: 17 U/L (ref 13–39)
BASOPHILS # BLD MANUAL: 0 THOUSAND/UL (ref 0–0.1)
BASOPHILS NFR MAR MANUAL: 0 % (ref 0–1)
BILIRUB SERPL-MCNC: 0.22 MG/DL (ref 0.2–1)
BUN SERPL-MCNC: 7 MG/DL (ref 5–25)
CALCIUM SERPL-MCNC: 8.6 MG/DL (ref 8.4–10.2)
CARDIAC TROPONIN I PNL SERPL HS: <2 NG/L (ref ?–50)
CHLORIDE SERPL-SCNC: 103 MMOL/L (ref 96–108)
CO2 SERPL-SCNC: 24 MMOL/L (ref 21–32)
CREAT SERPL-MCNC: 0.49 MG/DL (ref 0.6–1.3)
DACRYOCYTES BLD QL SMEAR: PRESENT
EOSINOPHIL # BLD MANUAL: 0 THOUSAND/UL (ref 0–0.4)
EOSINOPHIL NFR BLD MANUAL: 0 % (ref 0–6)
ERYTHROCYTE [DISTWIDTH] IN BLOOD BY AUTOMATED COUNT: 14 % (ref 11.6–15.1)
FLUAV AG UPPER RESP QL IA.RAPID: NEGATIVE
FLUBV AG UPPER RESP QL IA.RAPID: NEGATIVE
GFR SERPL CREATININE-BSD FRML MDRD: 135 ML/MIN/1.73SQ M
GIANT PLATELETS BLD QL SMEAR: PRESENT
GLUCOSE SERPL-MCNC: 93 MG/DL (ref 65–140)
HCT VFR BLD AUTO: 30.9 % (ref 34.8–46.1)
HGB BLD-MCNC: 9.7 G/DL (ref 11.5–15.4)
LYMPHOCYTES # BLD AUTO: 19 % (ref 14–44)
LYMPHOCYTES # BLD AUTO: 2.51 THOUSAND/UL (ref 0.6–4.47)
MCH RBC QN AUTO: 26.3 PG (ref 26.8–34.3)
MCHC RBC AUTO-ENTMCNC: 31.4 G/DL (ref 31.4–37.4)
MCV RBC AUTO: 84 FL (ref 82–98)
METAMYELOCYTE ABSOLUTE CT: 0.13 THOUSAND/UL (ref 0–0.1)
METAMYELOCYTES NFR BLD MANUAL: 1 % (ref 0–1)
MONOCYTES # BLD AUTO: 0.53 THOUSAND/UL (ref 0–1.22)
MONOCYTES NFR BLD: 4 % (ref 4–12)
MYELOCYTE ABSOLUTE CT: 0.26 THOUSAND/UL (ref 0–0.1)
MYELOCYTES NFR BLD MANUAL: 2 % (ref 0–1)
NEUTROPHILS # BLD MANUAL: 9.77 THOUSAND/UL (ref 1.85–7.62)
NEUTS SEG NFR BLD AUTO: 74 % (ref 43–75)
OVALOCYTES BLD QL SMEAR: PRESENT
PLATELET # BLD AUTO: 257 THOUSANDS/UL (ref 149–390)
PLATELET BLD QL SMEAR: ADEQUATE
PLATELET CLUMP BLD QL SMEAR: PRESENT
PMV BLD AUTO: 10.3 FL (ref 8.9–12.7)
POIKILOCYTOSIS BLD QL SMEAR: PRESENT
POLYCHROMASIA BLD QL SMEAR: PRESENT
POTASSIUM SERPL-SCNC: 3.7 MMOL/L (ref 3.5–5.3)
PROT SERPL-MCNC: 6.9 G/DL (ref 6.4–8.4)
RBC # BLD AUTO: 3.69 MILLION/UL (ref 3.81–5.12)
RBC MORPH BLD: PRESENT
SARS-COV+SARS-COV-2 AG RESP QL IA.RAPID: NEGATIVE
SODIUM SERPL-SCNC: 134 MMOL/L (ref 135–147)
WBC # BLD AUTO: 13.2 THOUSAND/UL (ref 4.31–10.16)

## 2025-05-12 PROCEDURE — 85027 COMPLETE CBC AUTOMATED: CPT

## 2025-05-12 PROCEDURE — 87804 INFLUENZA ASSAY W/OPTIC: CPT

## 2025-05-12 PROCEDURE — 85007 BL SMEAR W/DIFF WBC COUNT: CPT

## 2025-05-12 PROCEDURE — G0382 LEV 3 HOSP TYPE B ED VISIT: HCPCS | Performed by: PHYSICIAN ASSISTANT

## 2025-05-12 PROCEDURE — 36415 COLL VENOUS BLD VENIPUNCTURE: CPT

## 2025-05-12 PROCEDURE — 87811 SARS-COV-2 COVID19 W/OPTIC: CPT

## 2025-05-12 PROCEDURE — 93005 ELECTROCARDIOGRAM TRACING: CPT | Performed by: PHYSICIAN ASSISTANT

## 2025-05-12 PROCEDURE — 93005 ELECTROCARDIOGRAM TRACING: CPT

## 2025-05-12 PROCEDURE — 99285 EMERGENCY DEPT VISIT HI MDM: CPT | Performed by: EMERGENCY MEDICINE

## 2025-05-12 PROCEDURE — 84484 ASSAY OF TROPONIN QUANT: CPT

## 2025-05-12 PROCEDURE — 80053 COMPREHEN METABOLIC PANEL: CPT

## 2025-05-12 PROCEDURE — 99285 EMERGENCY DEPT VISIT HI MDM: CPT

## 2025-05-12 NOTE — PROGRESS NOTES
Patient Name: Shyam Rizvi      : 2000      MRN: [id number]  Encounter Provider: Virgen Byers PA-C  Encounter Date: May 12, 2025  Encounter department: [facility]    Assessment & Plan  Rib pain  Reviewed patient's chart, she does have a history of elevated D-dimers and had a CTA done in 2024.  No blood clot was found at that time.  She was recently seen at the ER May 2, 2025 for evaluation of shortness of breath.  Her D-dimer was also slightly elevated at that time.  EKG done in clinic.  No obvious acute abnormalities other than sinus tachycardia noted.    Discussed with patient that based off of her history and symptoms today I would recommend that she be seen in the ER for further evaluation and diagnostic workup of her symptoms as I am not able to rule out a blood clot at the urgent care setting or do any OB related testing.  It could be possible that she has costochondritis related to the coughing, however as I am not able to rule out any other serious cause I did discuss with the patient that I would recommend she be seen at the ER for further evaluation.  If she is hesitant to go straight to the ER she could always contact her OB/GYN and speak to the on-call provider to get their opinion on what steps she should take next.     Orders:    ECG 12 lead; Future    Transfer to other facility    Acute cough               Patient Instructions:   Patient Instructions   Follow up with PCP in 3-5 days.  Proceed to  ER if symptoms worsen.    If tests are performed, our office will contact you with results only if changes need to made to the care plan discussed with you at the visit. You can review your full results on [facility].     Subjective   Chief Complaint   Patient presents with    Rib Pain     Pt states she has sharp, shooting right sided rib pain since Friday morning when coughs, sneezes and takes deep breath. Pt states she also has seasonal allergies  and sore throat.          Shyam Rizvi is a 24 y.o.female  Patient presents for evaluation of sharp right sided rib pain that she has had since Friday morning that gets worse when she coughs.  She states she has been coughing consistently and taking deep breaths and sneezing more frequently.  She thinks that her seasonal allergies have been affecting her because she also has a sore throat.  She is currently pregnant and in her third trimester.  She states that she has been having symptoms of shortness of breath throughout most of her pregnancy.  She states she is has been having slightly elevated D-dimers, but was told that that could be normal for pregnancy.  Her most recent visit to the ER was on May 2.  She is nervous that she is slightly overreacting with the symptoms, but she did not feel this way during her first pregnancy.         Review of Systems   Constitutional:  Negative for activity change, appetite change, fatigue and fever.   HENT:  Positive for congestion and sore throat.    Respiratory:  Positive for cough, chest tightness and shortness of breath. Negative for wheezing.    Allergic/Immunologic: Positive for environmental allergies.   All other systems reviewed and are negative.        Current Outpatient Medications:     acetaminophen (TYLENOL) 650 mg CR tablet, Take 1 tablet (650 mg total) by mouth every 8 (eight) hours as needed for mild pain, Disp: 30 tablet, Rfl: 0    aspirin (ECOTRIN LOW STRENGTH) 81 mg EC tablet, Take 2 tablets (162 mg total) by mouth daily Stop at 36 weeks.  Contact your OB or MFM with any side effects.  See https://www.preeclampsia.org/aspirin, Disp: 60 tablet, Rfl: 3    Prenatal Vit-Fe Fumarate-FA (PRENATAL PO), Take by mouth, Disp: , Rfl:     sertraline (Zoloft) 50 mg tablet, Take 1.5 tablets (75 mg total) by mouth daily, Disp: 30 tablet, Rfl: 1    ondansetron (ZOFRAN-ODT) 4 mg disintegrating tablet, Take 1 tablet (4 mg total) by mouth every 8 (eight) hours as needed for  "nausea or vomiting, Disp: 30 tablet, Rfl: 1  Allergies as of 05/12/2025    (No Known Allergies)     Past Medical History:   Diagnosis Date    Anemia     Chlamydia 2019    COVID-19 11/26/2022 2024    Depression     Gonorrhea     2019   tx'd    Migraine     Sickle cell trait (HCC)     Varicella     childhood chickenpox     Past Surgical History:   Procedure Laterality Date    NO PAST SURGERIES       Family History   Problem Relation Age of Onset    Mental illness Mother     Depression Mother     Gout Mother     Sickle cell trait Mother     Sickle cell trait Father     Pulmonary embolism Father     Hypertension Half-Brother     Panic disorder Half-Brother     Depression Half-Brother     Stroke Maternal Grandmother     Diabetes Maternal Grandmother     Alcohol abuse Maternal Grandfather     Cancer Paternal Grandmother     Cancer Paternal Grandfather     Breast cancer Cousin     Schizophrenia Half-Brother     Sickle cell trait Half-Brother     Colon cancer Neg Hx     Ovarian cancer Neg Hx     Uterine cancer Neg Hx     Cervical cancer Neg Hx         Objective   /62   Pulse (!) 107   Temp 97.8 °F (36.6 °C)   Resp 18   Ht 5' 5\" (1.651 m)   Wt 81.6 kg (180 lb)   LMP  (LMP Unknown)   SpO2 99%   BMI 29.95 kg/m²      Physical Exam  Vitals and nursing note reviewed.   Constitutional:       General: She is not in acute distress.     Appearance: Normal appearance. She is not diaphoretic.   HENT:      Right Ear: Tympanic membrane, ear canal and external ear normal.      Left Ear: Tympanic membrane, ear canal and external ear normal.      Nose: Nose normal.      Mouth/Throat:      Mouth: Mucous membranes are moist.   Cardiovascular:      Rate and Rhythm: Regular rhythm. Tachycardia present.   Pulmonary:      Effort: Pulmonary effort is normal.      Breath sounds: Normal breath sounds. No wheezing or rhonchi.   Chest:      Chest wall: Tenderness (Reproducible chest tenderness along the lateral aspect of the right " ribs.  Primarily along the ribs 4,5, and 6.) present.   Skin:     General: Skin is warm and dry.   Neurological:      General: No focal deficit present.      Mental Status: She is alert and oriented to person, place, and time.   Psychiatric:         Mood and Affect: Mood normal.         Behavior: Behavior normal.

## 2025-05-12 NOTE — PATIENT INSTRUCTIONS
Follow up with PCP in 3-5 days.  Proceed to  ER if symptoms worsen.    If tests are performed, our office will contact you with results only if changes need to made to the care plan discussed with you at the visit. You can review your full results on [facility] Mychart.

## 2025-05-13 LAB
ATRIAL RATE: 101 BPM
P AXIS: 18 DEGREES
PR INTERVAL: 158 MS
QRS AXIS: 35 DEGREES
QRSD INTERVAL: 80 MS
QT INTERVAL: 338 MS
QTC INTERVAL: 439 MS
T WAVE AXIS: 24 DEGREES
VENTRICULAR RATE: 101 BPM

## 2025-05-13 PROCEDURE — 93010 ELECTROCARDIOGRAM REPORT: CPT | Performed by: INTERNAL MEDICINE

## 2025-05-13 NOTE — DISCHARGE INSTRUCTIONS
Patient Education     Chest Pain, Adult ED   General Information   You came to the Emergency Department (ED) for chest pain. The doctor feels that the risk of a serious cause for your chest pain is low. Many things can cause chest pain. Some are serious things like heart disease or lung disease. Less serious things like stomach problems can also cause chest pain.  The doctors may not be able to find all serious causes of chest pain the first time they see you. It is important that you follow up with your doctor. You may be waiting on some test results. The staff will notify you if there are concerning results.  What care is needed at home?   Call your regular doctor to let them know you were in the ED. Make a follow-up appointment if you were told to.  Follow your regular doctor’s orders to keep your blood pressure, cholesterol, and high blood sugar (diabetes) under control.  If you smoke, try to quit. Your doctor or nurse can help.  Keep a healthy weight. If you are too heavy, lose weight.  Eat a healthy diet, as discussed with your doctor or nurse.  When do I need to get emergency help?   Call for an ambulance if:   You have signs of a heart attack, which may include:  Severe chest pain, pressure, or discomfort with:  Breathing trouble; sweating; upset stomach; or cold, clammy skin.  Pain in your arms, back, or jaw.  Worse pain with activity like walking up stairs.  Fast or irregular heartbeat.  Feeling dizzy, faint, or weak.  When do I need to call the doctor?   You have a fever of 100.4°F (38°C) or higher or chills.  You cough up yellow or green mucus.  You are more tired than normal or more trouble breathing with activity.  You have new or worsening symptoms.  Last Reviewed Date   2020-06-28  Consumer Information Use and Disclaimer   This generalized information is a limited summary of diagnosis, treatment, and/or medication information. It is not meant to be comprehensive and should be used as a tool to help  the user understand and/or assess potential diagnostic and treatment options. It does NOT include all information about conditions, treatments, medications, side effects, or risks that may apply to a specific patient. It is not intended to be medical advice or a substitute for the medical advice, diagnosis, or treatment of a health care provider based on the health care provider's examination and assessment of a patient’s specific and unique circumstances. Patients must speak with a health care provider for complete information about their health, medical questions, and treatment options, including any risks or benefits regarding use of medications. This information does not endorse any treatments or medications as safe, effective, or approved for treating a specific patient. [employer] and its affiliates disclaim any warranty or liability relating to this information or the use thereof. The use of this information is governed by the Terms of Use, available at https://www.[employer].com/en/know/clinical-effectiveness-terms   Copyright   Copyright © 2024 [employer], [employer] and its affiliates and/or licensors. All rights reserved.

## 2025-05-13 NOTE — ED PROVIDER NOTES
Time reflects when diagnosis was documented in both MDM as applicable and the Disposition within this note       Time User Action Codes Description Comment    5/12/2025 10:39 PM Ronak Cali Add [R07.89] Right-sided chest wall pain           ED Disposition       ED Disposition   Discharge    Condition   Stable    Date/Time   Mon May 12, 2025 10:39 PM    Comment   Shyam Rizvi discharge to home/self care.                   Assessment & Plan       Medical Decision Making  24-year-old female, approximately 33 weeks pregnant, presents with pain to right lateral chest wall.  Differential diagnosis includes pneumonia, muscle strain, pulmonary embolism among other diagnoses.  On exam, patient looks well and in no distress.  Normal respiratory effort, oxygen saturation normal on room air, lungs clear.  Patient has no leg swelling or tenderness on exam, no symptoms or findings concerning for DVT.  Patient has point tenderness to right lateral chest wall.  EKG and lab work obtained from triage, reviewed and compared to previous, no acute abnormality.  Patient was recently evaluated in ED for shortness of breath, had labs including D-dimer, PE ruled out by YEARS criteria, patient low risk for PE.  Patient symptoms likely due to cough, is afebrile with clear lungs.  Discussed with patient applying heat or ice to area as well as taking acetaminophen as needed.  Patient started to follow-up with her doctors, instructed to return to emergency department for any worsening or new concerning symptoms.    I have reviewed test results and diagnosis with patient.  Follow-up plan reviewed.  Precautions for acute return for re-evaluation are reviewed.  Opportunity to ask questions was provided.  Patient verbalizes understanding.      Amount and/or Complexity of Data Reviewed  External Data Reviewed: labs, ECG and notes.     Details: Previous ED visit, labs, and EKG reviewed.  Labs: ordered. Decision-making details documented in ED  Course.  ECG/medicine tests: ordered and independent interpretation performed. Decision-making details documented in ED Course.             Medications - No data to display    ED Risk Strat Scores      HEART Risk Score      Flowsheet Row Most Recent Value   Heart Score Risk Calculator    History 0 Filed at: 05/12/2025 2246   ECG 0 Filed at: 05/12/2025 2246   Age 0 Filed at: 05/12/2025 2246   Risk Factors 0 Filed at: 05/12/2025 2246   Troponin 0 Filed at: 05/12/2025 2246   HEART Score 0 Filed at: 05/12/2025 2246                      No data recorded            Wells' Criteria for PE      Flowsheet Row Most Recent Value   Wells' Criteria for PE    Clinical signs and symptoms of DVT 0 Filed at: 05/12/2025 2257   PE is primary diagnosis or equally likely 0 Filed at: 05/12/2025 2257   HR >100 1.5 Filed at: 05/12/2025 2257   Immobilization at least 3 days or Surgery in the previous 4 weeks 0 Filed at: 05/12/2025 2257   Previous, objectively diagnosed PE or DVT 0 Filed at: 05/12/2025 2257   Hemoptysis 0 Filed at: 05/12/2025 2257   Malignancy with treatment within 6 months or palliative 0 Filed at: 05/12/2025 2257   Wells' Criteria Total 1.5 Filed at: 05/12/2025 2257                        History of Present Illness       Chief Complaint   Patient presents with    Rib Pain     Pt reports right sided upper rib pain started yesterday, reports pain worse with cough, pt also reports sore throat.   Reports being seen at urgent care and sent in for further eval.  Pt is almost 33 weeks pregnant. Pt also reports continued SOB last 3 weeks, reports being seen in past for this SOB.       Past Medical History:   Diagnosis Date    Anemia     Chlamydia 2019    COVID-19 11/26/2022 2024    Depression     Gonorrhea     2019   tx'd    Migraine     Sickle cell trait (HCC)     Varicella     childhood chickenpox      Past Surgical History:   Procedure Laterality Date    NO PAST SURGERIES        Family History   Problem Relation Age of Onset     Mental illness Mother     Depression Mother     Gout Mother     Sickle cell trait Mother     Sickle cell trait Father     Pulmonary embolism Father     Hypertension Half-Brother     Panic disorder Half-Brother     Depression Half-Brother     Stroke Maternal Grandmother     Diabetes Maternal Grandmother     Alcohol abuse Maternal Grandfather     Cancer Paternal Grandmother     Cancer Paternal Grandfather     Breast cancer Cousin     Schizophrenia Half-Brother     Sickle cell trait Half-Brother     Colon cancer Neg Hx     Ovarian cancer Neg Hx     Uterine cancer Neg Hx     Cervical cancer Neg Hx       Social History     Tobacco Use    Smoking status: Never    Smokeless tobacco: Never   Vaping Use    Vaping status: Never Used   Substance Use Topics    Alcohol use: Not Currently     Comment: nothing after 1st month of pregnancy    Drug use: Not Currently     Types: Marijuana     Comment: marijuana-last used 2023-FOB-marijuana; denies parent use- fob's uncle-heroin      E-Cigarette/Vaping    E-Cigarette Use Never User       E-Cigarette/Vaping Substances    Nicotine No     THC No     CBD No     Flavoring No     Other No     Unknown No       I have reviewed and agree with the history as documented.     24-year-old female, approximately 33 weeks pregnant, presenting with pain in right lateral chest.  Patient reports several days of pain in right lateral chest, worse with coughing or movement.  States that she has had allergy symptoms such as congestion and nonproductive cough for several days.  Denies any fevers.  Patient has had mild shortness of breath for last several weeks of pregnancy, was seen in ED recently and evaluated for shortness of breath.  Patient denies any vaginal bleeding or discharge.  Has mild bilateral lower extremity swelling, no pain in the legs.      History provided by:  Patient   used: No        Review of Systems   Constitutional: Negative.  Negative for fever.   Respiratory:   Positive for cough.    Gastrointestinal: Negative.    Genitourinary: Negative.            Objective       ED Triage Vitals [05/12/25 2124]   Temperature Pulse Blood Pressure Respirations SpO2 Patient Position - Orthostatic VS   98.8 °F (37.1 °C) 105 113/75 20 98 % Sitting      Temp Source Heart Rate Source BP Location FiO2 (%) Pain Score    Oral Monitor Right arm -- --      Vitals      Date and Time Temp Pulse SpO2 Resp BP Pain Score FACES Pain Rating User   05/12/25 2124 98.8 °F (37.1 °C) 105 98 % 20 113/75 -- -- MW            Physical Exam  Vitals and nursing note reviewed.   Constitutional:       General: She is not in acute distress.  HENT:      Head: Normocephalic and atraumatic.   Cardiovascular:      Rate and Rhythm: Normal rate and regular rhythm.   Pulmonary:      Effort: Pulmonary effort is normal.      Breath sounds: Normal breath sounds.   Chest:          Comments: Tenderness to right lateral chest wall, no swelling, deformity, or crepitus.  Abdominal:      Comments: Gravid uterus, abdomen nontender   Musculoskeletal:         General: No swelling or tenderness. Normal range of motion.      Comments: No calf tenderness   Skin:     General: Skin is warm and dry.   Neurological:      General: No focal deficit present.      Mental Status: She is alert and oriented to person, place, and time.      Motor: No weakness.      Gait: Gait normal.         Results Reviewed       Procedure Component Value Units Date/Time    CBC and differential [[id number]]  (Abnormal) Collected: 05/12/25 2132    Lab Status: Final result Specimen: Blood from Arm, Left Updated: 05/12/25 2235     WBC 13.20 Thousand/uL      RBC 3.69 Million/uL      Hemoglobin 9.7 g/dL      Hematocrit 30.9 %      MCV 84 fL      MCH 26.3 pg      MCHC 31.4 g/dL      RDW 14.0 %      MPV 10.3 fL      Platelets 257 Thousands/uL     Narrative:      This is an appended report.  These results have been appended to a previously verified report.    Manual  Differential(PHLEBS Do Not Order) [[id number]]  (Abnormal) Collected: 05/12/25 2132    Lab Status: Final result Specimen: Blood from Arm, Left Updated: 05/12/25 2235     Segmented % 74 %      Lymphocytes % 19 %      Monocytes % 4 %      Eosinophils % 0 %      Basophils % 0 %      Metamyelocytes % 1 %      Myelocytes % 2 %      Absolute Neutrophils 9.77 Thousand/uL      Absolute Lymphocytes 2.51 Thousand/uL      Absolute Monocytes 0.53 Thousand/uL      Absolute Eosinophils 0.00 Thousand/uL      Absolute Basophils 0.00 Thousand/uL      Absolute Metamyelocytes 0.13 Thousand/uL      Absolute Myelocytes 0.26 Thousand/uL      Total Counted --     RBC Morphology Present     Platelet Estimate Adequate     Clumped Platelets Present     Giant PLTs Present     Acanthocytes Present     Anisocytosis Present     Ovalocytes Present     Poikilocytes Present     Polychromasia Present     Tear Drop Cells Present    RBC Morphology Reflex Test [[id number]] Collected: 05/12/25 2132    Lab Status: In process Specimen: Blood from Arm, Left Updated: 05/12/25 2235    HS Troponin 0hr (reflex protocol) [[id number]]  (Normal) Collected: 05/12/25 2132    Lab Status: Final result Specimen: Blood from Arm, Left Updated: 05/12/25 2209     hs TnI 0hr <2 ng/L     Comprehensive metabolic panel [[id number]]  (Abnormal) Collected: 05/12/25 2132    Lab Status: Final result Specimen: Blood from Arm, Left Updated: 05/12/25 2204     Sodium 134 mmol/L      Potassium 3.7 mmol/L      Chloride 103 mmol/L      CO2 24 mmol/L      ANION GAP 7 mmol/L      BUN 7 mg/dL      Creatinine 0.49 mg/dL      Glucose 93 mg/dL      Calcium 8.6 mg/dL      AST 17 U/L      ALT 12 U/L      Alkaline Phosphatase 129 U/L      Total Protein 6.9 g/dL      Albumin 3.5 g/dL      Total Bilirubin 0.22 mg/dL      eGFR 135 ml/min/1.73sq m     Narrative:      National Kidney Disease Foundation guidelines for Chronic Kidney Disease (CKD):     Stage 1 with normal or high GFR (GFR > 90 mL/min/1.73  square meters)    Stage 2 Mild CKD (GFR = 60-89 mL/min/1.73 square meters)    Stage 3A Moderate CKD (GFR = 45-59 mL/min/1.73 square meters)    Stage 3B Moderate CKD (GFR = 30-44 mL/min/1.73 square meters)    Stage 4 Severe CKD (GFR = 15-29 mL/min/1.73 square meters)    Stage 5 End Stage CKD (GFR <15 mL/min/1.73 square meters)  Note: GFR calculation is accurate only with a steady state creatinine    FLU/COVID Rapid Antigen (30 min. TAT) - Preferred screening test in ED [[id number]]  (Normal) Collected: 05/12/25 2135    Lab Status: Final result Specimen: Nares from Nose Updated: 05/12/25 2201     SARS COV Rapid Antigen Negative     Influenza A Rapid Antigen Negative     Influenza B Rapid Antigen Negative    Narrative:      This test has been performed using the Quidel Rachel 2 FLU+SARS Antigen test under the Emergency Use Authorization (EUA). This test has been validated by the  and verified by the performing laboratory. The Rachel uses lateral flow immunofluorescent sandwich assay to detect SARS-COV, Influenza A and Influenza B Antigen.     The Quidel Rachel 2 SARS Antigen test does not differentiate between SARS-CoV and SARS-CoV-2.     Negative results are presumptive and may be confirmed with a molecular assay, if necessary, for patient management. Negative results do not rule out SARS-CoV-2 or influenza infection and should not be used as the sole basis for treatment or patient management decisions. A negative test result may occur if the level of antigen in a sample is below the limit of detection of this test.     Positive results are indicative of the presence of viral antigens, but do not rule out bacterial infection or co-infection with other viruses.     All test results should be used as an adjunct to clinical observations and other information available to the provider.    FOR PEDIATRIC PATIENTS - copy/paste COVID Guidelines URL to browser:  [email or web address]            No orders to display       ECG 12 Lead Documentation Only    Date/Time: 5/12/2025 10:48 PM    Performed by: Ronak Cali MD  Authorized by: Ronak Cali MD    ECG reviewed by me, the ED Provider: yes    Patient location:  ED  Previous ECG:     Previous ECG:  Compared to current    Similarity:  No change  Rate:     ECG rate assessment: normal    Rhythm:     Rhythm: sinus rhythm    Ectopy:     Ectopy: none    QRS:     QRS axis:  Normal    QRS intervals:  Normal  Conduction:     Conduction: normal    Comments:      Sinus rhythm at 95, no acute changes      ED Medication and Procedure Management   Prior to Admission Medications   Prescriptions Last Dose Informant Patient Reported? Taking?   Prenatal Vit-Fe Fumarate-FA (PRENATAL PO)  Self Yes No   Sig: Take by mouth   acetaminophen (TYLENOL) 650 mg CR tablet  Self No No   Sig: Take 1 tablet (650 mg total) by mouth every 8 (eight) hours as needed for mild pain   aspirin (ECOTRIN LOW STRENGTH) 81 mg EC tablet  Self No No   Sig: Take 2 tablets (162 mg total) by mouth daily Stop at 36 weeks.  Contact your OB or MFM with any side effects.  See [email or web address]://www.preeclampsia.org/[email or web address]   ondansetron (ZOFRAN-ODT) 4 mg disintegrating tablet   No No   Sig: Take 1 tablet (4 mg total) by mouth every 8 (eight) hours as needed for nausea or vomiting   sertraline (Zoloft) 50 mg tablet   No No   Sig: Take 1.5 tablets (75 mg total) by mouth daily      Facility-Administered Medications: None     Patient's Medications   Discharge Prescriptions    No medications on file     No discharge procedures on file.  ED SEPSIS DOCUMENTATION   Time reflects when diagnosis was documented in both MDM as applicable and the Disposition within this note       Time User Action Codes Description Comment    5/12/2025 10:39 PM Ronak Cali Add [R07.89] Right-sided chest wall pain                  Ronak Cali  MD  05/12/25 7653       Ronak Cali MD  05/12/25 7732

## 2025-05-14 LAB
ATRIAL RATE: 95 BPM
P AXIS: 19 DEGREES
PR INTERVAL: 156 MS
QRS AXIS: 48 DEGREES
QRSD INTERVAL: 76 MS
QT INTERVAL: 350 MS
QTC INTERVAL: 439 MS
T WAVE AXIS: 23 DEGREES
VENTRICULAR RATE: 95 BPM

## 2025-05-14 PROCEDURE — 93010 ELECTROCARDIOGRAM REPORT: CPT | Performed by: INTERNAL MEDICINE

## 2025-05-15 ENCOUNTER — TELEPHONE (OUTPATIENT)
Dept: OBGYN CLINIC | Facility: CLINIC | Age: 25
End: 2025-05-15

## 2025-05-15 ENCOUNTER — ROUTINE PRENATAL (OUTPATIENT)
Dept: OBGYN CLINIC | Facility: CLINIC | Age: 25
End: 2025-05-15

## 2025-05-15 VITALS — WEIGHT: 179.8 LBS | SYSTOLIC BLOOD PRESSURE: 116 MMHG | DIASTOLIC BLOOD PRESSURE: 70 MMHG | BODY MASS INDEX: 29.92 KG/M2

## 2025-05-15 DIAGNOSIS — D50.9 IRON DEFICIENCY ANEMIA DURING PREGNANCY: ICD-10-CM

## 2025-05-15 DIAGNOSIS — Z3A.33 33 WEEKS GESTATION OF PREGNANCY: ICD-10-CM

## 2025-05-15 DIAGNOSIS — F32.2 SEVERE MAJOR DEPRESSIVE DISORDER (HCC): ICD-10-CM

## 2025-05-15 DIAGNOSIS — Z34.83 PRENATAL CARE, SUBSEQUENT PREGNANCY IN THIRD TRIMESTER: ICD-10-CM

## 2025-05-15 DIAGNOSIS — O09.899 HISTORY OF PRETERM DELIVERY, CURRENTLY PREGNANT: Primary | ICD-10-CM

## 2025-05-15 DIAGNOSIS — O99.019 IRON DEFICIENCY ANEMIA DURING PREGNANCY: ICD-10-CM

## 2025-05-15 DIAGNOSIS — Z86.19 HISTORY OF HERPES GENITALIS: ICD-10-CM

## 2025-05-15 LAB
SL AMB  POCT GLUCOSE, UA: ABNORMAL
SL AMB POCT URINE PROTEIN: ABNORMAL

## 2025-05-15 NOTE — ASSESSMENT & PLAN NOTE
Blood Type:. O+  28 Week Labs- iron deficiency anemia     Blue folder- has  Yellow folder- has     NIPS- low risk  AFP- low risk    Level 2- 2/13/25 WNL     TDAP - 4/16/25     Delivery consent- 4/16/25  Breast pump -  has  L&D forms- patient has     Perineal massage -discussed today  GBS swab -   IOL -

## 2025-05-15 NOTE — PROGRESS NOTES
Prenatal Visit  Name: Shyam Rzivi      : 2000      MRN: [id number]  Encounter Provider: Natalya Egan PA-C  Encounter Date: 5/15/2025   Encounter department: [facility]    :  Assessment & Plan  33 weeks gestation of pregnancy     Blood Type:. O+  28 Week Labs- iron deficiency anemia     Blue folder- has  Yellow folder- has     NIPS- low risk  AFP- low risk    Level 2- 25 WNL     TDAP - 25     Delivery consent- 25  Breast pump -  has  L&D forms- patient has     Perineal massage -discussed today  GBS swab -   IOL -    Prenatal care, subsequent pregnancy in third trimester     , 33w1d    Continue aspirin until 36 weeks    We again reviewed the S/S PTL and importance of consistent/regular FM. Reviewed process for [facility] and encouraged pt to call with any decreases in fetal movement    RTO in 2 weeks or sooner if needed    History of herpes genitalis     First and only outbreak was approximately 1 year ago  Discussed antiviral treatment starting at 36 weeks    Severe major depressive disorder (HCC)     EPDS 16  Denies SI/HI and would feel comfortable letting us know if she has those feeling  Patient was increased from 50 mg to 75 mg on 25, reminded patient it can take 6 weeks to reach therapeutic levels, can consider dose increase if symptoms are not improving at that time  Patient is interested in talk therapy, referral placed    Iron deficiency anemia during pregnancy  Patient did not take an iron supplementation due to concern of GI upset, she has a long standing history of constipation  CBC done while in the ED for her shortness of breath 2025 hemoglobin 9.7 hematocrit 30.9  Ferritin level 2025 6  Patient would prefer doing iron infusions over oral supplementation   iron infusion request placed             History of Present Illness   History of Present Illness     Shyam is a 24 y.o.  33w1d here for Routine Prenatal  Visit (33w1d)    Denies unusual vaginal discharge, LOF, VB, or ctx. Reports Fetus is active.     We discussed her elevated EPDS score. Patient had a zoloft dose increase on 4/23/25. Patient did talk therapy in the past online but stopped due to cost. Patient stopped around the beginning of 2025. Denies SI/HI and would feel comfortable letting us know if she did feel     Objective   /70 (BP Location: Left arm, Patient Position: Sitting, Cuff Size: Standard)   Wt 81.6 kg (179 lb 12.8 oz)   LMP  (LMP Unknown)   BMI 29.92 kg/m²     Pregravid Weight/BMI: 71.2 kg (157 lb) (BMI 26.13)  Total Weight Gain: 10.3 kg (22 lb 12.8 oz)     Physical Exam  Constitutional:       Appearance: Normal appearance.   HENT:      Head: Normocephalic and atraumatic.     Eyes:      Extraocular Movements: Extraocular movements intact.     Pulmonary:      Effort: Pulmonary effort is normal.     Musculoskeletal:         General: Normal range of motion.     Neurological:      Mental Status: She is alert.     Skin:     General: Skin is warm and dry.     Psychiatric:         Mood and Affect: Mood normal.         Behavior: Behavior normal.       Fetal Heart Rate: 150  Fundal Height (cm): 33 cm    Natalya Egan PA-C

## 2025-05-15 NOTE — ASSESSMENT & PLAN NOTE
Patient did not take an iron supplementation due to concern of GI upset, she has a long standing history of constipation  CBC done while in the ED for her shortness of breath 5/12/2025 hemoglobin 9.7 hematocrit 30.9  Ferritin level 4/2/2025 6  Patient would prefer doing iron infusions over oral supplementation   iron infusion request placed

## 2025-05-15 NOTE — ASSESSMENT & PLAN NOTE
First and only outbreak was approximately 1 year ago  Discussed antiviral treatment starting at 36 weeks

## 2025-05-15 NOTE — ASSESSMENT & PLAN NOTE
EPDS 16  Denies SI/HI and would feel comfortable letting us know if she has those feeling  Patient was increased from 50 mg to 75 mg on 4/23/25, reminded patient it can take 6 weeks to reach therapeutic levels, can consider dose increase if symptoms are not improving at that time  Patient is interested in talk therapy, referral placed

## 2025-05-15 NOTE — TELEPHONE ENCOUNTER
----- Message from Zhane YOUNG sent at 12/9/2024  7:22 AM EST -----  Regarding: 3rd Trimester Call Due  4/23-5/21

## 2025-05-16 ENCOUNTER — TELEPHONE (OUTPATIENT)
Age: 25
End: 2025-05-16

## 2025-05-16 ENCOUNTER — TELEPHONE (OUTPATIENT)
Dept: OBGYN CLINIC | Facility: CLINIC | Age: 25
End: 2025-05-16

## 2025-05-16 DIAGNOSIS — D50.9 IRON DEFICIENCY ANEMIA DURING PREGNANCY: Primary | ICD-10-CM

## 2025-05-16 DIAGNOSIS — O99.019 IRON DEFICIENCY ANEMIA DURING PREGNANCY: Primary | ICD-10-CM

## 2025-05-16 RX ORDER — SODIUM CHLORIDE 9 MG/ML
20 INJECTION, SOLUTION INTRAVENOUS ONCE
OUTPATIENT
Start: 2025-05-16

## 2025-05-16 NOTE — TELEPHONE ENCOUNTER
Writer attempted to contact pt regarding referral for [facility] to verify services needed to place her on [facility] wait list. Lvm to call writer back.

## 2025-05-16 NOTE — TELEPHONE ENCOUNTER
Left message for patient availability for Iron infusions at the [facility].

## 2025-05-16 NOTE — TELEPHONE ENCOUNTER
----- Message from Natalya Egan PA-C sent at 5/15/2025  5:04 PM EDT -----  Regarding: Iron infusions  This patient has a diagnosis of iron deficiency anemia and needs IV iron treatment - please schedule for infusions Venofer 200 mg in 100 mL sodium chloride 0.9%twice weekly Total of five treatments Please include infusion appointment request, oncology nurse communication, and monitor patient afterwards.

## 2025-05-20 NOTE — TELEPHONE ENCOUNTER
Left message x 2 for patient to call back regarding her availability for iron infusions so we may get her scheduled.

## 2025-05-21 PROBLEM — Z3A.34 34 WEEKS GESTATION OF PREGNANCY: Status: ACTIVE | Noted: 2025-01-24

## 2025-05-21 PROBLEM — Z34.90 SUPERVISION OF NORMAL PREGNANCY: Status: RESOLVED | Noted: 2025-01-06 | Resolved: 2025-05-21

## 2025-05-22 NOTE — TELEPHONE ENCOUNTER
My chart message sent to patient regarding scheduling iron infusions advised to call or reply to message with availability.

## 2025-05-23 ENCOUNTER — NURSE TRIAGE (OUTPATIENT)
Age: 25
End: 2025-05-23

## 2025-05-23 ENCOUNTER — ROUTINE PRENATAL (OUTPATIENT)
Dept: OBGYN CLINIC | Facility: CLINIC | Age: 25
End: 2025-05-23

## 2025-05-23 VITALS — WEIGHT: 181 LBS | BODY MASS INDEX: 30.12 KG/M2

## 2025-05-23 DIAGNOSIS — Z11.3 SCREENING FOR STDS (SEXUALLY TRANSMITTED DISEASES): Primary | ICD-10-CM

## 2025-05-23 DIAGNOSIS — R07.81 RIB PAIN ON RIGHT SIDE: ICD-10-CM

## 2025-05-23 DIAGNOSIS — N89.8 VAGINAL DISCHARGE: ICD-10-CM

## 2025-05-23 LAB
BV WHIFF TEST VAG QL: NORMAL
CLUE CELLS SPEC QL WET PREP: NORMAL
PH SMN: 4.5 [PH]
T VAGINALIS VAG QL WET PREP: NORMAL
YEAST VAG QL WET PREP: NORMAL

## 2025-05-23 PROCEDURE — 87491 CHLMYD TRACH DNA AMP PROBE: CPT | Performed by: OBSTETRICS & GYNECOLOGY

## 2025-05-23 PROCEDURE — 87591 N.GONORRHOEAE DNA AMP PROB: CPT | Performed by: OBSTETRICS & GYNECOLOGY

## 2025-05-23 NOTE — TELEPHONE ENCOUNTER
"FOLLOW UP: Appointment today,  at 8:45am.    REASON FOR CONVERSATION: Vaginitis    SYMPTOMS: Vaginal irritation, increase in vaginal discharge that can be yellow, itching and slight odor - not fishy.    OTHER: OB 34w2d  - unsure if this feels like prior yeast infection vs BV. Appointment scheduled for evaluation.     DISPOSITION: See Today in Office (overriding Home Care)      Reason for Disposition   Symptoms of a yeast infection (i.e., itchy, white discharge, not bad smelling) and feels like prior vaginal yeast infections    Answer Assessment - Initial Assessment Questions  1. SYMPTOM: \"What's the main symptom you're concerned about?\" (e.g., pain, itching, dryness)      Increase in vaginal discharge that can be yellow, itching, irritation and slight odor  2. LOCATION: \"Where is the  symptoms located?\" (e.g., inside/outside, left/right)      vagina  3. ONSET: \"When did the  symptoms  start?\"      1 week ago  4. PAIN: \"Is there any pain?\" If Yes, ask: \"How bad is it?\" (Scale: 1-10; mild, moderate, severe)      irritation  5. ITCHING: \"Is there any itching?\" If Yes, ask: \"How bad is it?\" (Scale: 1-10; mild, moderate, severe)      Yes mild itching  6. CAUSE: \"What do you think is causing the discharge?\" \"Have you had the same problem before?\" \"What happened then?\"      unsure  8. PREGNANCY: \"Is there any chance you are pregnant?\" \"When was your last menstrual period?\"      RULA 25    Protocols used: Vaginal Symptoms-Adult-[place]    "

## 2025-05-23 NOTE — TELEPHONE ENCOUNTER
Regarding: right rib pain  ----- Message from Candy DOYLE sent at 5/23/2025 10:26 AM EDT -----  Message converted to Call Hub message for triage.       5/23/25  2:38 AM    Hi Doctor Abdi!  As of recently I have had pain in my upper right rib that gets worse by the day and i'm unsure what to do. It began on the 11th with it only being if I coughed, sneezed, or took a deep breath. It has become a constant pain that is worsened by those same symptoms as well as sitting up and touching the area. I would describe it as a punching/stabbing pain. Between 1-10 at rest it is a 5, but during the symptoms that aggravate it, a 7. I visited the urgent care, who ultimately recommended I go to the ED since they were unable to find a cause on the 15th I believe. (Should show up in my records the exact date)  Since they also were unable to find anything at the ED I did not want to just go again, but as I said the pain just keeps getting worse, and is making it difficult to see through everyday tasks. Do you have a recommendations?  P. S. Applying heat or Ice does not alleviate the pain.  Thank you!

## 2025-05-23 NOTE — TELEPHONE ENCOUNTER
Pt called regarding iron infusions. Attempted to warm transfer to OB Navigator, Zhane, however teams # rings and then disconnected. Pt confirmed she is available any mornings for the iron infusions, but can also accommodate other times and will just make her employer aware. Please f/u at your earliest convenience. Thank you.

## 2025-05-23 NOTE — PATIENT INSTRUCTIONS
Perineal Hygiene      Your vaginal naturally takes care of its self, it is a self washing system, the less you mess the healthier it will be     Please do not use any anti bacterial soaps,  liquid soaps, body wash or feminine wash to the vulva, these products can cause dermitis, bacterial infections and other vulvar problems.   Your partner should avoid the same products as well to genital area.  Use only water to cleanse vulva, if you feel you need soap you may use a small amount of  Dove White Bar or Dove White Sensitive Skin Bar soap ( no liquid soap) if necessary.    Avoid the use of washcloths, exfoliating ro's, netted scrubbers, or loofa's, use your hands only to cleanse the vulvar tissues    No scented lotions or products are advised in or near your vulva.    Use coconut oil in its solid form for moisture if needed.  No douching this may cause imbalance in your vaginal PH and further issues.    If you wear panty liners, you may apply a thin coating of Vaseline, A&D ointment or coconut oil to the vulvar tissues as a skin barrier     Wear Cotton underware, and loose fitting clothing  Use perfume-free, dye-free laundry detergent for under garments, use a second rinse cycle   Avoid fabric softeners/dryer sheets.         Over the counter probiotic to restore vaginal des may be helpful as well, take daily.       You may also look into Boric Acid vaginal suppositories to restore vaginal PH balance for up to 2 weeks as directed on the box. You may not use these if you are pregnant      For vaginal dryness:      You may use:     Coconut oil in solid form, not liquid (organic, pure, unscented) as needed for moisture or lubrication. ( Do not use if allergic)       Replens moisture restore external comfort gel daily ( use as directed on the box)        Replens long lasting vaginal moisturizer  ( use as directed on the box)     May try Adku vulvar moisturizer ( found on [employer] )    May try Revaree vaginal  inserts        For Vaginal Lubrication:          You may use:     Coconut oil in solid form, not liquid (organic, pure, unscented) as a lubricant or another scent-free lubricant (Astroglide, Uberlube) if needed.  Do not use coconut oil or silicone if using a condom as this may break down the integrity of the condom and cause an unplanned pregnancy              Do not use coconut oil if allergic               Replens silky smooth lubricant, premium silicone based lubricant for intercourse. ( use as directed, a small amount will provide an enhanced natural feeling)     Any premium over the counter vaginal lubricant water or silicone based. Silicone based will have more staying power and friction relief         For Vulvar irritation/itching:        You may use Hydrocortisone 1 % over the counter to external vulvar tissues 2 x daily for 5-7 days to help with irriation and itch relief.

## 2025-05-23 NOTE — TELEPHONE ENCOUNTER
3 call attempts were made to patient with detailed voice messages to give the office a call back.

## 2025-05-23 NOTE — PROGRESS NOTES
Diagnoses and all orders for this visit:    Screening for STDs (sexually transmitted diseases)  -     Chlamydia/GC amplified DNA by PCR    Vaginal discharge    Rib pain on right side  -     POCT wet mount  -     Ambulatory referral to Physical Therapy; Future      Results for orders placed or performed in visit on 25   POCT wet mount   Result Value Ref Range    Yeast, Wet Prep Neg     pH 4.5     Whiff Test Neg     Clue Cells Neg     Trich, Wet Prep Neg        Reviewed perineal hygiene and products to avoid.  Reviewed medications and usage instructions     See AVS for more information   Call if no symptom improvement, all questions answered, return for annual.     Shyam Rizvi is a 24 y.o. female here for vaginal complaints. She reports yellowish vaginal discharge with itching for the past week, slight odor, slight burning with intercourse she is using honey pot for perineal cleansing and using baby wipes.  Discussed eliminating all of these products  Denies any treatments tried.   Denies recent antibiotic use. Denies douching.   Denies fever, pelvic pain. Denies new sexual partners.would like STD testing   She is 34.2 weeks pregnant appreciates good fetal movement upcoming prenatal visit scheduled  She also c/o upper right rib pain near axillary area, this has been occurring for sometime, would like to try PT for symptoms ,referral placed denies SOB or CP    Vitals:    25 0905   Weight: 82.1 kg (181 lb)     Body mass index is 30.12 kg/m².  No Known Allergies    Patient Active Problem List    Diagnosis Date Noted    Iron deficiency anemia during pregnancy 2025    Severe major depressive disorder (HCC) 2025    Umbilical hernia without obstruction and without gangrene 2025    History of herpes genitalis 2025    34 weeks gestation of pregnancy 2025    History of  premature rupture of membranes (PROM) in previous pregnancy, currently pregnant in second trimester  2025    Protein S deficiency affecting pregnancy ([facility]) 2024    Nausea and vomiting during pregnancy prior to 22 weeks gestation 2024    Sickle cell trait in mother affecting pregnancy ([facility]) 2024    Family history of congenital anomaly 2024    COVID-19 affecting pregnancy in first trimester 2024    History of  delivery, currently pregnant 2024    Choledocholithiasis 2023    Depression 2022     Past Medical History[1]  Past Surgical History[2]  Social History[3]  Current Medications[4]      OB History    Para Term  AB Living   3 1 0 1 1 1   SAB IAB Ectopic Multiple Live Births   1 0 0 0 1      # Outcome Date GA Lbr Mitchell/2nd Weight Sex Type Anes PTL Lv   3 Current            2 SAB 24 6w0d    SAB      1  22 35w5d / 00:15 2495 g (5 lb 8 oz) M Vag-Spont None Y NIRU      Complications:  premature rupture of membranes (PPROM) with onset of labor after 24 hours of rupture in first trimester, antepartum       Review of Systems   Constitutional: Negative for chills, fatigue, fever and unexpected weight change.   Respiratory: Negative for shortness of breath.    Gastrointestinal: Negative abdominal pain, constipation and diarrhea.   Genitourinary: Negative for difficulty urinating, dysuria and hematuria.     Physical Exam   Constitutional: Appears well-developed and well-nourished. No distress. Alert and oriented.  HENT: Atraumatic, Normocephalic.  Conjunctivae clear  Neck: Normal range of motion.   Pulmonary: Effort normal.  Abdominal: Soft. Negative for pain, tenderness or mass  Musculoskeletal: Normal ROM  Skin: Warm & Dry  Psychological: Normal mood, thought content, behavior & judgement       Pelvic exam was performed with patient supine, lithotomy position.      Labia: Right: Negative rash, tenderness, lesion or injury               Left: Negative rash, tenderness, lesion or injury   Urethral meatus:  Negative for   tenderness, inflammation or discharge.   Uterus: not deviated, enlarged, fixed or tender.   Cervix: No CMT, no discharge or friability.   Right adnexa: no mass, no tenderness and no fullness.  Left adnexa: no mass, no tenderness and no fullness.   Vagina: No erythema, tenderness, masses, or foreign body in the vagina. No signs of injury around the vagina. Heavy yellowish vaginal discharge   Perineum without lesions, signs of injury, erythema or swelling.  Inguinal Canal:        Right: No inguinal adenopathy or hernia present.        Left: No inguinal adenopathy or hernia present.                   [1]   Past Medical History:  Diagnosis Date    Anemia     Chlamydia 2019    COVID-19 11/26/2022 2024    Depression     Gonorrhea     2019   tx'd    Migraine     Sickle cell trait (HCC)     Varicella     childhood chickenpox   [2]   Past Surgical History:  Procedure Laterality Date    NO PAST SURGERIES     [3]   Social History  Tobacco Use    Smoking status: Never    Smokeless tobacco: Never   Vaping Use    Vaping status: Never Used   Substance Use Topics    Alcohol use: Not Currently     Comment: nothing after 1st month of pregnancy    Drug use: Not Currently     Types: Marijuana     Comment: marijuana-last used 2023-FOB-marijuana; denies parent use- fob's uncle-heroin   [4]   Current Outpatient Medications:     acetaminophen (TYLENOL) 650 mg CR tablet, Take 1 tablet (650 mg total) by mouth every 8 (eight) hours as needed for mild pain, Disp: 30 tablet, Rfl: 0    aspirin (ECOTRIN LOW STRENGTH) 81 mg EC tablet, Take 2 tablets (162 mg total) by mouth daily Stop at 36 weeks.  Contact your OB or MFM with any side effects.  See https://www.preeclampsia.org/aspirin, Disp: 60 tablet, Rfl: 3    ondansetron (ZOFRAN-ODT) 4 mg disintegrating tablet, Take 1 tablet (4 mg total) by mouth every 8 (eight) hours as needed for nausea or vomiting, Disp: 30 tablet, Rfl: 1    Prenatal Vit-Fe Fumarate-FA (PRENATAL PO), Take by mouth, Disp: ,  Rfl:     sertraline (Zoloft) 50 mg tablet, Take 1.5 tablets (75 mg total) by mouth daily, Disp: 30 tablet, Rfl: 1

## 2025-05-25 LAB
C TRACH DNA SPEC QL NAA+PROBE: NEGATIVE
N GONORRHOEA DNA SPEC QL NAA+PROBE: NEGATIVE

## 2025-05-26 ENCOUNTER — NURSE TRIAGE (OUTPATIENT)
Dept: OTHER | Facility: OTHER | Age: 25
End: 2025-05-26

## 2025-05-26 ENCOUNTER — TELEPHONE (OUTPATIENT)
Dept: OTHER | Facility: OTHER | Age: 25
End: 2025-05-26

## 2025-05-26 DIAGNOSIS — Z86.19 HISTORY OF HERPES GENITALIS: Primary | ICD-10-CM

## 2025-05-26 RX ORDER — VALACYCLOVIR HYDROCHLORIDE 500 MG/1
500 TABLET, FILM COATED ORAL 2 TIMES DAILY
Qty: 60 TABLET | Refills: 0 | Status: SHIPPED | OUTPATIENT
Start: 2025-05-26 | End: 2025-05-26 | Stop reason: CLARIF

## 2025-05-26 RX ORDER — VALACYCLOVIR HYDROCHLORIDE 1 G/1
1000 TABLET, FILM COATED ORAL DAILY
Qty: 5 TABLET | Refills: 0 | Status: SHIPPED | OUTPATIENT
Start: 2025-05-26 | End: 2025-05-26 | Stop reason: CLARIF

## 2025-05-26 RX ORDER — VALACYCLOVIR HYDROCHLORIDE 500 MG/1
500 TABLET, FILM COATED ORAL 2 TIMES DAILY
Qty: 60 TABLET | Refills: 0 | Status: SHIPPED | OUTPATIENT
Start: 2025-05-26 | End: 2025-06-25

## 2025-05-26 RX ORDER — VALACYCLOVIR HYDROCHLORIDE 1 G/1
1000 TABLET, FILM COATED ORAL DAILY
Qty: 5 TABLET | Refills: 0 | Status: SHIPPED | OUTPATIENT
Start: 2025-05-26 | End: 2025-05-31

## 2025-05-26 NOTE — TELEPHONE ENCOUNTER
"FOLLOW UP: NA    REASON FOR CONVERSATION: Pregnancy Problem    SYMPTOMS: Itching and sore by clitoris    OTHER: Was seen by office on 5.23.25 for itching/ STD testing done came back negative . Has history of HSV2.     DISPOSITION: Call PCP Now (overriding See PCP Within 24 Hours) / Spoke with Dr. Cheyenne Lindquist, she ordered Valtrex 1000mg once daily by mouth for 5 days for current symptoms. Then to start prophylaxis Valtrex 500mg 1 tablet by mouth 2 times a day through end of pregnancy. Prescription sent to [facility], which is now closed. Resent prescription to [facility] listed in chart. Patient made aware and verbalized understanding. Dr. Cheyenne Lindquist made aware of pharmacy change.           Reason for Disposition   Rash with painful tiny water blisters    Answer Assessment - Initial Assessment Questions  1. SYMPTOM: \"What's the main symptom you're concerned about?\" (e.g., rash, itching, swelling, dryness)      Itching, sore  2. LOCATION: \"Where is the sore located?\" (e.g., inside/outside, left/right)      Clitoris  3. ONSET: \"When did the  symptoms  start?\"      Started 2 days ago  4. PAIN: \"Is there any pain?\" If Yes, ask: \"How bad is it?\" (Scale: 1-10; mild, moderate, severe)      Moderate  5. CAUSE: \"What do you think is causing the symptoms?\"      HSV-1  6. OTHER SYMPTOMS: \"Do you have any other symptoms?\" (e.g., fever, vaginal bleeding, pain with urination)      Denies  7. RULA: \"What date are you expecting to deliver?\"       7.2.25  8. PREGNANCY: \"How many weeks pregnant are you?\"      34w / 5d    Protocols used: Pregnancy - Vulvar Symptoms-Adult-[facility]    "

## 2025-05-26 NOTE — TELEPHONE ENCOUNTER
Reason for Disposition  • [1] Tingling or burning of vulvar area AND [2] feels like prior genital herpes    Protocols used: Pregnancy - Vulvar Symptoms-Adult-AH

## 2025-05-26 NOTE — TELEPHONE ENCOUNTER
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 I have 1 or 2 sores near my clitoris and they are painful and itchy. I would like to know if I can have medication called in to my pharmacy.\"    Pharmacy:  [facility]  [facility] [place] [address] [phone]    "

## 2025-05-26 NOTE — TELEPHONE ENCOUNTER
Patient is calling regarding cancelling an appointment.    Date/Time: 5/27/25 @ 8:45am    Patient was rescheduled: YES [] NO [x]    Patient requesting call back to reschedule: YES [x] NO []    Please call patient when office reopens.

## 2025-05-26 NOTE — TELEPHONE ENCOUNTER
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 I have 1 or 2 sores near my clitoris and they are painful and itchy. I would like to know if I can have medication called in to my pharmacy.\"     Pharmacy:   [facility]   [facility] [place] [address] [phone]     Previous hub was closed due to three call attempts. Patient rehubbed due to missed calls.    "

## 2025-05-27 ENCOUNTER — TELEPHONE (OUTPATIENT)
Dept: INFUSION CENTER | Facility: CLINIC | Age: 25
End: 2025-05-27

## 2025-05-27 ENCOUNTER — RESULTS FOLLOW-UP (OUTPATIENT)
Dept: OBGYN CLINIC | Facility: CLINIC | Age: 25
End: 2025-05-27

## 2025-05-27 NOTE — TELEPHONE ENCOUNTER
NEW PATIENT PHONE CALL    Patient contacted, voicemail left, reminded of appointment time, policies and procedures reviewed with patient.     Appointment:  5/29/25 @ 1400

## 2025-06-03 ENCOUNTER — HOSPITAL ENCOUNTER (OUTPATIENT)
Dept: INFUSION CENTER | Facility: CLINIC | Age: 25
Discharge: HOME | End: 2025-06-03
Attending: OBSTETRICS & GYNECOLOGY
Payer: COMMERCIAL

## 2025-06-03 VITALS
RESPIRATION RATE: 18 BRPM | SYSTOLIC BLOOD PRESSURE: 109 MMHG | DIASTOLIC BLOOD PRESSURE: 67 MMHG | TEMPERATURE: 97.3 F | HEART RATE: 92 BPM

## 2025-06-03 DIAGNOSIS — O99.019 IRON DEFICIENCY ANEMIA DURING PREGNANCY: Primary | ICD-10-CM

## 2025-06-03 DIAGNOSIS — D50.9 IRON DEFICIENCY ANEMIA DURING PREGNANCY: Primary | ICD-10-CM

## 2025-06-03 PROCEDURE — 96365 THER/PROPH/DIAG IV INF INIT: CPT

## 2025-06-03 RX ORDER — SODIUM CHLORIDE 9 MG/ML
20 INJECTION, SOLUTION INTRAVENOUS ONCE
Status: CANCELLED | OUTPATIENT
Start: 2025-06-06

## 2025-06-03 RX ORDER — SODIUM CHLORIDE 9 MG/ML
20 INJECTION, SOLUTION INTRAVENOUS ONCE
Status: COMPLETED | OUTPATIENT
Start: 2025-06-03 | End: 2025-06-03

## 2025-06-03 RX ADMIN — SODIUM CHLORIDE 20 ML/HR: 9 INJECTION, SOLUTION INTRAVENOUS at 08:39

## 2025-06-03 RX ADMIN — IRON SUCROSE 200 MG: 20 INJECTION, SOLUTION INTRAVENOUS at 08:46

## 2025-06-03 NOTE — PROGRESS NOTES
Pt here today for an iron infusion, offers no complaints, infusion completed w/o complications, aware of there next appt on 6/6 at 8 am, AVS given,and pt D/C home

## 2025-06-06 ENCOUNTER — HOSPITAL ENCOUNTER (OUTPATIENT)
Dept: INFUSION CENTER | Facility: CLINIC | Age: 25
End: 2025-06-06
Attending: OBSTETRICS & GYNECOLOGY
Payer: COMMERCIAL

## 2025-06-06 VITALS
SYSTOLIC BLOOD PRESSURE: 116 MMHG | TEMPERATURE: 96.9 F | DIASTOLIC BLOOD PRESSURE: 58 MMHG | RESPIRATION RATE: 18 BRPM | HEART RATE: 101 BPM

## 2025-06-06 DIAGNOSIS — D50.9 IRON DEFICIENCY ANEMIA DURING PREGNANCY: Primary | ICD-10-CM

## 2025-06-06 DIAGNOSIS — O99.019 IRON DEFICIENCY ANEMIA DURING PREGNANCY: Primary | ICD-10-CM

## 2025-06-06 PROCEDURE — 96365 THER/PROPH/DIAG IV INF INIT: CPT

## 2025-06-06 RX ORDER — SODIUM CHLORIDE 9 MG/ML
20 INJECTION, SOLUTION INTRAVENOUS ONCE
OUTPATIENT
Start: 2025-06-09

## 2025-06-06 RX ORDER — SODIUM CHLORIDE 9 MG/ML
20 INJECTION, SOLUTION INTRAVENOUS ONCE
Status: COMPLETED | OUTPATIENT
Start: 2025-06-06 | End: 2025-06-06

## 2025-06-06 RX ADMIN — IRON SUCROSE 200 MG: 20 INJECTION, SOLUTION INTRAVENOUS at 08:22

## 2025-06-06 RX ADMIN — SODIUM CHLORIDE 20 ML/HR: 9 INJECTION, SOLUTION INTRAVENOUS at 08:23

## 2025-06-06 NOTE — PROGRESS NOTES
Pt here for venofer infusion, offering no complaints. Peripheral IV placed with positive blood return noted. Tolerated infusion without incident. Peripheral IV removed. AVS declined. Walked out in stable condition. Next appointment on 6/9/25 at 12:30pm.

## 2025-06-10 ENCOUNTER — TELEPHONE (OUTPATIENT)
Dept: INFUSION CENTER | Facility: CLINIC | Age: 25
End: 2025-06-10

## 2025-06-10 NOTE — TELEPHONE ENCOUNTER
Lvm reviewing No Show policy and next scheduled appt for tomorrow 6/11 at 4pm. Left call back number if she needs to reschedule.

## 2025-06-10 NOTE — TELEPHONE ENCOUNTER
Patient has 2 no shows within a 6 month period.  Patient no showed on 5/29/25 and 6/9/25.  Infusion techs please contact the patient and review the no show policy as well as her next scheduled infusion appt.

## 2025-06-11 ENCOUNTER — HOSPITAL ENCOUNTER (INPATIENT)
Facility: HOSPITAL | Age: 25
LOS: 2 days | Discharge: HOME | End: 2025-06-13
Attending: STUDENT IN AN ORGANIZED HEALTH CARE EDUCATION/TRAINING PROGRAM | Admitting: STUDENT IN AN ORGANIZED HEALTH CARE EDUCATION/TRAINING PROGRAM
Payer: COMMERCIAL

## 2025-06-11 ENCOUNTER — HOSPITAL ENCOUNTER (EMERGENCY)
Facility: HOSPITAL | Age: 25
End: 2025-06-11
Attending: EMERGENCY MEDICINE
Payer: COMMERCIAL

## 2025-06-11 ENCOUNTER — HOSPITAL ENCOUNTER (OUTPATIENT)
Dept: INFUSION CENTER | Facility: CLINIC | Age: 25
Discharge: HOME | End: 2025-06-11
Attending: OBSTETRICS & GYNECOLOGY

## 2025-06-11 VITALS
TEMPERATURE: 98.9 F | HEART RATE: 98 BPM | SYSTOLIC BLOOD PRESSURE: 110 MMHG | DIASTOLIC BLOOD PRESSURE: 68 MMHG | RESPIRATION RATE: 20 BRPM | OXYGEN SATURATION: 98 %

## 2025-06-11 DIAGNOSIS — Z3A.37 37 WEEKS GESTATION OF PREGNANCY: Primary | ICD-10-CM

## 2025-06-11 DIAGNOSIS — O47.9 UTERINE CONTRACTIONS: ICD-10-CM

## 2025-06-11 DIAGNOSIS — R11.10 VOMITING: Primary | ICD-10-CM

## 2025-06-11 PROBLEM — O42.90 LEAKAGE OF AMNIOTIC FLUID: Status: ACTIVE | Noted: 2025-06-11

## 2025-06-11 LAB
ABO GROUP BLD: NORMAL
ALBUMIN SERPL BCG-MCNC: 3.3 G/DL (ref 3.5–5)
ALP SERPL-CCNC: 208 U/L (ref 34–104)
ALT SERPL W P-5'-P-CCNC: 27 U/L (ref 7–52)
ANION GAP SERPL CALCULATED.3IONS-SCNC: 9 MMOL/L (ref 4–13)
AST SERPL W P-5'-P-CCNC: 34 U/L (ref 13–39)
BACTERIA UR QL AUTO: ABNORMAL /HPF
BASE EXCESS BLDCOA CALC-SCNC: -3.1 MMOL/L (ref 3–11)
BASE EXCESS BLDCOV CALC-SCNC: -2.5 MMOL/L (ref 1–9)
BASOPHILS # BLD AUTO: 0.02 THOUSANDS/ÂΜL (ref 0–0.1)
BASOPHILS # BLD AUTO: 0.03 THOUSANDS/ÂΜL (ref 0–0.1)
BASOPHILS NFR BLD AUTO: 0 % (ref 0–1)
BASOPHILS NFR BLD AUTO: 0 % (ref 0–1)
BILIRUB DIRECT SERPL-MCNC: 0.05 MG/DL (ref 0–0.2)
BILIRUB SERPL-MCNC: 0.31 MG/DL (ref 0.2–1)
BILIRUB UR QL STRIP: NEGATIVE
BLD GP AB SCN SERPL QL: NEGATIVE
BUN SERPL-MCNC: 5 MG/DL (ref 5–25)
CALCIUM SERPL-MCNC: 8.6 MG/DL (ref 8.4–10.2)
CHLORIDE SERPL-SCNC: 105 MMOL/L (ref 96–108)
CLARITY UR: CLEAR
CO2 SERPL-SCNC: 20 MMOL/L (ref 21–32)
COLOR UR: ABNORMAL
CREAT SERPL-MCNC: 0.49 MG/DL (ref 0.6–1.3)
EOSINOPHIL # BLD AUTO: 0.02 THOUSAND/ÂΜL (ref 0–0.61)
EOSINOPHIL # BLD AUTO: 0.03 THOUSAND/ÂΜL (ref 0–0.61)
EOSINOPHIL NFR BLD AUTO: 0 % (ref 0–6)
EOSINOPHIL NFR BLD AUTO: 0 % (ref 0–6)
ERYTHROCYTE [DISTWIDTH] IN BLOOD BY AUTOMATED COUNT: 16.4 % (ref 11.6–15.1)
ERYTHROCYTE [DISTWIDTH] IN BLOOD BY AUTOMATED COUNT: 17 % (ref 11.6–15.1)
EXTERNAL GROUP B STREP ANTIGEN: NORMAL
GFR SERPL CREATININE-BSD FRML MDRD: 135 ML/MIN/1.73SQ M
GLUCOSE SERPL-MCNC: 91 MG/DL (ref 65–140)
GLUCOSE UR STRIP-MCNC: NEGATIVE MG/DL
HCO3 BLDCOA-SCNC: 21.3 MMOL/L (ref 17.3–27.3)
HCO3 BLDCOV-SCNC: 22 MMOL/L (ref 12.2–28.6)
HCT VFR BLD AUTO: 32.2 % (ref 34.8–46.1)
HCT VFR BLD AUTO: 32.7 % (ref 34.8–46.1)
HGB BLD-MCNC: 10.1 G/DL (ref 11.5–15.4)
HGB BLD-MCNC: 10.4 G/DL (ref 11.5–15.4)
HGB UR QL STRIP.AUTO: NEGATIVE
HOLD SPECIMEN: NORMAL
HYALINE CASTS #/AREA URNS LPF: ABNORMAL /LPF
IMM GRANULOCYTES # BLD AUTO: 0.17 THOUSAND/UL (ref 0–0.2)
IMM GRANULOCYTES # BLD AUTO: 0.17 THOUSAND/UL (ref 0–0.2)
IMM GRANULOCYTES NFR BLD AUTO: 2 % (ref 0–2)
IMM GRANULOCYTES NFR BLD AUTO: 2 % (ref 0–2)
KETONES UR STRIP-MCNC: ABNORMAL MG/DL
LEUKOCYTE ESTERASE UR QL STRIP: ABNORMAL
LYMPHOCYTES # BLD AUTO: 0.82 THOUSANDS/ÂΜL (ref 0.6–4.47)
LYMPHOCYTES # BLD AUTO: 0.98 THOUSANDS/ÂΜL (ref 0.6–4.47)
LYMPHOCYTES NFR BLD AUTO: 11 % (ref 14–44)
LYMPHOCYTES NFR BLD AUTO: 8 % (ref 14–44)
MCH RBC QN AUTO: 25.6 PG (ref 26.8–34.3)
MCH RBC QN AUTO: 26.6 PG (ref 26.8–34.3)
MCHC RBC AUTO-ENTMCNC: 31.4 G/DL (ref 31.4–37.4)
MCHC RBC AUTO-ENTMCNC: 31.8 G/DL (ref 31.4–37.4)
MCV RBC AUTO: 82 FL (ref 82–98)
MCV RBC AUTO: 84 FL (ref 82–98)
MONOCYTES # BLD AUTO: 0.46 THOUSAND/ÂΜL (ref 0.17–1.22)
MONOCYTES # BLD AUTO: 0.47 THOUSAND/ÂΜL (ref 0.17–1.22)
MONOCYTES NFR BLD AUTO: 5 % (ref 4–12)
MONOCYTES NFR BLD AUTO: 5 % (ref 4–12)
NEUTROPHILS # BLD AUTO: 6.93 THOUSANDS/ÂΜL (ref 1.85–7.62)
NEUTROPHILS # BLD AUTO: 8.78 THOUSANDS/ÂΜL (ref 1.85–7.62)
NEUTS SEG NFR BLD AUTO: 82 % (ref 43–75)
NEUTS SEG NFR BLD AUTO: 85 % (ref 43–75)
NITRITE UR QL STRIP: NEGATIVE
NON-SQ EPI CELLS URNS QL MICRO: ABNORMAL /HPF
NRBC BLD AUTO-RTO: 0 /100 WBCS
NRBC BLD AUTO-RTO: 0 /100 WBCS
O2 CT VFR BLDCOA CALC: 15.8 ML/DL
OXYHGB MFR BLDCOA: 76.6 %
OXYHGB MFR BLDCOV: 69.6 %
PCO2 BLDCOA: 36.2 MM[HG] (ref 30–60)
PCO2 BLDCOV: 37.5 MM HG (ref 27–43)
PH BLDCOA: 7.39 [PH] (ref 7.23–7.43)
PH BLDCOV: 7.39 [PH] (ref 7.19–7.49)
PH UR STRIP.AUTO: 6.5 [PH]
PLATELET # BLD AUTO: 260 THOUSANDS/UL (ref 149–390)
PLATELET # BLD AUTO: 267 THOUSANDS/UL (ref 149–390)
PMV BLD AUTO: 10.4 FL (ref 8.9–12.7)
PMV BLD AUTO: 10.8 FL (ref 8.9–12.7)
PO2 BLDCOA: 31.5 MM HG (ref 5–25)
PO2 BLDCOV: 28.1 MM HG (ref 15–45)
POTASSIUM SERPL-SCNC: 3.5 MMOL/L (ref 3.5–5.3)
PROT SERPL-MCNC: 7 G/DL (ref 6.4–8.4)
PROT UR STRIP-MCNC: NEGATIVE MG/DL
RBC # BLD AUTO: 3.91 MILLION/UL (ref 3.81–5.12)
RBC # BLD AUTO: 3.95 MILLION/UL (ref 3.81–5.12)
RBC #/AREA URNS AUTO: ABNORMAL /HPF
RH BLD: POSITIVE
SAO2 % BLDCOV: 14.6 ML/DL
SODIUM SERPL-SCNC: 134 MMOL/L (ref 135–147)
SP GR UR STRIP.AUTO: 1.01 (ref 1–1.03)
SPECIMEN EXPIRATION DATE: NORMAL
UROBILINOGEN UR STRIP-ACNC: <2 MG/DL
WBC # BLD AUTO: 10.28 THOUSAND/UL (ref 4.31–10.16)
WBC # BLD AUTO: 8.6 THOUSAND/UL (ref 4.31–10.16)
WBC #/AREA URNS AUTO: ABNORMAL /HPF

## 2025-06-11 PROCEDURE — 4A1HXCZ MONITORING OF PRODUCTS OF CONCEPTION, CARDIAC RATE, EXTERNAL APPROACH: ICD-10-PCS | Performed by: STUDENT IN AN ORGANIZED HEALTH CARE EDUCATION/TRAINING PROGRAM

## 2025-06-11 PROCEDURE — 85025 COMPLETE CBC W/AUTO DIFF WBC: CPT | Performed by: EMERGENCY MEDICINE

## 2025-06-11 PROCEDURE — 80048 BASIC METABOLIC PNL TOTAL CA: CPT | Performed by: EMERGENCY MEDICINE

## 2025-06-11 PROCEDURE — 59400 OBSTETRICAL CARE: CPT | Performed by: STUDENT IN AN ORGANIZED HEALTH CARE EDUCATION/TRAINING PROGRAM

## 2025-06-11 PROCEDURE — 99214 OFFICE O/P EST MOD 30 MIN: CPT

## 2025-06-11 PROCEDURE — 85025 COMPLETE CBC W/AUTO DIFF WBC: CPT

## 2025-06-11 PROCEDURE — 99284 EMERGENCY DEPT VISIT MOD MDM: CPT

## 2025-06-11 PROCEDURE — 86780 TREPONEMA PALLIDUM: CPT

## 2025-06-11 PROCEDURE — 81001 URINALYSIS AUTO W/SCOPE: CPT | Performed by: EMERGENCY MEDICINE

## 2025-06-11 PROCEDURE — 86850 RBC ANTIBODY SCREEN: CPT

## 2025-06-11 PROCEDURE — 10907ZC DRAINAGE OF AMNIOTIC FLUID, THERAPEUTIC FROM PRODUCTS OF CONCEPTION, VIA NATURAL OR ARTIFICIAL OPENING: ICD-10-PCS | Performed by: STUDENT IN AN ORGANIZED HEALTH CARE EDUCATION/TRAINING PROGRAM

## 2025-06-11 PROCEDURE — 80076 HEPATIC FUNCTION PANEL: CPT | Performed by: EMERGENCY MEDICINE

## 2025-06-11 PROCEDURE — 86901 BLOOD TYPING SEROLOGIC RH(D): CPT

## 2025-06-11 PROCEDURE — 87150 DNA/RNA AMPLIFIED PROBE: CPT

## 2025-06-11 PROCEDURE — 36415 COLL VENOUS BLD VENIPUNCTURE: CPT | Performed by: EMERGENCY MEDICINE

## 2025-06-11 PROCEDURE — 82805 BLOOD GASES W/O2 SATURATION: CPT

## 2025-06-11 PROCEDURE — NC001 PR NO CHARGE: Performed by: STUDENT IN AN ORGANIZED HEALTH CARE EDUCATION/TRAINING PROGRAM

## 2025-06-11 PROCEDURE — 99285 EMERGENCY DEPT VISIT HI MDM: CPT | Performed by: EMERGENCY MEDICINE

## 2025-06-11 PROCEDURE — 86900 BLOOD TYPING SEROLOGIC ABO: CPT

## 2025-06-11 PROCEDURE — 96360 HYDRATION IV INFUSION INIT: CPT

## 2025-06-11 RX ORDER — DIPHENHYDRAMINE HYDROCHLORIDE 50 MG/ML
25 INJECTION, SOLUTION INTRAMUSCULAR; INTRAVENOUS EVERY 6 HOURS PRN
Status: DISCONTINUED | OUTPATIENT
Start: 2025-06-11 | End: 2025-06-13 | Stop reason: HOSPADM

## 2025-06-11 RX ORDER — BENZOCAINE/MENTHOL 6 MG-10 MG
1 LOZENGE MUCOUS MEMBRANE DAILY PRN
Status: DISCONTINUED | OUTPATIENT
Start: 2025-06-11 | End: 2025-06-13 | Stop reason: HOSPADM

## 2025-06-11 RX ORDER — ONDANSETRON 2 MG/ML
4 INJECTION INTRAMUSCULAR; INTRAVENOUS EVERY 6 HOURS PRN
Status: DISCONTINUED | OUTPATIENT
Start: 2025-06-11 | End: 2025-06-11

## 2025-06-11 RX ORDER — ACETAMINOPHEN 325 MG/1
650 TABLET ORAL EVERY 6 HOURS PRN
Status: DISCONTINUED | OUTPATIENT
Start: 2025-06-11 | End: 2025-06-13 | Stop reason: HOSPADM

## 2025-06-11 RX ORDER — SERTRALINE HYDROCHLORIDE 25 MG/1
75 TABLET, FILM COATED ORAL DAILY
Status: DISCONTINUED | OUTPATIENT
Start: 2025-06-12 | End: 2025-06-13 | Stop reason: HOSPADM

## 2025-06-11 RX ORDER — DOCUSATE SODIUM 100 MG/1
100 CAPSULE, LIQUID FILLED ORAL 2 TIMES DAILY
Status: DISCONTINUED | OUTPATIENT
Start: 2025-06-12 | End: 2025-06-13 | Stop reason: HOSPADM

## 2025-06-11 RX ORDER — CALCIUM CARBONATE 500 MG/1
1000 TABLET, CHEWABLE ORAL DAILY PRN
Status: DISCONTINUED | OUTPATIENT
Start: 2025-06-11 | End: 2025-06-13 | Stop reason: HOSPADM

## 2025-06-11 RX ORDER — OXYCODONE HYDROCHLORIDE 5 MG/1
5 TABLET ORAL ONCE
Status: COMPLETED | OUTPATIENT
Start: 2025-06-11 | End: 2025-06-11

## 2025-06-11 RX ORDER — KETOROLAC TROMETHAMINE 30 MG/ML
15 INJECTION, SOLUTION INTRAMUSCULAR; INTRAVENOUS ONCE
Status: COMPLETED | OUTPATIENT
Start: 2025-06-11 | End: 2025-06-11

## 2025-06-11 RX ORDER — OXYTOCIN/0.9 % SODIUM CHLORIDE 30/500 ML
250 PLASTIC BAG, INJECTION (ML) INTRAVENOUS ONCE
Status: COMPLETED | OUTPATIENT
Start: 2025-06-11 | End: 2025-06-11

## 2025-06-11 RX ORDER — BUPIVACAINE HYDROCHLORIDE 2.5 MG/ML
30 INJECTION, SOLUTION EPIDURAL; INFILTRATION; INTRACAUDAL; PERINEURAL ONCE AS NEEDED
Status: DISCONTINUED | OUTPATIENT
Start: 2025-06-11 | End: 2025-06-11

## 2025-06-11 RX ORDER — IBUPROFEN 600 MG/1
600 TABLET, FILM COATED ORAL EVERY 6 HOURS
Status: DISCONTINUED | OUTPATIENT
Start: 2025-06-11 | End: 2025-06-13 | Stop reason: HOSPADM

## 2025-06-11 RX ORDER — SODIUM CHLORIDE, SODIUM LACTATE, POTASSIUM CHLORIDE, CALCIUM CHLORIDE 600; 310; 30; 20 MG/100ML; MG/100ML; MG/100ML; MG/100ML
125 INJECTION, SOLUTION INTRAVENOUS CONTINUOUS
Status: DISCONTINUED | OUTPATIENT
Start: 2025-06-11 | End: 2025-06-11

## 2025-06-11 RX ORDER — OXYTOCIN/0.9 % SODIUM CHLORIDE 30/500 ML
PLASTIC BAG, INJECTION (ML) INTRAVENOUS
Status: DISPENSED
Start: 2025-06-11 | End: 2025-06-12

## 2025-06-11 RX ORDER — ONDANSETRON 2 MG/ML
4 INJECTION INTRAMUSCULAR; INTRAVENOUS EVERY 8 HOURS PRN
Status: DISCONTINUED | OUTPATIENT
Start: 2025-06-11 | End: 2025-06-13 | Stop reason: HOSPADM

## 2025-06-11 RX ORDER — VALACYCLOVIR HYDROCHLORIDE 500 MG/1
500 TABLET, FILM COATED ORAL 2 TIMES DAILY
Status: DISCONTINUED | OUTPATIENT
Start: 2025-06-11 | End: 2025-06-13 | Stop reason: HOSPADM

## 2025-06-11 RX ADMIN — VALACYCLOVIR HYDROCHLORIDE 500 MG: 500 TABLET, FILM COATED ORAL at 21:01

## 2025-06-11 RX ADMIN — SODIUM CHLORIDE, SODIUM LACTATE, POTASSIUM CHLORIDE, AND CALCIUM CHLORIDE 925 ML/HR: .6; .31; .03; .02 INJECTION, SOLUTION INTRAVENOUS at 20:47

## 2025-06-11 RX ADMIN — SODIUM CHLORIDE 1000 ML: 0.9 INJECTION, SOLUTION INTRAVENOUS at 17:38

## 2025-06-11 RX ADMIN — Medication 250 MILLI-UNITS/MIN: at 22:36

## 2025-06-11 RX ADMIN — OXYCODONE HYDROCHLORIDE 5 MG: 5 TABLET ORAL at 23:10

## 2025-06-11 RX ADMIN — ACETAMINOPHEN 650 MG: 325 TABLET ORAL at 23:03

## 2025-06-11 RX ADMIN — WITCH HAZEL 1 PAD: 500 SOLUTION RECTAL; TOPICAL at 23:03

## 2025-06-11 RX ADMIN — BENZOCAINE AND LEVOMENTHOL 1 APPLICATION: 200; 5 SPRAY TOPICAL at 23:03

## 2025-06-11 RX ADMIN — KETOROLAC TROMETHAMINE 15 MG: 30 INJECTION, SOLUTION INTRAMUSCULAR; INTRAVENOUS at 22:50

## 2025-06-11 RX ADMIN — HYDROCORTISONE 1 APPLICATION: 1 CREAM TOPICAL at 23:03

## 2025-06-11 NOTE — ED PROVIDER NOTES
ED Disposition       None          Assessment & Plan   {Hyperlinks  Risk Stratification - NIHSS - HEART SCORE - Fill out sepsis note and make sure you call [id number] if severe or septic shock:[id number]}    Medical Decision Making  Amount and/or Complexity of Data Reviewed  Labs: ordered.        ED Course as of 06/11/25 2339   Wed Jun 11, 2025 1810 I was called to the room as patient feels her water broke while in the emergency department.  She denies feeling any contractions at this time.   1812 Sterile vaginal exam. Head is engaged. 4-5 cm. Clear fluid.   1830 Patient was accepted to [facility] by Dr. Barlow.  EMS is in round.  Patient is awake and alert and oriented.  EMTALA forms were signed.       Medications   sodium chloride 0.9 % bolus 1,000 mL (has no administration in time range)       ED Risk Strat Scores                    No data recorded                            History of Present Illness   {Hyperlinks  History (Med, Surg, Fam, Social) - Current Medications - Allergies  :[id number]}    Chief Complaint   Patient presents with   • Vomiting     37 weeks pregnant, was at infusion for iron treatment but sent in for dehydration. States last night she couldn't hold any food or liquids down    • Pregnancy Problem       Past Medical History[1]   Past Surgical History[2]   Family History[3]   Social History[4]   E-Cigarette/Vaping   • E-Cigarette Use Never User       E-Cigarette/Vaping Substances   • Nicotine No    • THC No    • CBD No    • Flavoring No    • Other No    • Unknown No       I have reviewed and agree with the history as documented.     Patient presents to the emergency department with concern for dehydration.  Patient was vomiting last night and was unable to tolerate p.o. fluids.  No diarrhea.  Some abdominal cramping but states it does not feel like prior contractions.      Review of Systems        Objective   {Hyperlinks  Historical Vitals - Historical Labs - Chart  Review/Microbiology - Last Echo - Code Status  :[id number]}    ED Triage Vitals [06/11/25 1625]   Temperature Pulse Blood Pressure Respirations SpO2 Patient Position - Orthostatic VS   98.9 °F (37.2 °C) 57 103/61 18 100 % --      Temp Source Heart Rate Source BP Location FiO2 (%) Pain Score    Temporal Monitor Left arm -- --      Vitals      Date and Time Temp Pulse SpO2 Resp BP Pain Score FACES Pain Rating User   06/11/25 1625 98.9 °F (37.2 °C) 57 100 % 18 103/61 -- -- AS            Physical Exam    Results Reviewed       Procedure Component Value Units Date/Time    CBC and differential [[id number]]     Lab Status: No result Specimen: Blood     Basic metabolic panel [[id number]]     Lab Status: No result Specimen: Blood     Hepatic function panel [[id number]]     Lab Status: No result Specimen: Blood     UA (URINE) with reflex to Scope [[id number]]     Lab Status: No result Specimen: Urine             No orders to display       Procedures    ED Medication and Procedure Management   Prior to Admission Medications   Prescriptions Last Dose Informant Patient Reported? Taking?   Prenatal Vit-Fe Fumarate-FA (PRENATAL PO)  Self Yes No   Sig: Take by mouth   acetaminophen (TYLENOL) 650 mg CR tablet  Self No No   Sig: Take 1 tablet (650 mg total) by mouth every 8 (eight) hours as needed for mild pain   aspirin (ECOTRIN LOW STRENGTH) 81 mg EC tablet  Self No No   Sig: Take 2 tablets (162 mg total) by mouth daily Stop at 36 weeks.  Contact your OB or MFM with any side effects.  See https://www.preeclampsia.org/aspirin   ondansetron (ZOFRAN-ODT) 4 mg disintegrating tablet   No No   Sig: Take 1 tablet (4 mg total) by mouth every 8 (eight) hours as needed for nausea or vomiting   sertraline (Zoloft) 50 mg tablet   No No   Sig: Take 1.5 tablets (75 mg total) by mouth daily   valACYclovir (VALTREX) 1,000 mg tablet   No No   Sig: Take 1 tablet (1,000 mg total) by mouth daily for 5 days   valACYclovir (VALTREX) 500 mg tablet   No No    Sig: Take 1 tablet (500 mg total) by mouth 2 (two) times a day      Facility-Administered Medications: None     Patient's Medications   Discharge Prescriptions    No medications on file     No discharge procedures on file.  ED SEPSIS DOCUMENTATION                [1]  Past Medical History:  Diagnosis Date   • Anemia    • Chlamydia 2019   • COVID-19 11/26/2022 2024   • Depression    • Gonorrhea     2019   tx'd   • Migraine    • Sickle cell trait (HCC)    • Varicella     childhood chickenpox   [2]  Past Surgical History:  Procedure Laterality Date   • NO PAST SURGERIES     [3]  Family History  Problem Relation Name Age of Onset   • Mental illness Mother Trang    • Depression Mother Trang    • Gout Mother Trang    • Sickle cell trait Mother Trang    • Sickle cell trait Father Chavo    • Pulmonary embolism Father Chavo    • Hypertension Half-Brother Morgan    • Panic disorder Half-Brother Morgan    • Depression Half-Brother Brendaner    • Stroke Maternal Grandmother Sintia    • Diabetes Maternal Grandmother Sintia    • Alcohol abuse Maternal Grandfather     • Cancer Paternal Grandmother Marquita    • Cancer Paternal Grandfather Santos    • Breast cancer Cousin Maternal Cousin    • Schizophrenia Half-Brother Tony ,    • Sickle cell trait Half-Brother Tony Jr,    • Colon cancer Neg Hx     • Ovarian cancer Neg Hx     • Uterine cancer Neg Hx     • Cervical cancer Neg Hx     [4]  Social History  Tobacco Use   • Smoking status: Never   • Smokeless tobacco: Never   Vaping Use   • Vaping status: Never Used   Substance Use Topics   • Alcohol use: Not Currently     Comment: nothing after 1st month of pregnancy   • Drug use: Not Currently     Types: Marijuana     Comment: marijuana-last used 2023-FOB-marijuana; denies parent use- fob's uncle-heroin    [[id number]]  (Abnormal) Collected: 06/11/25 1738    Lab Status: Final result Specimen: Blood from Arm, Left Updated: 06/11/25 1803     Sodium 134 mmol/L      Potassium 3.5 mmol/L      Chloride 105 mmol/L      CO2 20 mmol/L      ANION GAP 9 mmol/L      BUN 5 mg/dL      Creatinine 0.49 mg/dL      Glucose 91 mg/dL      Calcium 8.6 mg/dL      eGFR 135 ml/min/1.73sq m     Narrative:      National Kidney Disease Foundation guidelines for Chronic Kidney Disease (CKD):     Stage 1 with normal or high GFR (GFR > 90 mL/min/1.73 square meters)    Stage 2 Mild CKD (GFR = 60-89 mL/min/1.73 square meters)    Stage 3A Moderate CKD (GFR = 45-59 mL/min/1.73 square meters)    Stage 3B Moderate CKD (GFR = 30-44 mL/min/1.73 square meters)    Stage 4 Severe CKD (GFR = 15-29 mL/min/1.73 square meters)    Stage 5 End Stage CKD (GFR <15 mL/min/1.73 square meters)  Note: GFR calculation is accurate only with a steady state creatinine    Hepatic function panel [[id number]]  (Abnormal) Collected: 06/11/25 1738    Lab Status: Final result Specimen: Blood from Arm, Left Updated: 06/11/25 1803     Total Bilirubin 0.31 mg/dL      Bilirubin, Direct 0.05 mg/dL      Alkaline Phosphatase 208 U/L      AST 34 U/L      ALT 27 U/L      Total Protein 7.0 g/dL      Albumin 3.3 g/dL     CBC and differential [[id number]]  (Abnormal) Collected: 06/11/25 1738    Lab Status: Final result Specimen: Blood from Arm, Left Updated: 06/11/25 1743     WBC 10.28 Thousand/uL      RBC 3.95 Million/uL      Hemoglobin 10.1 g/dL      Hematocrit 32.2 %      MCV 82 fL      MCH 25.6 pg      MCHC 31.4 g/dL      RDW 16.4 %      MPV 10.4 fL      Platelets 260 Thousands/uL      nRBC 0 /100 WBCs      Segmented % 85 %      Immature Grans % 2 %      Lymphocytes % 8 %      Monocytes % 5 %      Eosinophils Relative 0 %      Basophils Relative 0 %      Absolute Neutrophils 8.78 Thousands/µL      Absolute Immature Grans 0.17 Thousand/uL      Absolute Lymphocytes 0.82 Thousands/µL       Absolute Monocytes 0.47 Thousand/µL      Eosinophils Absolute 0.02 Thousand/µL      Basophils Absolute 0.02 Thousands/µL             No orders to display       Procedures    ED Medication and Procedure Management   Prior to Admission Medications   Prescriptions Last Dose Informant Patient Reported? Taking?   Prenatal Vit-Fe Fumarate-FA (PRENATAL PO)  Self Yes No   Sig: Take by mouth   acetaminophen (TYLENOL) 650 mg CR tablet  Self No No   Sig: Take 1 tablet (650 mg total) by mouth every 8 (eight) hours as needed for mild pain   sertraline (Zoloft) 50 mg tablet   No No   Sig: Take 1.5 tablets (75 mg total) by mouth daily   valACYclovir (VALTREX) 500 mg tablet   No No   Sig: Take 1 tablet (500 mg total) by mouth 2 (two) times a day      Facility-Administered Medications: None     Discharge Medication List as of 6/11/2025  7:00 PM        CONTINUE these medications which have NOT CHANGED    Details   acetaminophen (TYLENOL) 650 mg CR tablet Take 1 tablet (650 mg total) by mouth every 8 (eight) hours as needed for mild pain, Starting Mon 12/9/2024, Normal      Prenatal Vit-Fe Fumarate-FA (PRENATAL PO) Take by mouth, Historical Med      sertraline (Zoloft) 50 mg tablet Take 1.5 tablets (75 mg total) by mouth daily, Starting Wed 4/23/2025, Normal      valACYclovir (VALTREX) 500 mg tablet Take 1 tablet (500 mg total) by mouth 2 (two) times a day, Starting Mon 5/26/2025, Until Wed 6/25/2025, Normal      aspirin (ECOTRIN LOW STRENGTH) 81 mg EC tablet Take 2 tablets (162 mg total) by mouth daily Stop at 36 weeks.  Contact your OB or MFM with any side effects.  See https://www.preeclampsia.org/aspirin, Starting Thu 12/19/2024, Normal      ondansetron (ZOFRAN-ODT) 4 mg disintegrating tablet Take 1 tablet (4 mg total) by mouth every 8 (eight) hours as needed for nausea or vomiting, Starting Tue 2/4/2025, Until Fri 5/23/2025 at 2359, Normal           No discharge procedures on file.  ED SEPSIS DOCUMENTATION   Time reflects when  diagnosis was documented in both MDM as applicable and the Disposition within this note       Time User Action Codes Description Comment    6/11/2025  6:19 PM La Weeks Add [R11.10] Vomiting     6/11/2025  6:21 PM La Weeks Add [O47.9] Uterine contractions                    [1]   Past Medical History:  Diagnosis Date    Anemia     Chlamydia 2019    COVID-19 11/26/2022 2024    Depression     Gonorrhea     2019   tx'd    Migraine     Sickle cell trait (HCC)     Varicella     childhood chickenpox   [2]   Past Surgical History:  Procedure Laterality Date    NO PAST SURGERIES     [3]   Family History  Problem Relation Name Age of Onset    Mental illness Mother Trang     Depression Mother Trang     Gout Mother Trang     Sickle cell trait Mother Trang     Sickle cell trait Father Tony     Pulmonary embolism Father Tony     Hypertension Half-Brother Morgan     Panic disorder Half-Brother Morgan     Depression Half-Brother Christopher     Stroke Maternal Grandmother Sintia     Diabetes Maternal Grandmother Sintia     Alcohol abuse Maternal Grandfather      Cancer Paternal Grandmother Marquita     Cancer Paternal Grandfather Santos     Breast cancer Cousin Maternal Cousin     Schizophrenia Half-Brother Chavo Mazariegos,     Sickle cell trait Half-Brother Chavo Mazariegos,     Colon cancer Neg Hx      Ovarian cancer Neg Hx      Uterine cancer Neg Hx      Cervical cancer Neg Hx     [4]   Social History  Tobacco Use    Smoking status: Never    Smokeless tobacco: Never   Vaping Use    Vaping status: Never Used   Substance Use Topics    Alcohol use: Not Currently     Comment: nothing after 1st month of pregnancy    Drug use: Not Currently     Types: Marijuana     Comment: marijuana-last used 2023-FOB-marijuana; denies parent use- fob's uncle-heroin        La Weeks MD  07/01/25 2754

## 2025-06-11 NOTE — EMTALA/ACUTE CARE TRANSFER
[facility] EMERGENCY DEPARTMENT  [address]  [place] [address]  Dept: [phone]      EMTALA TRANSFER CONSENT    NAME Shyam HARDWICK 2000                              MRN [id number]    I have been informed of my rights regarding examination, treatment, and transfer   by Dr. La Weeks MD    Benefits: Specialized equipment and/or services available at the receiving facility (Include comment)________________________ (labor and delivery services, ob/gyn present)    Risks: Potential for delay in receiving treatment, Potential deterioration of medical condition, Loss of IV, Increased discomfort during transfer, Possible worsening of condition or death during transfer      Consent for Transfer:  I acknowledge that my medical condition has been evaluated and explained to me by the emergency department physician or other qualified medical person and/or my attending physician, who has recommended that I be transferred to the service of  Accepting Physician: Dr. Barlow at Accepting Facility Name, City & State : [facility]. The above potential benefits of such transfer, the potential risks associated with such transfer, and the probable risks of not being transferred have been explained to me, and I fully understand them.  The doctor has explained that, in my case, the benefits of transfer outweigh the risks.  I agree to be transferred.    I authorize the performance of emergency medical procedures and treatments upon me in both transit and upon arrival at the receiving facility.  Additionally, I authorize the release of any and all medical records to the receiving facility and request they be transported with me, if possible.  I understand that the safest mode of transportation during a medical emergency is an ambulance and that the Hospital advocates the use of this mode of transport. Risks of traveling to the receiving  facility by car, including absence of medical control, life sustaining equipment, such as oxygen, and medical personnel has been explained to me and I fully understand them.    (DOMINGUEZ CORRECT BOX BELOW)  [  ]  I consent to the stated transfer and to be transported by ambulance/helicopter.  [  ]  I consent to the stated transfer, but refuse transportation by ambulance and accept full responsibility for my transportation by car.  I understand the risks of non-ambulance transfers and I exonerate the Hospital and its staff from any deterioration in my condition that results from this refusal.    X___________________________________________    DATE  25  TIME________  Signature of patient or legally responsible individual signing on patient behalf           RELATIONSHIP TO PATIENT_________________________          Provider Certification    NAME Shyam Rizvi                                         2000                              MRN [id number]    A medical screening exam was performed on the above named patient.  Based on the examination:    Condition Necessitating Transfer The primary encounter diagnosis was Vomiting. A diagnosis of Uterine contractions was also pertinent to this visit.    Patient Condition: The patient has been stabilized such that within reasonable medical probability, no material deterioration of the patient condition or the condition of the unborn child(bereket) is likely to result from the transfer, Pregnant woman having contractions    Reason for Transfer: Level of Care needed not available at this facility (labor and delivery)    Transfer Requirements: Facility [facility]   Space available and qualified personnel available for treatment as acknowledged by PAC [phone]  Agreed to accept transfer and to provide appropriate medical treatment as acknowledged by       Dr. Barlow  Appropriate medical records of the examination and treatment of the patient are provided at the time of  transfer   STAFF INITIAL WHEN COMPLETED _______  Transfer will be performed by qualified personnel from [facility] or [facility]  and appropriate transfer equipment as required, including the use of necessary and appropriate life support measures.    Provider Certification: I have examined the patient and explained the following risks and benefits of being transferred/refusing transfer to the patient/family:  General risk, such as traffic hazards, adverse weather conditions, rough terrain or turbulence, possible failure of equipment (including vehicle or aircraft), or consequences of actions of persons outside the control of the transport personnel      Based on these reasonable risks and benefits to the patient and/or the unborn child(bereket), and based upon the information available at the time of the patient’s examination, I certify that the medical benefits reasonably to be expected from the provision of appropriate medical treatments at another medical facility outweigh the increasing risks, if any, to the individual’s medical condition, and in the case of labor to the unborn child, from effecting the transfer.    X____________________________________________ DATE 06/11/25        TIME_______      ORIGINAL - SEND TO MEDICAL RECORDS   COPY - SEND WITH PATIENT DURING TRANSFER

## 2025-06-11 NOTE — PROGRESS NOTES
Patient to clinic for iron infusion. Upon arrival patient was tearful stating she was dizzy and did not feel well. Asked patient if she would like to go to ER instead, patient stated she would go to ER. No medications given. Patient walked out to go to ER.Provider notified.

## 2025-06-11 NOTE — ED NOTES
TRANSFER INFORMATION    ED TR 30 pickup slets 1850. Going to AN L&D triage. Accepted by Dr Barlow. Report [phone]     Lizzy Forbes RN  06/11/25 4109

## 2025-06-12 PROBLEM — O42.90 LEAKAGE OF AMNIOTIC FLUID: Status: RESOLVED | Noted: 2025-06-11 | Resolved: 2025-06-12

## 2025-06-12 PROBLEM — Z3A.34 34 WEEKS GESTATION OF PREGNANCY: Status: RESOLVED | Noted: 2025-01-24 | Resolved: 2025-06-12

## 2025-06-12 PROBLEM — Z3A.37 37 WEEKS GESTATION OF PREGNANCY: Status: RESOLVED | Noted: 2025-06-11 | Resolved: 2025-06-12

## 2025-06-12 LAB — TREPONEMA PALLIDUM IGG+IGM AB [PRESENCE] IN SERUM OR PLASMA BY IMMUNOASSAY: NORMAL

## 2025-06-12 PROCEDURE — 99024 POSTOP FOLLOW-UP VISIT: CPT | Performed by: STUDENT IN AN ORGANIZED HEALTH CARE EDUCATION/TRAINING PROGRAM

## 2025-06-12 RX ADMIN — ACETAMINOPHEN 650 MG: 325 TABLET ORAL at 16:45

## 2025-06-12 RX ADMIN — DOCUSATE SODIUM 100 MG: 100 CAPSULE, LIQUID FILLED ORAL at 08:49

## 2025-06-12 RX ADMIN — IBUPROFEN 600 MG: 600 TABLET, FILM COATED ORAL at 04:49

## 2025-06-12 RX ADMIN — IBUPROFEN 600 MG: 600 TABLET, FILM COATED ORAL at 19:18

## 2025-06-12 RX ADMIN — VALACYCLOVIR HYDROCHLORIDE 500 MG: 500 TABLET, FILM COATED ORAL at 17:00

## 2025-06-12 RX ADMIN — IBUPROFEN 600 MG: 600 TABLET, FILM COATED ORAL at 12:24

## 2025-06-12 RX ADMIN — SERTRALINE 75 MG: 25 TABLET, FILM COATED ORAL at 08:48

## 2025-06-12 RX ADMIN — VALACYCLOVIR HYDROCHLORIDE 500 MG: 500 TABLET, FILM COATED ORAL at 08:49

## 2025-06-12 RX ADMIN — Medication 1 TABLET: at 08:49

## 2025-06-12 RX ADMIN — DOCUSATE SODIUM 100 MG: 100 CAPSULE, LIQUID FILLED ORAL at 17:00

## 2025-06-12 NOTE — ASSESSMENT & PLAN NOTE
Hgb / Hct       Results from last 7 days   Lab Units 06/11/25 2035 06/11/25  1738   HEMOGLOBIN g/dL 10.4* 10.1*   HEMATOCRIT % 32.7* 32.2*     On admission Hgb 10.4  QBL 25 ml

## 2025-06-12 NOTE — L&D DELIVERY NOTE
"Vaginal Delivery Summary - OB/GYN   Shyam Rizvi 24 y.o. female MRN: [id number]  Unit/Bed#: -01 Encounter: [id number]    Pre-delivery Diagnosis:   Pregnancy at 37.0wks   SROM    Post-delivery Diagnosis: same, delivered    Procedure: Spontaneous Vaginal Delivery     OBGYN Practice: [facility]    Attending Physician: Dr. Barlow  Resident Physician: Dr. Boyd    Anesthesia: None,     EBL: 50ml    Complications: none apparent    Specimens:   1. Arterial and venous cord gases  2. Cord blood  3. Segment of umbilical cord  4. Placenta to storage     Findings:  1. Viable male  on 2025 10:34 PM via Vaginal, Spontaneous . with APGAR scores of   and   at 1 and 5 minutes, respectively. Weight pending at time of dictation for skin to skin bonding.  2. Spontaneous delivery of intact placenta    Gases:  Umbilical Artery  No results for input(s): \"PHCART\", \"BECART\" in the last 72 hours.    Umbilical Vein  No results for input(s): \"PHCVEN\", \"BECVEN\" in the last 72 hours.        Brief history and labor course:  Shyam Rizvi is a 24 y.o. [id number] 37w0d . She presented to labor and delivery for spontaneous rupture of membranes. Her pregnancy was uncomplicated. On exam in triage she was noted to be 7/80-1. She was admitted for labor.     Description of delivery:    After a precipitous delivery with patient feeling pressure and immediate delivery of , Shyam delivered a viable male , wt pending as mother is doing skin to skin bonding. The fetal vertex delivered OA position spontaneously. There was no nuchal cord. The anterior shoulder delivered atraumatically with maternal expulsive forces and the assistance of gentle downward traction. The posterior shoulder delivered with maternal expulsive forces and the assistance of gentle upward traction. The remainder of the fetus delivered spontaneously.      Upon delivery, the infant was placed on Elyifan's abdomen and the cord was doubly " clamped and cut. Delayed cord clamping was achieved. The infant was noted to cry spontaneously and was moving all extremities appropriately. There was no evidence for injury. Awaiting nurse resuscitators evaluated the . Arterial and venous cord blood gases and cord blood was collected for analysis. These were promptly sent to the lab. In the immediate post-partum, active management of the 3rd stage of labor was performed with massage, the administration of 30 units of IV pitocin, and gentle traction on the umbilical cord. The placenta delivered spontaneously and was noted to have a centrally inserted 3 vessel cord.  The placenta was sent to storage.      The vagina, cervix, perineum, and rectum were inspected and there was noted to be no lacerations.    At the conclusion of the procedure, all needle, sponge, and instrument counts were noted to be correct.     Disposition:  The patient and the  both tolerated the procedure well and are recovering in labor and delivery room       Maria C Barlow DO  2025  10:47 PM

## 2025-06-12 NOTE — ASSESSMENT & PLAN NOTE
Admit to OBGYN for SROM, labor  SVE 7/90/-1 on admission  Clear liquid diet   F/u T&S, CBC, RPR   IVF LR 125cc/hr   Continuous fetal monitoring and tocometry   Analgesia at maternal request   Vertex by TAUS  Plan for expectnant management

## 2025-06-12 NOTE — OB LABOR/OXYTOCIN SAFETY PROGRESS
A/P: 56M with L hip IT fracture. Spent over 30 minutes discussing with the patient the risks and benefits of hip fracture fixation. Provided print outs of his Xrays and information from the [facility] on hip fractures. Explained that this injury will not heal properly without surgery, he will be bedbound for several months without surgery, and is at increased risk of infection, decompensation, and death. Also explained that he may require future more invasive and difficult surgery if this fracture is not treated in a timely manner. The patient expressed understanding of the risks and benefits and was adamant that he did not want to pursue surgery. His right knee abrasion was dressed with xeroform, Webril and an ACE wrap and he was instructed to remove this in 3-4 days and wash daily with gentle soap/water. For now will attempt to obtain more information from family members. Would recommend keeping the patient in the ED/Hospital until more collateral can be obtained, remaining NWB LLE, obtaining medical clearance for L hip IMN, and keeping the patient NPO.  Labor Progress Note - Shyam Rizvi 24 y.o. female MRN: [id number]    Unit/Bed#: -01 Encounter: [id number]       Contraction Frequency (minutes): irregular/irritability  Contraction Intensity: Mild/Moderate  Uterine Activity Characteristics: Irregular  Cervical Dilation: 7        Cervical Effacement: 90  Fetal Station: -1  Baseline Rate (FHR): 135 bpm  Fetal Heart Rate (FHT): 143 BPM  FHR Category: 1               Vital Signs:   Vitals:    06/11/25 2138   BP:    Pulse:    Resp:    Temp: 97.8 °F (36.6 °C)   SpO2:        Notes/comments:   Patient feeling pressure. SVE unchanged. Tracing cat 1. Plan to recheck in 1 hour, and if unchanged plan to start augmentation with pitocin. Dr. Barlow aware.       Kirill Modi MD 6/11/2025 9:44 PM   Assessment/Plan: 57 y/o M w PMH of paranoid schizophrenia (with multiple hospitalizations) presents with acute left hip fracture, s/p IMN 7/17. Psych consult for medication mgmt of schizophrenia.    Wound Consult requested to assist w/ management of right knee trauma wound s/p fall.     Right knee trauma wound  -+ thin yellow slough , moist firmly attach. No sharp debridement indicated   -Recommend autolytic debridement with Medihoney gel   -No associated cellulitis   -X rays of bilateral knee reports left proximal femur fracture (7/16)  -No bogginess or crepitus   -No signs of acute infection   -Topical Recommendations: Clean with NS. Pat dry. Apply liquid barrier film to periwound skin, apply Medihoney gel to wound bed, cover with silicone foam dressing. Change daily or PRN if soiled.   -Patient advised to f/u at wound care center, contact information provided.   -BLE elevation    Bilateral elbows healed skin abrasions   -Keep dry and clean   -Wash with soap and water. daily.     Risk for moisture associated dermatitis   -Intact skin in sacrum and buttocks.   -Clean with skin cleanser. Pat dry. Apply a thin layer of umm barrier cream, apply twice a day or PRN.     Additional Recommendations   Nutrition Consult for optimization as tolerated        consider MVI & Vit C to promote wound healing        encourage high quality protein when appropriate  Continue turning and positioning w/ offloading assistive devices as per protocol  Continue w/ Pericare as per protocol  Waffle Cushion to chair when oob to chair  Continue w/ low air loss fluidized bed surface     Care as per medicine, will follow w/ you    Upon discharge f/u as outpatient at [facility] [address] [place], [place] [phone]  Seen w/ attng MD parmar and D/w team (MD medical attending at the bedside during skin assessment).     Thank you for this consult  IRIS Patterson-BC, CWOCN (pager #[phone])    If after 4PM or before 7:30AM on Mon-Friday or weekend/holiday please contact general surgery for urgent matters.   Team A- [id number]   Team B- [id number]    We spent 70 minutes face to face with this patient of which more than 50% of the time was spent counseling & coordinating care of this pt

## 2025-06-12 NOTE — PLAN OF CARE
Problem: PAIN - ADULT  Goal: Verbalizes/displays adequate comfort level or baseline comfort level  Description: Interventions:  - Encourage patient to monitor pain and request assistance  - Assess pain using appropriate pain scale  - Administer analgesics as ordered based on type and severity of pain and evaluate response  - Implement non-pharmacological measures as appropriate and evaluate response  - Consider cultural and social influences on pain and pain management  - Notify physician/advanced practitioner if interventions unsuccessful or patient reports new pain  - Educate patient/family on pain management process including their role and importance of  reporting pain   - Provide non-pharmacologic/complimentary pain relief interventions  Outcome: Progressing     Problem: INFECTION - ADULT  Goal: Absence or prevention of progression during hospitalization  Description: INTERVENTIONS:  - Assess and monitor for signs and symptoms of infection  - Monitor lab/diagnostic results  - Monitor all insertion sites, i.e. indwelling lines, tubes, and drains  - Monitor endotracheal if appropriate and nasal secretions for changes in amount and color  - [place] appropriate cooling/warming therapies per order  - Administer medications as ordered  - Instruct and encourage patient and family to use good hand hygiene technique  - Identify and instruct in appropriate isolation precautions for identified infection/condition  Outcome: Progressing  Goal: Absence of fever/infection during neutropenic period  Description: INTERVENTIONS:  - Monitor WBC  - Perform strict hand hygiene  - Limit to healthy visitors only  - No plants, dried, fresh or silk flowers with donaldson in patient room  Outcome: Progressing     Problem: SAFETY ADULT  Goal: Patient will remain free of falls  Description: INTERVENTIONS:  - Educate patient/family on patient safety including physical limitations  - Instruct patient to call for assistance with activity   -  Consider consulting OT/PT to assist with strengthening/mobility based on AM PAC & JH-HLM score  - Consult OT/PT to assist with strengthening/mobility   - Keep Call bell within reach  - Keep bed low and locked with side rails adjusted as appropriate  - Keep care items and personal belongings within reach  - Initiate and maintain comfort rounds  - Make Fall Risk Sign visible to staff  - Offer Toileting every  Hours, in advance of need  - Initiate/Maintain alarm  - Obtain necessary fall risk management equipment:   - Apply yellow socks and bracelet for high fall risk patients  - Consider moving patient to room near nurses station  Outcome: Progressing  Goal: Maintain or return to baseline ADL function  Description: INTERVENTIONS:  -  Assess patient's ability to carry out ADLs; assess patient's baseline for ADL function and identify physical deficits which impact ability to perform ADLs (bathing, care of mouth/teeth, toileting, grooming, dressing, etc.)  - Assess/evaluate cause of self-care deficits   - Assess range of motion  - Assess patient's mobility; develop plan if impaired  - Assess patient's need for assistive devices and provide as appropriate  - Encourage maximum independence but intervene and supervise when necessary  - Involve family in performance of ADLs  - Assess for home care needs following discharge   - Consider OT consult to assist with ADL evaluation and planning for discharge  - Provide patient education as appropriate  - Monitor functional capacity and physical performance, use of AM PAC & JH-HLM   - Monitor gait, balance and fatigue with ambulation    Outcome: Progressing  Goal: Maintains/Returns to pre admission functional level  Description: INTERVENTIONS:  - Perform AM-PAC 6 Click Basic Mobility/ Daily Activity assessment daily.  - Set and communicate daily mobility goal to care team and patient/family/caregiver.   - Collaborate with rehabilitation services on mobility goals if consulted  -  Perform Range of Motion  times a day.  - Reposition patient every  hours.  - Dangle patient  times a day  - Stand patient  times a day  - Ambulate patient  times a day  - Out of bed to chair  times a day   - Out of bed for meals times a day  - Out of bed for toileting  - Record patient progress and toleration of activity level   Outcome: Progressing     Problem: DISCHARGE PLANNING  Goal: Discharge to home or other facility with appropriate resources  Description: INTERVENTIONS:  - Identify barriers to discharge w/patient and caregiver  - Arrange for needed discharge resources and transportation as appropriate  - Identify discharge learning needs (meds, wound care, etc.)  - Arrange for interpretive services to assist at discharge as needed  - Refer to Case Management Department for coordinating discharge planning if the patient needs post-hospital services based on physician/advanced practitioner order or complex needs related to functional status, cognitive ability, or social support system  Outcome: Progressing     Problem: Knowledge Deficit  Goal: Patient/family/caregiver demonstrates understanding of disease process, treatment plan, medications, and discharge instructions  Description: Complete learning assessment and assess knowledge base.  Interventions:  - Provide teaching at level of understanding  - Provide teaching via preferred learning methods  Outcome: Progressing

## 2025-06-12 NOTE — PLAN OF CARE
Problem: PAIN - ADULT  Goal: Verbalizes/displays adequate comfort level or baseline comfort level  Description: Interventions:  - Encourage patient to monitor pain and request assistance  - Assess pain using appropriate pain scale  - Administer analgesics as ordered based on type and severity of pain and evaluate response  - Implement non-pharmacological measures as appropriate and evaluate response  - Consider cultural and social influences on pain and pain management  - Notify physician/advanced practitioner if interventions unsuccessful or patient reports new pain  - Educate patient/family on pain management process including their role and importance of  reporting pain   - Provide non-pharmacologic/complimentary pain relief interventions  Outcome: Progressing     Problem: INFECTION - ADULT  Goal: Absence or prevention of progression during hospitalization  Description: INTERVENTIONS:  - Assess and monitor for signs and symptoms of infection  - Monitor lab/diagnostic results  - Monitor all insertion sites, i.e. indwelling lines, tubes, and drains  - Monitor endotracheal if appropriate and nasal secretions for changes in amount and color  - [place] appropriate cooling/warming therapies per order  - Administer medications as ordered  - Instruct and encourage patient and family to use good hand hygiene technique  - Identify and instruct in appropriate isolation precautions for identified infection/condition  Outcome: Progressing  Goal: Absence of fever/infection during neutropenic period  Description: INTERVENTIONS:  - Monitor WBC  - Perform strict hand hygiene  - Limit to healthy visitors only  - No plants, dried, fresh or silk flowers with donaldson in patient room  Outcome: Progressing     Problem: SAFETY ADULT  Goal: Patient will remain free of falls  Description: INTERVENTIONS:  - Educate patient/family on patient safety including physical limitations  - Instruct patient to call for assistance with activity   -  Consider consulting OT/PT to assist with strengthening/mobility based on AM PAC & JH-HLM score  - Consult OT/PT to assist with strengthening/mobility   - Keep Call bell within reach  - Keep bed low and locked with side rails adjusted as appropriate  - Keep care items and personal belongings within reach  - Initiate and maintain comfort rounds  - Make Fall Risk Sign visible to staff  - Offer Toileting every  Hours, in advance of need  - Initiate/Maintain alarm  - Obtain necessary fall risk management equipment:   - Apply yellow socks and bracelet for high fall risk patients  - Consider moving patient to room near nurses station  Outcome: Progressing  Goal: Maintain or return to baseline ADL function  Description: INTERVENTIONS:  -  Assess patient's ability to carry out ADLs; assess patient's baseline for ADL function and identify physical deficits which impact ability to perform ADLs (bathing, care of mouth/teeth, toileting, grooming, dressing, etc.)  - Assess/evaluate cause of self-care deficits   - Assess range of motion  - Assess patient's mobility; develop plan if impaired  - Assess patient's need for assistive devices and provide as appropriate  - Encourage maximum independence but intervene and supervise when necessary  - Involve family in performance of ADLs  - Assess for home care needs following discharge   - Consider OT consult to assist with ADL evaluation and planning for discharge  - Provide patient education as appropriate  - Monitor functional capacity and physical performance, use of AM PAC & JH-HLM   - Monitor gait, balance and fatigue with ambulation    Outcome: Progressing  Goal: Maintains/Returns to pre admission functional level  Description: INTERVENTIONS:  - Perform AM-PAC 6 Click Basic Mobility/ Daily Activity assessment daily.  - Set and communicate daily mobility goal to care team and patient/family/caregiver.   - Collaborate with rehabilitation services on mobility goals if consulted  -  Perform Range of Motion  times a day.  - Reposition patient every hours.  - Dangle patient  times a day  - Stand patient  times a day  - Ambulate patient  times a day  - Out of bed to chair times a day   - Out of bed for meals  times a day  - Out of bed for toileting  - Record patient progress and toleration of activity level   Outcome: Progressing     Problem: DISCHARGE PLANNING  Goal: Discharge to home or other facility with appropriate resources  Description: INTERVENTIONS:  - Identify barriers to discharge w/patient and caregiver  - Arrange for needed discharge resources and transportation as appropriate  - Identify discharge learning needs (meds, wound care, etc.)  - Arrange for interpretive services to assist at discharge as needed  - Refer to Case Management Department for coordinating discharge planning if the patient needs post-hospital services based on physician/advanced practitioner order or complex needs related to functional status, cognitive ability, or social support system  Outcome: Progressing     Problem: Knowledge Deficit  Goal: Patient/family/caregiver demonstrates understanding of disease process, treatment plan, medications, and discharge instructions  Description: Complete learning assessment and assess knowledge base.  Interventions:  - Provide teaching at level of understanding  - Provide teaching via preferred learning methods  Outcome: Progressing     Problem: BIRTH - VAGINAL/ SECTION  Goal: Fetal and maternal status remain reassuring during the birth process  Description: INTERVENTIONS:  - Monitor vital signs  - Monitor fetal heart rate  - Monitor uterine activity  - Monitor labor progression (vaginal delivery)  - DVT prophylaxis  - Antibiotic prophylaxis  Outcome: Progressing  Goal: Emotionally satisfying birthing experience for mother/fetus  Description: Interventions:  - Assess, plan, implement and evaluate the nursing care given to the patient in labor  - Advocate the  philosophy that each childbirth experience is a unique experience and support the family's chosen level of involvement and control during the labor process   - Actively participate in both the patient's and family's teaching of the birth process  - Consider cultural, Sikhism and age-specific factors and plan care for the patient in labor  Outcome: Progressing

## 2025-06-12 NOTE — PROGRESS NOTES
"Progress Note - OB/GYN   Name: Shyam Rizvi 24 y.o. female I MRN: [id number]  Unit/Bed#: -01 I Date of Admission: 2025   Date of Service: 2025 I Hospital Day: 1     Assessment & Plan   (spontaneous vaginal delivery)  Continue routine post partum care  Encourage ambulation  Encourage breastfeeding  Contraception: Undecided  Anticipate discharge PPD 1 vs 2   Sickle cell trait in mother affecting pregnancy ([facility])   Patient declined CVS, amniocentesis, expanded carrier screening, and [place] single gene NIPT; patient's partner declined hemoglobinopathy screening.   Protein S deficiency affecting pregnancy ([facility])  Protein S level low with PNL- 44  Recheck in postpartum outpatient  History of herpes genitalis  Negative spec exam on admission, on valtrex suppression  Severe major depressive disorder ([facility])  Home Zoloft 50mg  Iron deficiency anemia during pregnancy  Hgb / Hct       Results from last 7 days   Lab Units 25  1738   HEMOGLOBIN g/dL 10.4* 10.1*   HEMATOCRIT % 32.7* 32.2*     On admission Hgb 10.4  QBL 25 ml        Assessment:  Post partum Day #1 s/p , stable, baby in the room.    Subjective/Objective   Chief Complaint:     Post delivery. Patient is doing well. Lochia WNL. Pain well controlled.     Subjective:     Pain: yes, cramping, improved with meds  Tolerating PO: yes  Voiding: yes  Flatus: yes  Ambulating: yes  Chest pain: no  Shortness of breath: no  Leg pain: no  Lochia: minimal    Objective:     Vitals: /70 (BP Location: Left arm)   Pulse 81   Temp 97.9 °F (36.6 °C) (Oral)   Resp 18   Ht 5' 5\" (1.651 m)   Wt 81.2 kg (179 lb)   LMP  (LMP Unknown)   SpO2 99%   Breastfeeding Yes   BMI 29.79 kg/m²     I/O         06/10 07 07 07    Urine (mL/kg/hr)  650    Blood  25    Total Output  675    Net  -675                  Lab Results   Component Value Date    WBC 8.60 2025    HGB 10.4 (L) 2025    HCT 32.7 (L) " 06/11/2025    MCV 84 06/11/2025     06/11/2025       Physical Exam:     Gen: AAOx3, NAD  CV: no acute distress  Lungs: no acute distress  Abd: Soft, non-tender, non-distended, no rebound or guarding  Uterine fundus firm and non-tender, 2 cm below the umbilicus  Ext: Non tender    Shawanda Coker MD  OB GYN PGY1  06/12/25  5:56 AM

## 2025-06-12 NOTE — OB LABOR/OXYTOCIN SAFETY PROGRESS
Labor Progress Note - Shyam Rizvi 24 y.o. female MRN: [id number]    Unit/Bed#: -01 Encounter: [id number]       Contraction Frequency (minutes): irregular/irritability  Contraction Intensity: Mild/Moderate  Uterine Activity Characteristics: Irregular  Cervical Dilation: 8        Cervical Effacement: 90  Fetal Station: 0  Baseline Rate (FHR): 135 bpm  Fetal Heart Rate (FHT): 140 BPM  FHR Category: 1               Vital Signs:   Vitals:    06/11/25 2138   BP:    Pulse:    Resp:    Temp: 97.8 °F (36.6 °C)   SpO2:        Notes/comments:   SVE as noted above. Baby in LOT position confirmed by U/S. Tracing cat 1. Plan to recheck in 1 hour. If unchanged, plan to start augmentation with pitocin. Dr. Barlow made aware.       Kirill Modi MD 6/11/2025 10:25 PM

## 2025-06-12 NOTE — ASSESSMENT & PLAN NOTE
12/12 Patient declined CVS, amniocentesis, expanded carrier screening, and [place] single gene NIPT; patient's partner declined hemoglobinopathy screening.

## 2025-06-12 NOTE — PLAN OF CARE
Problem: PAIN - ADULT  Goal: Verbalizes/displays adequate comfort level or baseline comfort level  Description: Interventions:  - Encourage patient to monitor pain and request assistance  - Assess pain using appropriate pain scale  - Administer analgesics as ordered based on type and severity of pain and evaluate response  - Implement non-pharmacological measures as appropriate and evaluate response  - Consider cultural and social influences on pain and pain management  - Notify physician/advanced practitioner if interventions unsuccessful or patient reports new pain  - Educate patient/family on pain management process including their role and importance of  reporting pain   - Provide non-pharmacologic/complimentary pain relief interventions  6/11/2025 2325 by Kim Gutierrez RN  Outcome: Progressing  6/11/2025 2049 by Kim Gutierrez RN  Outcome: Progressing     Problem: INFECTION - ADULT  Goal: Absence or prevention of progression during hospitalization  Description: INTERVENTIONS:  - Assess and monitor for signs and symptoms of infection  - Monitor lab/diagnostic results  - Monitor all insertion sites, i.e. indwelling lines, tubes, and drains  - Monitor endotracheal if appropriate and nasal secretions for changes in amount and color  - [place] appropriate cooling/warming therapies per order  - Administer medications as ordered  - Instruct and encourage patient and family to use good hand hygiene technique  - Identify and instruct in appropriate isolation precautions for identified infection/condition  6/11/2025 2325 by Kim Gutierrez RN  Outcome: Progressing  6/11/2025 2049 by Kim Gutierrez RN  Outcome: Progressing  Goal: Absence of fever/infection during neutropenic period  Description: INTERVENTIONS:  - Monitor WBC  - Perform strict hand hygiene  - Limit to healthy visitors only  - No plants, dried, fresh or silk flowers with donaldson in patient room  6/11/2025 2325 by Kim Gutierrez RN  Outcome:  Progressing  6/11/2025 2049 by Kim Gutierrez RN  Outcome: Progressing     Problem: SAFETY ADULT  Goal: Patient will remain free of falls  Description: INTERVENTIONS:  - Educate patient/family on patient safety including physical limitations  - Instruct patient to call for assistance with activity   - Consider consulting OT/PT to assist with strengthening/mobility based on AM PAC & JH-HLM score  - Consult OT/PT to assist with strengthening/mobility   - Keep Call bell within reach  - Keep bed low and locked with side rails adjusted as appropriate  - Keep care items and personal belongings within reach  - Initiate and maintain comfort rounds  - Make Fall Risk Sign visible to staff  - Offer Toileting every  Hours, in advance of need  - Initiate/Maintain alarm  - Obtain necessary fall risk management equipment:   - Apply yellow socks and bracelet for high fall risk patients  - Consider moving patient to room near nurses station  6/11/2025 2325 by Kim Gutierrez RN  Outcome: Progressing  6/11/2025 2049 by Kim Gutierrez RN  Outcome: Progressing  Goal: Maintain or return to baseline ADL function  Description: INTERVENTIONS:  -  Assess patient's ability to carry out ADLs; assess patient's baseline for ADL function and identify physical deficits which impact ability to perform ADLs (bathing, care of mouth/teeth, toileting, grooming, dressing, etc.)  - Assess/evaluate cause of self-care deficits   - Assess range of motion  - Assess patient's mobility; develop plan if impaired  - Assess patient's need for assistive devices and provide as appropriate  - Encourage maximum independence but intervene and supervise when necessary  - Involve family in performance of ADLs  - Assess for home care needs following discharge   - Consider OT consult to assist with ADL evaluation and planning for discharge  - Provide patient education as appropriate  - Monitor functional capacity and physical performance, use of AM PAC & JH-HLM    - Monitor gait, balance and fatigue with ambulation    6/11/2025 2325 by Kim Gutierrez RN  Outcome: Progressing  6/11/2025 2049 by Kim Gutierrez RN  Outcome: Progressing  Goal: Maintains/Returns to pre admission functional level  Description: INTERVENTIONS:  - Perform AM-PAC 6 Click Basic Mobility/ Daily Activity assessment daily.  - Set and communicate daily mobility goal to care team and patient/family/caregiver.   - Collaborate with rehabilitation services on mobility goals if consulted  - Perform Range of Motion  times a day.  - Reposition patient every  hours.  - Dangle patient  times a day  - Stand patient  times a day  - Ambulate patient  times a day  - Out of bed to chair  times a day   - Out of bed for meals times a day  - Out of bed for toileting  - Record patient progress and toleration of activity level   6/11/2025 2325 by Kim Gutierrez RN  Outcome: Progressing  6/11/2025 2049 by Kim Gutierrez RN  Outcome: Progressing     Problem: DISCHARGE PLANNING  Goal: Discharge to home or other facility with appropriate resources  Description: INTERVENTIONS:  - Identify barriers to discharge w/patient and caregiver  - Arrange for needed discharge resources and transportation as appropriate  - Identify discharge learning needs (meds, wound care, etc.)  - Arrange for interpretive services to assist at discharge as needed  - Refer to Case Management Department for coordinating discharge planning if the patient needs post-hospital services based on physician/advanced practitioner order or complex needs related to functional status, cognitive ability, or social support system  6/11/2025 2325 by Kim Gutierrez RN  Outcome: Progressing  6/11/2025 2049 by Kim Gutierrez RN  Outcome: Progressing     Problem: Knowledge Deficit  Goal: Patient/family/caregiver demonstrates understanding of disease process, treatment plan, medications, and discharge instructions  Description: Complete learning assessment  and assess knowledge base.  Interventions:  - Provide teaching at level of understanding  - Provide teaching via preferred learning methods  2025 by Kim Gutierrez RN  Outcome: Progressing  2025 by Kim Gutierrez RN  Outcome: Progressing     Problem: BIRTH - VAGINAL/ SECTION  Goal: Fetal and maternal status remain reassuring during the birth process  Description: INTERVENTIONS:  - Monitor vital signs  - Monitor fetal heart rate  - Monitor uterine activity  - Monitor labor progression (vaginal delivery)  - DVT prophylaxis  - Antibiotic prophylaxis  2025 by Kim Gutierrez RN  Outcome: Completed  2025 by Kim Gutierrez RN  Outcome: Progressing  Goal: Emotionally satisfying birthing experience for mother/fetus  Description: Interventions:  - Assess, plan, implement and evaluate the nursing care given to the patient in labor  - Advocate the philosophy that each childbirth experience is a unique experience and support the family's chosen level of involvement and control during the labor process   - Actively participate in both the patient's and family's teaching of the birth process  - Consider cultural, Taoism and age-specific factors and plan care for the patient in labor  2025 by Kim Gutierrez RN  Outcome: Completed  2025 by Kim Gutierrez RN  Outcome: Progressing

## 2025-06-12 NOTE — PLAN OF CARE
Problem: PAIN - ADULT  Goal: Verbalizes/displays adequate comfort level or baseline comfort level  Description: Interventions:  - Encourage patient to monitor pain and request assistance  - Assess pain using appropriate pain scale  - Administer analgesics as ordered based on type and severity of pain and evaluate response  - Implement non-pharmacological measures as appropriate and evaluate response  - Consider cultural and social influences on pain and pain management  - Notify physician/advanced practitioner if interventions unsuccessful or patient reports new pain  - Educate patient/family on pain management process including their role and importance of  reporting pain   - Provide non-pharmacologic/complimentary pain relief interventions  Outcome: Progressing     Problem: INFECTION - ADULT  Goal: Absence or prevention of progression during hospitalization  Description: INTERVENTIONS:  - Assess and monitor for signs and symptoms of infection  - Monitor lab/diagnostic results  - Monitor all insertion sites, i.e. indwelling lines, tubes, and drains  - Monitor endotracheal if appropriate and nasal secretions for changes in amount and color  - [place] appropriate cooling/warming therapies per order  - Administer medications as ordered  - Instruct and encourage patient and family to use good hand hygiene technique  - Identify and instruct in appropriate isolation precautions for identified infection/condition  Outcome: Progressing  Goal: Absence of fever/infection during neutropenic period  Description: INTERVENTIONS:  - Monitor WBC  - Perform strict hand hygiene  - Limit to healthy visitors only  - No plants, dried, fresh or silk flowers with donaldson in patient room  Outcome: Progressing     Problem: SAFETY ADULT  Goal: Patient will remain free of falls  Description: INTERVENTIONS:  - Educate patient/family on patient safety including physical limitations  - Instruct patient to call for assistance with activity   -  Consider consulting OT/PT to assist with strengthening/mobility based on AM PAC & JH-HLM score  - Consult OT/PT to assist with strengthening/mobility   - Keep Call bell within reach  - Keep bed low and locked with side rails adjusted as appropriate  - Keep care items and personal belongings within reach  - Initiate and maintain comfort rounds  - Make Fall Risk Sign visible to staff  - Offer Toileting every  Hours, in advance of need  - Initiate/Maintain alarm  - Obtain necessary fall risk management equipment:   - Apply yellow socks and bracelet for high fall risk patients  - Consider moving patient to room near nurses station  Outcome: Progressing  Goal: Maintain or return to baseline ADL function  Description: INTERVENTIONS:  -  Assess patient's ability to carry out ADLs; assess patient's baseline for ADL function and identify physical deficits which impact ability to perform ADLs (bathing, care of mouth/teeth, toileting, grooming, dressing, etc.)  - Assess/evaluate cause of self-care deficits   - Assess range of motion  - Assess patient's mobility; develop plan if impaired  - Assess patient's need for assistive devices and provide as appropriate  - Encourage maximum independence but intervene and supervise when necessary  - Involve family in performance of ADLs  - Assess for home care needs following discharge   - Consider OT consult to assist with ADL evaluation and planning for discharge  - Provide patient education as appropriate  - Monitor functional capacity and physical performance, use of AM PAC & JH-HLM   - Monitor gait, balance and fatigue with ambulation    Outcome: Progressing  Goal: Maintains/Returns to pre admission functional level  Description: INTERVENTIONS:  - Perform AM-PAC 6 Click Basic Mobility/ Daily Activity assessment daily.  - Set and communicate daily mobility goal to care team and patient/family/caregiver.   - Collaborate with rehabilitation services on mobility goals if consulted  -  Perform Range of Motion  times a day.  - Reposition patient every  hours.  - Dangle patient  times a day  - Stand patient  times a day  - Ambulate patient  times a day  - Out of bed to chair  times a day   - Out of bed for meals times a day  - Out of bed for toileting  - Record patient progress and toleration of activity level   Outcome: Progressing     Problem: DISCHARGE PLANNING  Goal: Discharge to home or other facility with appropriate resources  Description: INTERVENTIONS:  - Identify barriers to discharge w/patient and caregiver  - Arrange for needed discharge resources and transportation as appropriate  - Identify discharge learning needs (meds, wound care, etc.)  - Arrange for interpretive services to assist at discharge as needed  - Refer to Case Management Department for coordinating discharge planning if the patient needs post-hospital services based on physician/advanced practitioner order or complex needs related to functional status, cognitive ability, or social support system  Outcome: Progressing     Problem: Knowledge Deficit  Goal: Patient/family/caregiver demonstrates understanding of disease process, treatment plan, medications, and discharge instructions  Description: Complete learning assessment and assess knowledge base.  Interventions:  - Provide teaching at level of understanding  - Provide teaching via preferred learning methods  Outcome: Progressing     Problem: BIRTH - VAGINAL/ SECTION  Goal: Fetal and maternal status remain reassuring during the birth process  Description: INTERVENTIONS:  - Monitor vital signs  - Monitor fetal heart rate  - Monitor uterine activity  - Monitor labor progression (vaginal delivery)  - DVT prophylaxis  - Antibiotic prophylaxis  Outcome: Progressing  Goal: Emotionally satisfying birthing experience for mother/fetus  Description: Interventions:  - Assess, plan, implement and evaluate the nursing care given to the patient in labor  - Advocate the  philosophy that each childbirth experience is a unique experience and support the family's chosen level of involvement and control during the labor process   - Actively participate in both the patient's and family's teaching of the birth process  - Consider cultural, [employer] and age-specific factors and plan care for the patient in labor  Outcome: Progressing

## 2025-06-13 VITALS
WEIGHT: 179 LBS | SYSTOLIC BLOOD PRESSURE: 110 MMHG | OXYGEN SATURATION: 98 % | BODY MASS INDEX: 29.82 KG/M2 | HEART RATE: 66 BPM | TEMPERATURE: 98 F | DIASTOLIC BLOOD PRESSURE: 66 MMHG | HEIGHT: 65 IN | RESPIRATION RATE: 18 BRPM

## 2025-06-13 LAB — GP B STREP DNA SPEC QL NAA+PROBE: NEGATIVE

## 2025-06-13 PROCEDURE — NC001 PR NO CHARGE: Performed by: OBSTETRICS & GYNECOLOGY

## 2025-06-13 PROCEDURE — 99024 POSTOP FOLLOW-UP VISIT: CPT | Performed by: OBSTETRICS & GYNECOLOGY

## 2025-06-13 RX ORDER — DOCUSATE SODIUM 100 MG/1
100 CAPSULE, LIQUID FILLED ORAL 2 TIMES DAILY
Start: 2025-06-13

## 2025-06-13 RX ORDER — CALCIUM CARBONATE 500 MG/1
1000 TABLET, CHEWABLE ORAL DAILY PRN
Start: 2025-06-13

## 2025-06-13 RX ORDER — IBUPROFEN 600 MG/1
600 TABLET, FILM COATED ORAL EVERY 6 HOURS
Start: 2025-06-13

## 2025-06-13 RX ORDER — BENZOCAINE/MENTHOL 6 MG-10 MG
1 LOZENGE MUCOUS MEMBRANE DAILY PRN
Start: 2025-06-13

## 2025-06-13 RX ADMIN — IBUPROFEN 600 MG: 600 TABLET, FILM COATED ORAL at 02:13

## 2025-06-13 RX ADMIN — IBUPROFEN 600 MG: 600 TABLET, FILM COATED ORAL at 08:54

## 2025-06-13 RX ADMIN — Medication 1 TABLET: at 08:54

## 2025-06-13 RX ADMIN — SERTRALINE 75 MG: 25 TABLET, FILM COATED ORAL at 08:54

## 2025-06-13 RX ADMIN — DOCUSATE SODIUM 100 MG: 100 CAPSULE, LIQUID FILLED ORAL at 08:54

## 2025-06-13 RX ADMIN — ACETAMINOPHEN 650 MG: 325 TABLET ORAL at 00:24

## 2025-06-13 NOTE — ASSESSMENT & PLAN NOTE
Continue routine post partum care  Encourage ambulation  Encourage breastfeeding  Contraception: Undecided  Anticipate discharge PPD  2

## 2025-06-13 NOTE — ASSESSMENT & PLAN NOTE
12/12 Patient declined CVS, amniocentesis, expanded carrier screening, and [place] single gene NIPT; patient's partner declined hemoglobinopathy screening.

## 2025-06-13 NOTE — PLAN OF CARE
Problem: PAIN - ADULT  Goal: Verbalizes/displays adequate comfort level or baseline comfort level  Description: Interventions:  - Encourage patient to monitor pain and request assistance  - Assess pain using appropriate pain scale  - Administer analgesics as ordered based on type and severity of pain and evaluate response  - Implement non-pharmacological measures as appropriate and evaluate response  - Consider cultural and social influences on pain and pain management  - Notify physician/advanced practitioner if interventions unsuccessful or patient reports new pain  - Educate patient/family on pain management process including their role and importance of  reporting pain   - Provide non-pharmacologic/complimentary pain relief interventions  Outcome: Progressing     Problem: INFECTION - ADULT  Goal: Absence or prevention of progression during hospitalization  Description: INTERVENTIONS:  - Assess and monitor for signs and symptoms of infection  - Monitor lab/diagnostic results  - Monitor all insertion sites, i.e. indwelling lines, tubes, and drains  - Monitor endotracheal if appropriate and nasal secretions for changes in amount and color  - [place] appropriate cooling/warming therapies per order  - Administer medications as ordered  - Instruct and encourage patient and family to use good hand hygiene technique  - Identify and instruct in appropriate isolation precautions for identified infection/condition  Outcome: Progressing  Goal: Absence of fever/infection during neutropenic period  Description: INTERVENTIONS:  - Monitor WBC  - Perform strict hand hygiene  - Limit to healthy visitors only  - No plants, dried, fresh or silk flowers with donaldson in patient room  Outcome: Progressing     Problem: SAFETY ADULT  Goal: Patient will remain free of falls  Description: INTERVENTIONS:  - Educate patient/family on patient safety including physical limitations  - Instruct patient to call for assistance with activity   -  Consider consulting OT/PT to assist with strengthening/mobility based on AM PAC & JH-HLM score  - Consult OT/PT to assist with strengthening/mobility   - Keep Call bell within reach  - Keep bed low and locked with side rails adjusted as appropriate  - Keep care items and personal belongings within reach  - Initiate and maintain comfort rounds  - Apply yellow socks and bracelet for high fall risk patients  - Consider moving patient to room near nurses station  Outcome: Progressing  Goal: Maintain or return to baseline ADL function  Description: INTERVENTIONS:  -  Assess patient's ability to carry out ADLs; assess patient's baseline for ADL function and identify physical deficits which impact ability to perform ADLs (bathing, care of mouth/teeth, toileting, grooming, dressing, etc.)  - Assess/evaluate cause of self-care deficits   - Assess range of motion  - Assess patient's mobility; develop plan if impaired  - Assess patient's need for assistive devices and provide as appropriate  - Encourage maximum independence but intervene and supervise when necessary  - Involve family in performance of ADLs  - Assess for home care needs following discharge   - Consider OT consult to assist with ADL evaluation and planning for discharge  - Provide patient education as appropriate  - Monitor functional capacity and physical performance, use of AM PAC & JH-HLM   - Monitor gait, balance and fatigue with ambulation    Outcome: Progressing  Goal: Maintains/Returns to pre admission functional level  Description: INTERVENTIONS:  - Perform AM-PAC 6 Click Basic Mobility/ Daily Activity assessment daily.  - Set and communicate daily mobility goal to care team and patient/family/caregiver.   - Collaborate with rehabilitation services on mobility goals if consulted  - Out of bed for toileting  - Record patient progress and toleration of activity level   Outcome: Progressing     Problem: DISCHARGE PLANNING  Goal: Discharge to home or other  facility with appropriate resources  Description: INTERVENTIONS:  - Identify barriers to discharge w/patient and caregiver  - Arrange for needed discharge resources and transportation as appropriate  - Identify discharge learning needs (meds, wound care, etc.)  - Arrange for interpretive services to assist at discharge as needed  - Refer to Case Management Department for coordinating discharge planning if the patient needs post-hospital services based on physician/advanced practitioner order or complex needs related to functional status, cognitive ability, or social support system  Outcome: Progressing     Problem: Knowledge Deficit  Goal: Patient/family/caregiver demonstrates understanding of disease process, treatment plan, medications, and discharge instructions  Description: Complete learning assessment and assess knowledge base.  Interventions:  - Provide teaching at level of understanding  - Provide teaching via preferred learning methods  Outcome: Progressing

## 2025-06-13 NOTE — PROGRESS NOTES
"Progress Note - OB/GYN   Name: Shyam Rizvi 24 y.o. female I MRN: [id number]  Unit/Bed#: -01 I Date of Admission: 2025   Date of Service: 2025 I Hospital Day: 2     Assessment & Plan   (spontaneous vaginal delivery)  Continue routine post partum care  Encourage ambulation  Encourage breastfeeding  Contraception: Undecided  Anticipate discharge PPD  2   Sickle cell trait in mother affecting pregnancy ([facility])   Patient declined CVS, amniocentesis, expanded carrier screening, and [place] single gene NIPT; patient's partner declined hemoglobinopathy screening.   Protein S deficiency affecting pregnancy ([facility])  Protein S level low with PNL- 44  Recheck in postpartum outpatient  History of herpes genitalis  Negative spec exam on admission, on valtrex suppression  Severe major depressive disorder ([facility])  Home Zoloft 50mg  Iron deficiency anemia during pregnancy  Hgb / Hct       Results from last 7 days   Lab Units 25  1738   HEMOGLOBIN g/dL 10.4* 10.1*   HEMATOCRIT % 32.7* 32.2*     On admission Hgb 10.4  QBL 25 ml        Assessment:  Post partum Day #2 s/p , stable, baby in the room    Subjective/Objective   Chief Complaint:     Post delivery. Patient is doing well. Lochia WNL. Pain well controlled.     Subjective:     Pain: yes, cramping, improved with meds  Tolerating PO: yes  Voiding: yes  Flatus: yes  Ambulating: yes  Chest pain: no  Shortness of breath: no  Leg pain: no  Lochia: minimal    Objective:     Vitals: /70 (BP Location: Left arm)   Pulse 63   Temp 98 °F (36.7 °C) (Oral)   Resp 18   Ht 5' 5\" (1.651 m)   Wt 81.2 kg (179 lb)   LMP  (LMP Unknown)   SpO2 98%   Breastfeeding Yes   BMI 29.79 kg/m²     I/O          07 07 07    Urine (mL/kg/hr) 650 800 (0.4)    Blood 25     Total Output 675 800    Net -675 -800                  Lab Results   Component Value Date    WBC 8.60 2025    HGB 10.4 (L) 2025    HCT 32.7 " (L) 06/11/2025    MCV 84 06/11/2025     06/11/2025       Physical Exam:     Gen: AAOx3, NAD  CV: no acute distress  Lungs: no acute distress  Abd: Soft, non-tender, non-distended, no rebound or guarding  Uterine fundus firm and non-tender, 2 cm below the umbilicus.  Ext: Non tender    Shawanda Coker MD  OB GYN PGY1  06/13/25  5:49 AM

## 2025-06-13 NOTE — DISCHARGE INSTR - AVS FIRST PAGE
Vaginal Delivery   WHAT YOU SHOULD KNOW:   A vaginal delivery is the birth of your baby through your vagina (birth canal).        AFTER YOU LEAVE:   Medicines:  NSAIDs  help decrease swelling and pain or fever. This medicine is available with or without a doctor's order. NSAIDs can cause stomach bleeding or kidney problems in certain people. If you take blood thinner medicine, always ask your healthcare provider if NSAIDs are safe for you. Always read the medicine label and follow directions.    Take your medicine as directed.  Call your healthcare provider if you think your medicine is not helping or if you have side effects. Tell him if you are allergic to any medicine. Keep a list of the medicines, vitamins, and herbs you take. Include the amounts, and when and why you take them. Bring the list or the pill bottles to follow-up visits. Carry your medicine list with you in case of an emergency.  Follow up with your primary healthcare provider:  Most women need to return 6 weeks after a vaginal delivery. Ask about how to care for your wounds or stitches. Write down your questions so you remember to ask them during your visits.  Activity:  Rest as much as possible. Try to keep all activities short. You may be able to do some exercise soon after you have your baby. Talk with your primary healthcare provider before you start exercising. If you work outside the home, ask when you can return to your job.  Kegel exercises:  Kegel exercises may help your vaginal and rectal muscles heal faster. You can do Kegel exercises by tightening and relaxing the muscles around your vagina. Kegel exercises help make the muscles stronger.   Breast care:  When your milk comes in, your breasts may feel full and hard. Ask how to care for your breasts, even if you are not breastfeeding.   Constipation:  Do not try to push the bowel movement out if it is too hard. High-fiber foods, extra liquids, and regular exercise can help you prevent  constipation. Examples of high-fiber foods are fruit and bran. Prune juice and water are good liquids to drink. Regular exercise helps your digestive system work. You may also be told to take over-the-counter fiber and stool softener medicines. Take these items as directed.   Hemorrhoids:  Pregnancy can cause severe hemorrhoids. You may have rectal pain because of the hemorrhoids. Ask how to prevent or treat hemorrhoids.  Perineum care:  Your perineum is the area between your vagina and anus. Keep the area clean and dry to help it heal and to prevent infection. Wash the area gently with soap and water when you bathe or shower. Rinse your perineum with warm water when you use the toilet. Your primary healthcare provider may suggest you use a warm sitz bath to help decrease pain. A sitz bath is a bathtub or basin filled to hip level. Stay in the sitz bath for 20 to 30 minutes, or as directed.   Vaginal discharge:  You will have vaginal discharge, called lochia, after your delivery. The lochia is bright red the first day or two after the birth. By the fourth day, the amount decreases, and it turns red-brown. Use a sanitary pad rather than a tampon to prevent a vaginal infection. It is normal to have lochia up to 8 weeks after your baby is born.   Monthly periods:  Your period may start again within 7 to 12 weeks after your baby is born. If you are breastfeeding, it may take longer for your period to start again. You can still get pregnant again even though you do not have your monthly period. Talk with your primary healthcare provider about a birth control method that will be good for you if you do not want to get pregnant.  Mood changes:  Many new mothers have some kind of mood changes after delivery. Some of these changes occur because of lack of sleep, hormone changes, and caring for a new baby. Some mood changes can be more serious, such as postpartum depression. Talk with your primary healthcare provider if you  feel unable to care for yourself or your baby.  Sexual activity:  You may need to avoid sex for 6 to 7 weeks after you have your baby. You may notice you have a decreased desire for sex, or sex may be painful. You may need to use a vaginal lubricant (gel) to help make sex more comfortable.  Contact your primary healthcare provider if:   You have heavy vaginal bleeding that fills 1 or more sanitary pads in 1 hour.    You have a fever.    Your pain does not go away, or gets worse.    The skin between your vagina and rectum is swollen, warm, or red.    You have swollen, hard, or painful breasts.    You feel very sad or depressed.    You feel more tired than usual.     You have questions or concerns about your condition or care.  Seek care immediately or call 911 if:   You have pus or yellow drainage coming from your vagina or wound.    You are urinating very little, or not at all.    Your arm or leg feels warm, tender, and painful. It may look swollen and red.    You feel lightheaded, have sudden and worsening chest pain, or trouble breathing. You may have more pain when you take deep breaths or cough, or you may cough up blood.  © 2014 [employer]. Information is for End User's use only and may not be sold, redistributed or otherwise used for commercial purposes. All illustrations and images included in CareNotes® are the copyrighted property of [employer]. or [employer].  The above information is an  only. It is not intended as medical advice for individual conditions or treatments. Talk to your doctor, nurse or pharmacist before following any medical regimen to see if it is safe and effective for you.    Postpartum Perineal Care   WHAT YOU NEED TO KNOW:   Postpartum perineal care is care for your perineum after you have a baby. The perineum is your vagina and anus.   DISCHARGE INSTRUCTIONS:   Care for your perineum:  Healthcare providers will give you a small squirt  bottle and show you how to use it. Do the following after you use the toilet and before you put on a new pad:  Remove the soiled pad    Use the squirt bottle to rinse your perineum from front to back while you sit on the toilet     Pat the area dry from front to back with toilet paper or a cotton cloth     Put on a fresh pad    Wash your hands  Decrease pain:  Ask your healthcare provider about these and other ways to decrease perineal pain:  Sitz baths:  Healthcare providers may give you a portable sitz bath. This is a small tub that fits in the toilet. Fill the sitz bath or bathtub with 4 to 6 inches of warm water. Sit in the warm water for 20 minutes 2 to 3 times a day.    Ice:  Ice helps decrease swelling and pain. Ice may also help prevent tissue damage. Use an ice pack, or put crushed ice in a plastic bag. Cover it with a towel and place it on your perineum for 15 to 20 minutes every hour, or as directed.    Medicine spray, wipes, or pads:  Healthcare providers may give you a medicine spray or wipes soaked with numbing medicine to decrease the pain. Pads that contain an herb called witch hazel may also help reduce pain. Use these after perineal care or a sitz bath.  Follow up with your healthcare provider as directed:  Write down your questions so you remember to ask them during your visits.   Contact your healthcare provider if:   You have heavy vaginal bleeding that fills 1 or more sanitary pads in 1 hour.    You have foul-smelling vaginal discharge.    You feel weak or lightheaded.    You have questions or concerns about your condition or care.  Seek care immediately or call 911 if:   You have large blood clots or bright red blood coming from your vagina.    You have abdominal pain, vomiting, and a fever.  © 2017 [employer] Information is for End User's use only and may not be sold, redistributed or otherwise used for commercial purposes. All illustrations and images included in CareNotes®  are the copyrighted property of [employer]. or [employer].  The above information is an  only. It is not intended as medical advice for individual conditions or treatments. Talk to your doctor, nurse or pharmacist before following any medical regimen to see if it is safe and effective for you.      Postpartum Depression   WHAT YOU NEED TO KNOW:   What is postpartum depression?  Postpartum depression is a mood disorder that occurs after giving birth. A mood is an emotion or a feeling. Moods affect your behavior and how you feel about yourself and life in general. Depression is a sad mood that you cannot control. Women often feel sad, afraid, or nervous after their baby is born. These feelings are called postpartum blues or baby blues, and they usually go away in 1 to 2 weeks. With postpartum depression, these symptoms get worse and continue for more than 2 weeks. Postpartum depression is a serious condition that affects your daily activities and relationships.   What causes postpartum depression?  Healthcare providers do not know exactly what causes postpartum depression. It may be caused by a sudden drop in hormone levels after childbirth. A previous episode of postpartum depression or a family history of depression may increase your risk. Several things may trigger postpartum depression:  Lack of support from the baby's father or other family members    Feeling more tired than usual    Stress, a poor diet, or lack of sleep    Pain after childbirth or pain during breastfeeding    Sudden change in lifestyle  How is postpartum depression diagnosed?  Postpartum depression affects your daily activities and your relationships with other people. Healthcare providers will ask you questions about your signs and symptoms and how they are affecting your life. The symptoms of postpartum depression usually begin within 1 month after childbirth. You feel depressed or lose interest in activities you  enjoy nearly every day for at least 2 weeks. You also have 4 or more of the following symptoms:  You feel tired or have less energy than usual.     You feel unimportant or guilty most of the time.    You think about hurting or killing yourself.    Your appetite changes. You may lose your appetite and lose weight without trying. Your appetite may also increase and you may gain weight.    You are restless, irritable, or withdrawn.    You have trouble concentrating and remembering things. You have trouble doing daily tasks or making decisions.    You have trouble sleeping, even after the baby is asleep.  How is postpartum depression treated?   Psychotherapy:  During therapy, you will talk with healthcare providers about how to cope with your feelings and moods. This can be done alone or in a group. It may also be done with family members or your partner.     Antidepressants:  This medicine is given to decrease or stop the symptoms of depression. You usually need to take antidepressants for several weeks before you begin to feel better. Do not stop taking antidepressants unless your healthcare provider tells you to. Healthcare providers may try a different antidepressant if one type does not work.  What can I do to feel better?   Rest:  Do not try to do everything all at the same time. Do only what is needed and let other things wait until later. Ask your family or friends for help, especially if you have other children. Ask your partner to help with night feedings or other baby care. Try to sleep when the baby naps.     Get emotional support:  Share your feelings with your partner, a friend, or another mother.     Take care of yourself:  Shower and dress each day. Do not skip meals. Try to get out of the house a little each day. Get regular exercise. Eat a healthy diet. Avoid alcohol because it can make your depression worse. Do not isolate yourself. Go for a walk or meet with a friend. It is also important that you  have some time by yourself each day.  How do I find support and more information?   [employer] [facility] ([facility]), Public Information & Communication Branch  [address], [address], [address]  [place] [address]   Phone: 8- 122 - [phone]  Phone: 2- 709 - [phone]  Web Address: [email or web address]  When should I contact my healthcare provider?   You cannot make it to your next visit.    Your depression does not get better with treatment or it gets worse.     You have questions or concerns about your condition or care.  When should I seek immediate care or call 911?   You think about hurting or killing yourself, your baby, or someone else.    You feel like other people want to hurt you.     You hear voices telling you to hurt yourself or your baby.  CARE AGREEMENT:   You have the right to help plan your care. Learn about your health condition and how it may be treated. Discuss treatment options with your caregivers to decide what care you want to receive. You always have the right to refuse treatment. The above information is an  only. It is not intended as medical advice for individual conditions or treatments. Talk to your doctor, nurse or pharmacist before following any medical regimen to see if it is safe and effective for you.  © 2017 [employer] Information is for End User's use only and may not be sold, redistributed or otherwise used for commercial purposes. All illustrations and images included in CareNotes® are the copyrighted property of [employer]M., Inc. or [employer].      Postpartum Bleeding   WHAT YOU NEED TO KNOW:   Postpartum bleeding is vaginal bleeding after childbirth. This bleeding is normal, whether your baby was born vaginally or by . It contains blood and the tissue that lined the inside of your uterus when you were pregnant.   DISCHARGE INSTRUCTIONS:   What to expect with postpartum bleeding:  Postpartum  bleeding usually lasts at least 10 days, and may last longer than 6 weeks. Your bleeding may range from light (barely staining a pad) to heavy (soaking a pad in 1 hour). Usually, you have heavier bleeding right after childbirth, which slows over the next few weeks until it stops. The bleeding is red or dark brown with clots for the first 1 to 3 days. It then turns pink for several days, and then becomes a white or yellow discharge until it ends.  Follow up with your obstetrician as directed:  Do not have sex until your obstetrician says it is okay. Write down your questions so you remember to ask them during your visits.  Contact your healthcare provider or obstetrician if:   Your bleeding increases, or you have heavy bleeding that soaks a pad in 1 hour for 2 hours in a row.    You pass large blood clots.    You are breathing faster than normal, or your heart is beating faster than normal.    You are urinating less than usual, or not at all.    You feel dizzy.    You have questions or concerns about your condition or care.  Seek immediate care or call 911 if:   You are suddenly short of breath and feel lightheaded.    You have sudden chest pain.  © 2017 [employer] Information is for End User's use only and may not be sold, redistributed or otherwise used for commercial purposes. All illustrations and images included in CareNotes® are the copyrighted property of [employer] or [employer].  The above information is an  only. It is not intended as medical advice for individual conditions or treatments. Talk to your doctor, nurse or pharmacist before following any medical regimen to see if it is safe and effective for you.      Breast Care for the Breast Feeding Mother   WHAT YOU SHOULD KNOW:   Your breasts will go through normal changes while you are breastfeeding. Sometimes breast and nipple problems can develop while you are breastfeeding. Learn about changes that are  normal and those that may be a problem. Breast care can help you prevent and manage problems so you and your baby can enjoy the benefits of breastfeeding.  AFTER YOU LEAVE:   Breast changes while you are breastfeeding:   For the first few days after your baby is born, your body makes a small amount of breast milk (colostrum). Within about 2 to 5 days, your body will begin making mature milk. It may take up to 10 days or longer for mature milk to come in. When your mature milk comes in, your breasts will become full and firm. They may feel tender.     Breastfeeding your baby will decrease the full feeling in your breasts. You may feel a tingly sensation during feedings as milk is released from your breasts. This is called the milk let-down reflex. After 7 or more days, the fullness may feel like it has decreased. Your nipples should look the same as they did before you started breastfeeding. Breasts that feel full before and empty after breastfeeding are signs that breastfeeding is going well.  Breast problems that can occur while you are breastfeeding:   Nipple soreness  may occur when you begin to breastfeed your baby. You may also have nipple soreness if your baby is not latched on to your breast correctly. Correct positioning and latch-on may decrease or stop the pain in your nipples. Work with your caregivers to help your baby latch on correctly. It may also be helpful to place warm, wet compresses on your nipples to help decrease pain.     Plugged milk ducts  may cause painful breast lumps. Plugged ducts may be caused by not emptying your breasts completely during feedings. When your baby pauses during breastfeeding, massage and gently squeeze your breast. Gentle massage may unplug a blocked milk duct. Pump out any milk left in your breasts after your baby is done breastfeeding. Avoid wearing tight tops, tight bras, or under-wire bras, because they may put pressure on your breasts.    Engorgement  may occur as  your milk comes in soon after you begin breastfeeding. Engorgement may cause your breasts to become swollen and painful. Your breasts may also become engorged if you miss a feeding or you do not breastfeed on demand. The best way to decrease engorgement symptoms is to empty your breasts by feeding your baby often. Engorgement can make it hard for your baby to latch on to your breast. If this happens, express a small amount of milk and then have your baby latch on. Cold compresses, gel packs, or ice packs on your breasts can help decrease pain and swelling. Ask your caregiver how often and how long you should use cold, or ice packs.     A breast infection called mastitis  can develop if you have plugged milk ducts or engorgement. Mastitis causes your breasts to become red, swollen, and painful. You may also have flu-like symptoms, such as chills and a fever. Place heat on your breasts to help decrease the pain. You may want to place a moist, warm cloth on the painful breast or both of your breasts. Ask how often to do this. Your primary healthcare provider (PHP) may suggest that you take an NSAID, such as ibuprofen, to decrease pain and swelling. He may also order antibiotics to treat mastitis. Ask about feeding your baby when you have a breast infection.  How to help prevent or manage breast problems while you are breastfeeding:   Learn how to position your baby and latch him on correctly.  To latch your baby correctly to your breast, make sure that his mouth covers most of your areola (dark area around your nipple). He should not be attached only to the nipple. Your baby is latched on well if you feel comfortable and do not feel pain. A correct latch helps him get enough milk and can help to prevent sore nipples and other breast problems. There are several breastfeeding positions that you can try. Find the position that works best for you and your baby. Ask your caregiver for more information about how to hold and  breastfeed your baby.     Prevent biting.  Your baby may get teeth at about 3 to 4 months of age. To help prevent biting, break his suction once he is finished or if he has fallen asleep. To break his suction, slip a finger into the side of his mouth. If your baby bites you, respond with surprise or unhappiness. Offer praise when he does not bite you.     Breastfeed your baby regularly.  Feed your baby 8 to 12 times a day. You may need to wake up your baby at night to feed him. It is okay to feed from 1 or both breasts at each feeding. Your baby should breastfeed from both breasts equally over the course of a day. If your baby only feeds from 1 side during a feeding, offer your other breast to him first for the next feeding.     Schedule and keep follow-up visits.  Talk to your baby's pediatrician or your PHP during follow-up visits if you have breast problems. Caregivers may suggest that you, or you and your partner, attend classes on breastfeeding. You also may want to join a breastfeeding support group. Caregivers may suggest that you see a lactation consultant. This is a caregiver who can help you with breastfeeding.  Contact your PHP if:   You have a fever and chills.    You have body aches and you feel like you do not have any energy.    One or both of your breasts is red, swollen or hard, painful, and feels warm or hot.    You have breast engorgement that does not get better within 24 hours.     You see or feel a lump in your breast that hurts when you touch it.    You have nipple pain during breastfeeding or between feedings.     Your nipples are red, dry, cracked, or bleeding, or they have scabs on them.     You have questions or concerns about your condition or care.  © 2014 [employer] Information is for End User's use only and may not be sold, redistributed or otherwise used for commercial purposes. All illustrations and images included in CareNotes® are the copyrighted property of  [employer]. or [employer].  The above information is an  only. It is not intended as medical advice for individual conditions or treatments. Talk to your doctor, nurse or pharmacist before following any medical regimen to see if it is safe and effective for you.

## 2025-06-13 NOTE — PLAN OF CARE
Problem: PAIN - ADULT  Goal: Verbalizes/displays adequate comfort level or baseline comfort level  Description: Interventions:  - Encourage patient to monitor pain and request assistance  - Assess pain using appropriate pain scale  - Administer analgesics as ordered based on type and severity of pain and evaluate response  - Implement non-pharmacological measures as appropriate and evaluate response  - Consider cultural and social influences on pain and pain management  - Notify physician/advanced practitioner if interventions unsuccessful or patient reports new pain  - Educate patient/family on pain management process including their role and importance of  reporting pain   - Provide non-pharmacologic/complimentary pain relief interventions  6/13/2025 1041 by Neisha Perez, RN  Outcome: Adequate for Discharge  6/13/2025 1041 by Neisha Perez, RN  Outcome: Adequate for Discharge

## 2025-06-15 ENCOUNTER — APPOINTMENT (EMERGENCY)
Dept: CT IMAGING | Facility: HOSPITAL | Age: 25
End: 2025-06-15
Payer: COMMERCIAL

## 2025-06-15 ENCOUNTER — APPOINTMENT (EMERGENCY)
Dept: RADIOLOGY | Facility: HOSPITAL | Age: 25
End: 2025-06-15
Payer: COMMERCIAL

## 2025-06-15 ENCOUNTER — HOSPITAL ENCOUNTER (EMERGENCY)
Facility: HOSPITAL | Age: 25
Discharge: HOME | End: 2025-06-16
Attending: EMERGENCY MEDICINE | Admitting: EMERGENCY MEDICINE
Payer: COMMERCIAL

## 2025-06-15 DIAGNOSIS — R60.9 EDEMA: ICD-10-CM

## 2025-06-15 DIAGNOSIS — H53.9 VISUAL DISTURBANCE: Primary | ICD-10-CM

## 2025-06-15 DIAGNOSIS — R06.00 DYSPNEA: ICD-10-CM

## 2025-06-15 LAB
2HR DELTA HS TROPONIN: <-1 NG/L
ALBUMIN SERPL BCG-MCNC: 3.1 G/DL (ref 3.5–5)
ALP SERPL-CCNC: 162 U/L (ref 34–104)
ALT SERPL W P-5'-P-CCNC: 34 U/L (ref 7–52)
ANION GAP SERPL CALCULATED.3IONS-SCNC: 8 MMOL/L (ref 4–13)
AST SERPL W P-5'-P-CCNC: 35 U/L (ref 13–39)
BACTERIA UR QL AUTO: ABNORMAL /HPF
BASOPHILS # BLD AUTO: 0.03 THOUSANDS/ÂΜL (ref 0–0.1)
BASOPHILS NFR BLD AUTO: 0 % (ref 0–1)
BILIRUB SERPL-MCNC: 0.19 MG/DL (ref 0.2–1)
BILIRUB UR QL STRIP: NEGATIVE
BUN SERPL-MCNC: 5 MG/DL (ref 5–25)
CALCIUM ALBUM COR SERPL-MCNC: 9.2 MG/DL (ref 8.3–10.1)
CALCIUM SERPL-MCNC: 8.5 MG/DL (ref 8.4–10.2)
CARDIAC TROPONIN I PNL SERPL HS: 3 NG/L (ref ?–50)
CARDIAC TROPONIN I PNL SERPL HS: <2 NG/L (ref ?–50)
CHLORIDE SERPL-SCNC: 108 MMOL/L (ref 96–108)
CLARITY UR: CLEAR
CO2 SERPL-SCNC: 24 MMOL/L (ref 21–32)
COLOR UR: COLORLESS
CREAT SERPL-MCNC: 0.55 MG/DL (ref 0.6–1.3)
CREAT UR-MCNC: 38.2 MG/DL
EOSINOPHIL # BLD AUTO: 0.15 THOUSAND/ÂΜL (ref 0–0.61)
EOSINOPHIL NFR BLD AUTO: 2 % (ref 0–6)
ERYTHROCYTE [DISTWIDTH] IN BLOOD BY AUTOMATED COUNT: 17.1 % (ref 11.6–15.1)
GFR SERPL CREATININE-BSD FRML MDRD: 130 ML/MIN/1.73SQ M
GLUCOSE SERPL-MCNC: 86 MG/DL (ref 65–140)
GLUCOSE UR STRIP-MCNC: NEGATIVE MG/DL
HCT VFR BLD AUTO: 31.8 % (ref 34.8–46.1)
HGB BLD-MCNC: 9.7 G/DL (ref 11.5–15.4)
HGB UR QL STRIP.AUTO: ABNORMAL
IMM GRANULOCYTES # BLD AUTO: 0.13 THOUSAND/UL (ref 0–0.2)
IMM GRANULOCYTES NFR BLD AUTO: 1 % (ref 0–2)
KETONES UR STRIP-MCNC: NEGATIVE MG/DL
LEUKOCYTE ESTERASE UR QL STRIP: ABNORMAL
LYMPHOCYTES # BLD AUTO: 2.14 THOUSANDS/ÂΜL (ref 0.6–4.47)
LYMPHOCYTES NFR BLD AUTO: 23 % (ref 14–44)
MAGNESIUM SERPL-MCNC: 1.6 MG/DL (ref 1.9–2.7)
MCH RBC QN AUTO: 25.1 PG (ref 26.8–34.3)
MCHC RBC AUTO-ENTMCNC: 30.5 G/DL (ref 31.4–37.4)
MCV RBC AUTO: 82 FL (ref 82–98)
MONOCYTES # BLD AUTO: 0.65 THOUSAND/ÂΜL (ref 0.17–1.22)
MONOCYTES NFR BLD AUTO: 7 % (ref 4–12)
NEUTROPHILS # BLD AUTO: 6.2 THOUSANDS/ÂΜL (ref 1.85–7.62)
NEUTS SEG NFR BLD AUTO: 67 % (ref 43–75)
NITRITE UR QL STRIP: NEGATIVE
NON-SQ EPI CELLS URNS QL MICRO: ABNORMAL /HPF
NRBC BLD AUTO-RTO: 0 /100 WBCS
PH UR STRIP.AUTO: 7 [PH]
PLATELET # BLD AUTO: 283 THOUSANDS/UL (ref 149–390)
PMV BLD AUTO: 11.4 FL (ref 8.9–12.7)
POTASSIUM SERPL-SCNC: 3.5 MMOL/L (ref 3.5–5.3)
PROT SERPL-MCNC: 6.3 G/DL (ref 6.4–8.4)
PROT UR STRIP-MCNC: NEGATIVE MG/DL
PROT UR-MCNC: 7.5 MG/DL
PROT/CREAT UR: 0.2 MG/G{CREAT}
RBC # BLD AUTO: 3.86 MILLION/UL (ref 3.81–5.12)
RBC #/AREA URNS AUTO: ABNORMAL /HPF
SODIUM SERPL-SCNC: 140 MMOL/L (ref 135–147)
SP GR UR STRIP.AUTO: >=1.05 (ref 1–1.03)
TSH SERPL DL<=0.05 MIU/L-ACNC: 1.27 UIU/ML (ref 0.45–4.5)
UROBILINOGEN UR STRIP-ACNC: <2 MG/DL
WBC # BLD AUTO: 9.3 THOUSAND/UL (ref 4.31–10.16)
WBC #/AREA URNS AUTO: ABNORMAL /HPF

## 2025-06-15 PROCEDURE — 71046 X-RAY EXAM CHEST 2 VIEWS: CPT

## 2025-06-15 PROCEDURE — 82570 ASSAY OF URINE CREATININE: CPT | Performed by: EMERGENCY MEDICINE

## 2025-06-15 PROCEDURE — 84484 ASSAY OF TROPONIN QUANT: CPT | Performed by: EMERGENCY MEDICINE

## 2025-06-15 PROCEDURE — 71275 CT ANGIOGRAPHY CHEST: CPT

## 2025-06-15 PROCEDURE — 84156 ASSAY OF PROTEIN URINE: CPT | Performed by: EMERGENCY MEDICINE

## 2025-06-15 PROCEDURE — 99285 EMERGENCY DEPT VISIT HI MDM: CPT

## 2025-06-15 PROCEDURE — 85025 COMPLETE CBC W/AUTO DIFF WBC: CPT | Performed by: EMERGENCY MEDICINE

## 2025-06-15 PROCEDURE — 96375 TX/PRO/DX INJ NEW DRUG ADDON: CPT

## 2025-06-15 PROCEDURE — 83735 ASSAY OF MAGNESIUM: CPT | Performed by: EMERGENCY MEDICINE

## 2025-06-15 PROCEDURE — 70496 CT ANGIOGRAPHY HEAD: CPT

## 2025-06-15 PROCEDURE — 36415 COLL VENOUS BLD VENIPUNCTURE: CPT | Performed by: EMERGENCY MEDICINE

## 2025-06-15 PROCEDURE — 93005 ELECTROCARDIOGRAM TRACING: CPT

## 2025-06-15 PROCEDURE — 84443 ASSAY THYROID STIM HORMONE: CPT | Performed by: EMERGENCY MEDICINE

## 2025-06-15 PROCEDURE — 80053 COMPREHEN METABOLIC PANEL: CPT | Performed by: EMERGENCY MEDICINE

## 2025-06-15 PROCEDURE — 81001 URINALYSIS AUTO W/SCOPE: CPT | Performed by: EMERGENCY MEDICINE

## 2025-06-15 PROCEDURE — 96366 THER/PROPH/DIAG IV INF ADDON: CPT

## 2025-06-15 PROCEDURE — 96365 THER/PROPH/DIAG IV INF INIT: CPT

## 2025-06-15 RX ORDER — SODIUM CHLORIDE 9 MG/ML
3 INJECTION INTRAVENOUS
Status: DISCONTINUED | OUTPATIENT
Start: 2025-06-15 | End: 2025-06-16 | Stop reason: HOSPADM

## 2025-06-15 RX ORDER — DIPHENHYDRAMINE HYDROCHLORIDE 50 MG/ML
25 INJECTION, SOLUTION INTRAMUSCULAR; INTRAVENOUS ONCE
Status: COMPLETED | OUTPATIENT
Start: 2025-06-15 | End: 2025-06-15

## 2025-06-15 RX ORDER — KETOROLAC TROMETHAMINE 30 MG/ML
15 INJECTION, SOLUTION INTRAMUSCULAR; INTRAVENOUS ONCE
Status: COMPLETED | OUTPATIENT
Start: 2025-06-15 | End: 2025-06-15

## 2025-06-15 RX ORDER — METOCLOPRAMIDE HYDROCHLORIDE 5 MG/ML
10 INJECTION INTRAMUSCULAR; INTRAVENOUS ONCE
Status: COMPLETED | OUTPATIENT
Start: 2025-06-15 | End: 2025-06-15

## 2025-06-15 RX ORDER — MAGNESIUM SULFATE HEPTAHYDRATE 40 MG/ML
2 INJECTION, SOLUTION INTRAVENOUS ONCE
Status: COMPLETED | OUTPATIENT
Start: 2025-06-15 | End: 2025-06-16

## 2025-06-15 RX ADMIN — MAGNESIUM SULFATE HEPTAHYDRATE 2 G: 40 INJECTION, SOLUTION INTRAVENOUS at 22:59

## 2025-06-15 RX ADMIN — METOCLOPRAMIDE 10 MG: 5 INJECTION, SOLUTION INTRAMUSCULAR; INTRAVENOUS at 22:53

## 2025-06-15 RX ADMIN — DIPHENHYDRAMINE HYDROCHLORIDE 25 MG: 50 INJECTION, SOLUTION INTRAMUSCULAR; INTRAVENOUS at 22:52

## 2025-06-15 RX ADMIN — SODIUM CHLORIDE 1000 ML: 0.9 INJECTION, SOLUTION INTRAVENOUS at 22:51

## 2025-06-15 RX ADMIN — IOHEXOL 85 ML: 350 INJECTION, SOLUTION INTRAVENOUS at 19:56

## 2025-06-15 RX ADMIN — KETOROLAC TROMETHAMINE 15 MG: 30 INJECTION, SOLUTION INTRAMUSCULAR at 22:51

## 2025-06-16 VITALS
HEIGHT: 65 IN | HEART RATE: 58 BPM | BODY MASS INDEX: 29.9 KG/M2 | WEIGHT: 179.45 LBS | OXYGEN SATURATION: 100 % | DIASTOLIC BLOOD PRESSURE: 66 MMHG | RESPIRATION RATE: 21 BRPM | SYSTOLIC BLOOD PRESSURE: 105 MMHG | TEMPERATURE: 98 F

## 2025-06-16 DIAGNOSIS — Z59.41 FOOD INSECURITY: Primary | ICD-10-CM

## 2025-06-16 LAB
ATRIAL RATE: 56 BPM
ATRIAL RATE: 58 BPM
ATRIAL RATE: 63 BPM
P AXIS: 44 DEGREES
P AXIS: 51 DEGREES
P AXIS: 68 DEGREES
PR INTERVAL: 178 MS
PR INTERVAL: 180 MS
PR INTERVAL: 202 MS
QRS AXIS: 83 DEGREES
QRS AXIS: 85 DEGREES
QRS AXIS: 87 DEGREES
QRSD INTERVAL: 74 MS
QRSD INTERVAL: 76 MS
QRSD INTERVAL: 76 MS
QT INTERVAL: 394 MS
QT INTERVAL: 402 MS
QT INTERVAL: 426 MS
QTC INTERVAL: 387 MS
QTC INTERVAL: 403 MS
QTC INTERVAL: 418 MS
T WAVE AXIS: 60 DEGREES
T WAVE AXIS: 65 DEGREES
T WAVE AXIS: 69 DEGREES
VENTRICULAR RATE: 56 BPM
VENTRICULAR RATE: 58 BPM
VENTRICULAR RATE: 63 BPM

## 2025-06-16 PROCEDURE — 99285 EMERGENCY DEPT VISIT HI MDM: CPT | Performed by: EMERGENCY MEDICINE

## 2025-06-16 PROCEDURE — 93010 ELECTROCARDIOGRAM REPORT: CPT | Performed by: INTERNAL MEDICINE

## 2025-06-16 SDOH — ECONOMIC STABILITY - FOOD INSECURITY: FOOD INSECURITY: Z59.41

## 2025-06-16 NOTE — UTILIZATION REVIEW
"NOTIFICATION OF INPATIENT ADMISSION   MATERNITY/DELIVERY AUTHORIZATION REQUEST   SERVICING FACILITY:   [facility]  Parent Child Health - L&D, [place], NICU   [facility] [place] [address]  Tax ID: [id number]  NPI: [id number]   ATTENDING PROVIDER:  Attending Name and NPI#: Maria C Barlow Do [[id number]]  Address: [address] [place] [address] [place] [address]  Phone: [phone]   ADMISSION INFORMATION:  Place of Service: Inpatient [facility]  Place of Service Code: 21  Inpatient Admission Date/Time: 25  8:17 PM  Discharge Date/Time: 2025  1:54 PM  Admitting Diagnosis Code/Description:  Amniotic fluid leaking [O42.90]  37 weeks gestation of pregnancy [Z3A.37]  Encounter for full-term uncomplicated delivery [O80]     Mother: Shyam Rizvi 2000 Estimated Date of Delivery: 25  Delivering clinician: Maria C Barlow   OB History          3    Para   2    Term   1       1    AB   1    Living   2         SAB   1    IAB   0    Ectopic   0    Multiple   0    Live Births   2                Name & MRN:   Information for the patient's :  Santos Lombardo [[id number]]    Delivery Information:  Sex: male  Delivered 2025 10:34 PM by Vaginal, Spontaneous; Gestational Age: 37w0d    [place] Measurements:  Weight: 6 lb 7.5 oz (2935 g);  Height: 19\"    APGAR 1 minute 5 minutes 10 minutes   Totals: 8 9       UTILIZATION REVIEW CONTACT:  Amirah Khan, Utilization   Network Utilization Review Department  Phone: [phone]  Fax [phone]  Email: [email or web address]  Contact for approvals/pending authorizations, clinical reviews, and discharge.     PHYSICIAN ADVISORY SERVICES:  Medical Necessity Denial & Zete-zf-[id number] Review  Phone: [phone]  Fax: [phone]  Email: [email or web address]     DISCHARGE SUPPORT TEAM:  For Patients Discharge Needs & Updates  Phone: [phone] opt. 2 Fax: " [phone]  Email: [email or web address]

## 2025-06-16 NOTE — ED PROVIDER NOTES
Time reflects when diagnosis was documented in both MDM as applicable and the Disposition within this note       Time User Action Codes Description Comment    6/15/2025 11:42 PM Sheila Gaxiola Add [H53.9] Visual disturbance     6/15/2025 11:42 PM Sheila Gaxiola Add [R06.00] Dyspnea     6/15/2025 11:42 PM Sheila Gaxiola Add [R60.9] Edema           ED Disposition       ED Disposition   Discharge    Condition   Stable    Date/Time   Sun Dada 15, 2025 11:42 PM    Comment   Shyam Rizvi discharge to home/self care.                   Assessment & Plan       Medical Decision Making  23 y/o female 4 days PP with sob, vision changes, and edema. BP normal. Will get blood work, ct scans, and reassess.     ED Course as of 06/16/25 0023  Sun Dada 15, 2025  1916 Spoke with Dr Quan from radiology about recommendations on scan to r/o both PE and CVST - states that he will get back to me shortly with recommendations   1932 Spoke with Dr Quan- recommends cta pe and ctv brain - states that we can do these 2 studies   2206 Spoke with Dr Barlow from OBGYN does not believe this is an OB issue. Recommends talking to neuro. No need for transfer at this time   2251 Spoke with Dr Coughlin from neuro- recommends trying migraine cocktail and can d/c for outpatient follow up       Amount and/or Complexity of Data Reviewed  Labs: ordered.  Radiology: ordered.    Risk  Prescription drug management.        ED Course as of 06/16/25 0029   Sun Dada 15, 2025   1916 Spoke with Dr Quan from radiology about recommendations on scan to r/o both PE and CVST - states that he will get back to me shortly with recommendations    1932 Spoke with Dr Quan- recommends cta pe and ctv brain - states that we can do these 2 studies    2206 Spoke with Dr Barlow from OBGYN does not believe this is an OB issue. Recommends talking to neuro. No need for transfer at this time    2251 Spoke with Dr Coughlin from neuro- recommends trying migraine  "cocktail and can d/c for outpatient follow up        Medications   sodium chloride (PF) 0.9 % injection 3 mL (has no administration in time range)   sodium chloride 0.9 % bolus 1,000 mL (1,000 mL Intravenous New Bag 6/15/25 2251)   iohexol (OMNIPAQUE) 350 MG/ML injection (MULTI-DOSE) 85 mL (85 mL Intravenous Given 6/15/25 1956)   magnesium sulfate 2 g/50 mL IVPB (premix) 2 g (2 g Intravenous New Bag 6/15/25 2259)   ketorolac (TORADOL) injection 15 mg (15 mg Intravenous Given 6/15/25 2251)   metoclopramide (REGLAN) injection 10 mg (10 mg Intravenous Given 6/15/25 2253)   diphenhydrAMINE (BENADRYL) injection 25 mg (25 mg Intravenous Given 6/15/25 2252)       ED Risk Strat Scores                    No data recorded                            History of Present Illness       Chief Complaint   Patient presents with    Shortness of Breath     States she gave birth 4 days ago and is now c/o blurry vision, dizziness.        Past Medical History[1]   Past Surgical History[2]   Family History[3]   Social History[4]   E-Cigarette/Vaping    E-Cigarette Use Never User       E-Cigarette/Vaping Substances    Nicotine No     THC No     CBD No     Flavoring No     Other No     Unknown No       I have reviewed and agree with the history as documented.     25 y/o female 4 days post partum after an uncomplicated  at 37 weeks, presents to the ED for sob, vision changes, and swelling. She states that she developed sob and ankle swelling yesterday. States that around 10p last night she developed \"flashes of light\" bilaterally. Reports that she feels lightheaded. Denies any other dizziness or focal complaints. Reports that she has a hx of migraine but this is different then her prior migraines and she deneis any headache. She states that during her prengnacy she had similar visual symptoms that were intermttent. She denies any cough, fever, n/v, abd pain, urinary symptoms. States that she has mild vaginal bleeding with small clots. No " hx of pre-eclampsia. Denies any complications with her pregnancy. No other complaints.       History provided by:  Patient      Review of Systems   Constitutional:  Negative for chills and fever.   HENT:  Negative for congestion, ear pain and sore throat.    Eyes:  Positive for visual disturbance. Negative for pain.   Respiratory:  Positive for shortness of breath. Negative for cough and wheezing.    Cardiovascular:  Negative for chest pain and leg swelling.   Gastrointestinal:  Negative for abdominal pain, diarrhea, nausea and vomiting.   Genitourinary:  Negative for dysuria, frequency, hematuria and urgency.   Musculoskeletal:  Negative for neck pain and neck stiffness.   Skin:  Negative for rash and wound.   Neurological:  Negative for weakness, numbness and headaches.   Psychiatric/Behavioral:  Negative for agitation and confusion.    All other systems reviewed and are negative.          Objective       ED Triage Vitals   Temperature Pulse Blood Pressure Respirations SpO2 Patient Position - Orthostatic VS   06/15/25 1841 06/15/25 1841 06/15/25 1841 06/15/25 1841 06/15/25 1841 06/15/25 1841   98 °F (36.7 °C) 72 121/76 18 98 % Sitting      Temp Source Heart Rate Source BP Location FiO2 (%) Pain Score    06/15/25 1841 06/15/25 1841 06/15/25 1841 -- 06/15/25 2251    Temporal Monitor Left arm  3      Vitals      Date and Time Temp Pulse SpO2 Resp BP Pain Score FACES Pain Rating User   06/15/25 2300 -- 58 -- 21 126/87 -- -- EN   06/15/25 2251 -- -- -- -- -- 3 -- EN   06/15/25 2208 -- 57 100 % 16 116/81 -- -- EN   06/15/25 1900 -- 62 98 % 16 111/79 -- -- AC   06/15/25 1841 98 °F (36.7 °C) 72 98 % 18 121/76 -- -- LA            Physical Exam  Vitals and nursing note reviewed.   Constitutional:       Appearance: She is well-developed.   HENT:      Head: Normocephalic and atraumatic.     Eyes:      Pupils: Pupils are equal, round, and reactive to light.       Cardiovascular:      Rate and Rhythm: Normal rate and regular  rhythm.   Pulmonary:      Effort: Pulmonary effort is normal.      Breath sounds: Normal breath sounds.   Abdominal:      General: Bowel sounds are normal.      Palpations: Abdomen is soft.     Musculoskeletal:         General: Normal range of motion.      Cervical back: Normal range of motion and neck supple.      Comments: Trace edema at the ankles bilaterally      Skin:     General: Skin is warm and dry.     Neurological:      General: No focal deficit present.      Mental Status: She is alert and oriented to person, place, and time.      Comments: No focal deficits       Results Reviewed       Procedure Component Value Units Date/Time    Urine Microscopic [[id number]]  (Abnormal) Collected: 06/15/25 2210    Lab Status: Final result Specimen: Urine, Clean Catch Updated: 06/15/25 2319     RBC, UA 10-20 /hpf      WBC, UA 4-10 /hpf      Epithelial Cells Occasional /hpf      Bacteria, UA None Seen /hpf     UA w Reflex to Microscopic w Reflex to Culture [[id number]]  (Abnormal) Collected: 06/15/25 2210    Lab Status: Final result Specimen: Urine, Clean Catch Updated: 06/15/25 2318     Color, UA Colorless     Clarity, UA Clear     Specific Gravity, UA >=1.050     pH, UA 7.0     Leukocytes, UA Small     Nitrite, UA Negative     Protein, UA Negative mg/dl      Glucose, UA Negative mg/dl      Ketones, UA Negative mg/dl      Urobilinogen, UA <2.0 mg/dl      Bilirubin, UA Negative     Occult Blood, UA Moderate    Protein / creatinine ratio, urine [[id number]] Collected: 06/15/25 2210    Lab Status: Final result Specimen: Urine, Clean Catch Updated: 06/15/25 2233     Creatinine, Ur 38.2 mg/dL      Protein Urine Random 7.5 mg/dL      Prot/Creat Ratio, Ur 0.2    HS Troponin I 2hr [[id number]]  (Normal) Collected: 06/15/25 2154    Lab Status: Final result Specimen: Blood from Arm, Right Updated: 06/15/25 2224     hs TnI 2hr <2 ng/L      Delta 2hr hsTnI <-1 ng/L     TSH, 3rd generation with Free T4 reflex [[id number]]  (Normal)  Collected: 06/15/25 1910    Lab Status: Final result Specimen: Blood from Arm, Right Updated: 06/15/25 2140     TSH 3RD GENERATION 1.266 uIU/mL     Magnesium [[id number]]  (Abnormal) Collected: 06/15/25 1910    Lab Status: Final result Specimen: Blood from Arm, Right Updated: 06/15/25 2125     Magnesium 1.6 mg/dL     HS Troponin I 4hr [[id number]]     Lab Status: No result Specimen: Blood     HS Troponin 0hr (reflex protocol) [[id number]]  (Normal) Collected: 06/15/25 1910    Lab Status: Final result Specimen: Blood from Arm, Right Updated: 06/15/25 1950     hs TnI 0hr 3 ng/L     Comprehensive metabolic panel [[id number]]  (Abnormal) Collected: 06/15/25 1910    Lab Status: Final result Specimen: Blood from Arm, Right Updated: 06/15/25 1944     Sodium 140 mmol/L      Potassium 3.5 mmol/L      Chloride 108 mmol/L      CO2 24 mmol/L      ANION GAP 8 mmol/L      BUN 5 mg/dL      Creatinine 0.55 mg/dL      Glucose 86 mg/dL      Calcium 8.5 mg/dL      Corrected Calcium 9.2 mg/dL      AST 35 U/L      ALT 34 U/L      Alkaline Phosphatase 162 U/L      Total Protein 6.3 g/dL      Albumin 3.1 g/dL      Total Bilirubin 0.19 mg/dL      eGFR 130 ml/min/1.73sq m     Narrative:      National Kidney Disease Foundation guidelines for Chronic Kidney Disease (CKD):     Stage 1 with normal or high GFR (GFR > 90 mL/min/1.73 square meters)    Stage 2 Mild CKD (GFR = 60-89 mL/min/1.73 square meters)    Stage 3A Moderate CKD (GFR = 45-59 mL/min/1.73 square meters)    Stage 3B Moderate CKD (GFR = 30-44 mL/min/1.73 square meters)    Stage 4 Severe CKD (GFR = 15-29 mL/min/1.73 square meters)    Stage 5 End Stage CKD (GFR <15 mL/min/1.73 square meters)  Note: GFR calculation is accurate only with a steady state creatinine    CBC and differential [[id number]]  (Abnormal) Collected: 06/15/25 1910    Lab Status: Final result Specimen: Blood from Arm, Right Updated: 06/15/25 1928     WBC 9.30 Thousand/uL      RBC 3.86 Million/uL      Hemoglobin 9.7 g/dL       Hematocrit 31.8 %      MCV 82 fL      MCH 25.1 pg      MCHC 30.5 g/dL      RDW 17.1 %      MPV 11.4 fL      Platelets 283 Thousands/uL      nRBC 0 /100 WBCs      Segmented % 67 %      Immature Grans % 1 %      Lymphocytes % 23 %      Monocytes % 7 %      Eosinophils Relative 2 %      Basophils Relative 0 %      Absolute Neutrophils 6.20 Thousands/µL      Absolute Immature Grans 0.13 Thousand/uL      Absolute Lymphocytes 2.14 Thousands/µL      Absolute Monocytes 0.65 Thousand/µL      Eosinophils Absolute 0.15 Thousand/µL      Basophils Absolute 0.03 Thousands/µL             CTA chest pe study   Final Interpretation by Arleen Anderson MD (06/15 2055)      No evidence of pulmonary embolus.      4 mm left lower lobe pulmonary nodule. Based on current Fleischner Society 2017 Guidelines on incidental pulmonary nodule, no routine follow-up is needed if the patient is low risk. If the patient is high risk, optional follow-up chest CT at 12 months    can be considered.                  Computerized Assisted Algorithm (CAA) may have aided analysis of applicable images.      Workstation performed: [id number]         CT VENOGRAM BRAIN   Final Interpretation by Prabhu Hernandez MD (06/15 2043)      1.  No acute intracranial abnormality. No CT etiology for headaches identified.   2.  No evidence of dural venous sinus thrombosis         Workstation performed: [id number]         X-ray chest 2 views    (Results Pending)       Procedures    ED Medication and Procedure Management   Prior to Admission Medications   Prescriptions Last Dose Informant Patient Reported? Taking?   Prenatal Vit-Fe Fumarate-FA (PRENATAL PO)  Self Yes No   Sig: Take by mouth   acetaminophen (TYLENOL) 650 mg CR tablet  Self No No   Sig: Take 1 tablet (650 mg total) by mouth every 8 (eight) hours as needed for mild pain   benzocaine-menthol-lanolin-aloe (DERMOPLAST) 20-0.5 % topical spray   No No   Sig: Apply 1 Application topically every 6 (six) hours as needed  for mild pain or irritation   calcium carbonate (TUMS) 500 mg chewable tablet   No No   Sig: Chew 2 tablets (1,000 mg total) daily as needed for indigestion or heartburn   docusate sodium (COLACE) 100 mg capsule   No No   Sig: Take 1 capsule (100 mg total) by mouth 2 (two) times a day   hydrocortisone 1 % cream   No No   Sig: Apply 1 Application topically daily as needed for irritation or rash   ibuprofen (MOTRIN) 600 mg tablet   No No   Sig: Take 1 tablet (600 mg total) by mouth every 6 (six) hours   sertraline (Zoloft) 50 mg tablet   No No   Sig: Take 1.5 tablets (75 mg total) by mouth daily   valACYclovir (VALTREX) 500 mg tablet   No No   Sig: Take 1 tablet (500 mg total) by mouth 2 (two) times a day   witch hazel-glycerin (TUCKS) topical pad   No No   Sig: Apply 1 Pad topically every 4 (four) hours as needed for irritation      Facility-Administered Medications: None     Patient's Medications   Discharge Prescriptions    No medications on file     No discharge procedures on file.  ED SEPSIS DOCUMENTATION   Time reflects when diagnosis was documented in both MDM as applicable and the Disposition within this note       Time User Action Codes Description Comment    6/15/2025 11:42 PM Sheila Gaxiola Add [H53.9] Visual disturbance     6/15/2025 11:42 PM Sheila Gaxiola Add [R06.00] Dyspnea     6/15/2025 11:42 PM Sheila Gaxiola Add [R60.9] Edema                    [1]   Past Medical History:  Diagnosis Date    Anemia     Chlamydia 2019    COVID-19 11/26/2022 2024    Depression     Gonorrhea     2019   tx'd    Migraine     Sickle cell trait (HCC)     Varicella     childhood chickenpox   [2]   Past Surgical History:  Procedure Laterality Date    NO PAST SURGERIES     [3]   Family History  Problem Relation Name Age of Onset    Mental illness Mother Trang     Depression Mother Trang     Gout Mother Trang     Sickle cell trait Mother Trang     Sickle cell trait Father Tony     Pulmonary embolism Father  Chavo     Hypertension Half-Brother Morgan     Panic disorder Half-Brother Morgan     Depression Half-Brother Christopher     Stroke Maternal Grandmother Sintia     Diabetes Maternal Grandmother Sintia     Alcohol abuse Maternal Grandfather      Cancer Paternal Grandmother Marquita     Cancer Paternal Grandfather Santos     Breast cancer Cousin Maternal Cousin     Schizophrenia Half-Brother Chavo Mazariegos,     Sickle cell trait Half-Brother Chavo Mazariegos,     Colon cancer Neg Hx      Ovarian cancer Neg Hx      Uterine cancer Neg Hx      Cervical cancer Neg Hx     [4]   Social History  Tobacco Use    Smoking status: Never    Smokeless tobacco: Never   Vaping Use    Vaping status: Never Used   Substance Use Topics    Alcohol use: Not Currently     Comment: nothing after 1st month of pregnancy    Drug use: Not Currently     Types: Marijuana     Comment: marijuana-last used 2023-FOB-marijuana; denies parent use- fob's uncle-heroin        Sheila Gaxiola, DO  06/16/25 0029

## 2025-06-17 ENCOUNTER — TELEPHONE (OUTPATIENT)
Dept: OBGYN CLINIC | Facility: CLINIC | Age: 25
End: 2025-06-17

## 2025-06-17 ENCOUNTER — PATIENT OUTREACH (OUTPATIENT)
Dept: OBGYN CLINIC | Facility: CLINIC | Age: 25
End: 2025-06-17

## 2025-06-17 ENCOUNTER — TELEPHONE (OUTPATIENT)
Age: 25
End: 2025-06-17

## 2025-06-17 ENCOUNTER — HOSPITAL ENCOUNTER (OUTPATIENT)
Dept: INFUSION CENTER | Facility: CLINIC | Age: 25
Discharge: HOME | End: 2025-06-17
Attending: OBSTETRICS & GYNECOLOGY

## 2025-06-17 VITALS
TEMPERATURE: 98.8 F | OXYGEN SATURATION: 96 % | SYSTOLIC BLOOD PRESSURE: 110 MMHG | RESPIRATION RATE: 18 BRPM | HEART RATE: 57 BPM | DIASTOLIC BLOOD PRESSURE: 72 MMHG

## 2025-06-17 DIAGNOSIS — O99.019 IRON DEFICIENCY ANEMIA DURING PREGNANCY: ICD-10-CM

## 2025-06-17 DIAGNOSIS — D50.9 IRON DEFICIENCY ANEMIA DURING PREGNANCY: ICD-10-CM

## 2025-06-17 NOTE — TELEPHONE ENCOUNTER
Post partum  , . was told to go to ED today when arrived for infusion. Patient is still experiencing dizziness and vision changes.   Was to the ED 6/15  Patient states she wants on call OB  to know she does not think she needs to go back to ED but will go as she was advised.   ESC Dr. Barlow- it seems like this is her baseline, she can go to the ED if she would like another migraine cocktail. if she has worsening symptoms she definiteily should go to ED  She should also try to fllow up with her PCP. Her BP has always been normal so it is unlikely related to her delivery.   1:21 phone call back to patient and reviewed Dr. Barlow's note and recommendations. Patient verbalzied understanding and was very thankful for Dr's follow up and call back. No questions.

## 2025-06-17 NOTE — PROGRESS NOTES
Pt into clinic for Venofer. Pt offers complaints of dizziness, vision changes (flashes of light), and nausea without vomiting. Pt went to ED on 6/15/25 for same symptoms. Pt stated these symptoms have not resolved since she was discharged from the ED.   Spoke with Cheyenne Lindquist DO. Per provider, pt will not receive venofer treatment today. Pt advised to go to ED for further examination of symptoms. Pt also informed to call OBGyn office to discuss symptoms. Pt expresses understanding, no further questions at this time.   Pt walked out of clinic without incident.

## 2025-06-17 NOTE — PROGRESS NOTES
SW referred for SDOH (food) noted at delivery. Patient had a baby boy at [facility] on 6/11/25. Called patient to introduce role & complete assessment - patient available to speak with SW.     Reports she is already connected to SNAP, just needs to submit her documents for annual renewal. Agreeable to SW sending information for food pantries in her area, as well as WIC info.     Denies other SDOH concerns currently, but expressed interest in moving to another county to be closer to family. Was told that public housing wait lists are closed in this area, and that she cannot apply for another county if she does not reside there. Sent information via Find Help for [facility]'s public housing program to get more information. They currently have a wait list. Also sent info for local housing/rental assistance.    Patient reports she and baby are doing well overall. She denies further concerns or need for additional resources today. Feels comfortable reaching out to SW if she requires further assistance later on. SW will close current referral, please re-refer as needed.

## 2025-06-17 NOTE — ASSESSMENT & PLAN NOTE
Hgb / Hct       Results from last 7 days   Lab Units 06/15/25  1910 06/11/25 2035 06/11/25  1738   HEMOGLOBIN g/dL 9.7* 10.4* 10.1*   HEMATOCRIT % 31.8* 32.7* 32.2*     On admission Hgb 10.4  QBL 25 ml

## 2025-06-17 NOTE — ASSESSMENT & PLAN NOTE
12/12 Patient declined [facility], amniocentesis, expanded carrier screening, and [place] single gene NIPT; patient's partner declined hemoglobinopathy screening.

## 2025-06-24 ENCOUNTER — NURSE TRIAGE (OUTPATIENT)
Age: 25
End: 2025-06-24

## 2025-06-24 ENCOUNTER — TELEPHONE (OUTPATIENT)
Dept: INFUSION CENTER | Facility: CLINIC | Age: 25
End: 2025-06-24

## 2025-06-24 DIAGNOSIS — O91.23 MASTITIS ASSOCIATED WITH LACTATION: Primary | ICD-10-CM

## 2025-06-24 DIAGNOSIS — N61.0 MASTITIS: ICD-10-CM

## 2025-06-24 RX ORDER — DICLOXACILLIN SODIUM 500 MG/1
500 CAPSULE ORAL 4 TIMES DAILY
Qty: 40 CAPSULE | Refills: 0 | Status: SHIPPED | OUTPATIENT
Start: 2025-06-24 | End: 2025-07-04

## 2025-06-24 RX ORDER — CLINDAMYCIN HYDROCHLORIDE 150 MG/1
450 CAPSULE ORAL 3 TIMES DAILY
Qty: 90 CAPSULE | Refills: 0 | Status: SHIPPED | OUTPATIENT
Start: 2025-06-24 | End: 2025-07-04

## 2025-06-24 NOTE — TELEPHONE ENCOUNTER
Pharmacy called regarding medication. Pharmacy stated they do not carry dicloxacillin and contacted pt. Pt would like a different medication sent to pharmacy. Pharmacy aware of message sent.

## 2025-06-24 NOTE — TELEPHONE ENCOUNTER
Detailed VM left for patient (per communication consent) to advise that an alternative antibiotic (Clindamycin) was sent to her preferred pharmacy. Advised patient to call back for any other questions or concerns.

## 2025-06-24 NOTE — TELEPHONE ENCOUNTER
Patient has 3 no shows within a 6 month period for infusion.  She has delivered and may not need future venofer infusion.

## 2025-06-24 NOTE — TELEPHONE ENCOUNTER
"REASON FOR CONVERSATION: Breast Problem    SYMPTOMS: breastfeeding and has a left breast hard lump/blocked duct, redness to breast, pain and fever    OTHER HEALTH INFORMATION:   reporting mastitis symptoms that started yesterday.  She first noticed a painful hard lump.  Then overnight and this morning she had 103F fever.    PROTOCOL DISPOSITION: Order Prescription    CARE ADVICE PROVIDED: Take tylenol and Ibuprofen for fever and pain, increase fluid intake.  Ice for pain relief, warm most compress to hard lump before nursing/pumping.  Referral to Baby & Me placed.  Antibiotics ordered per protocol and instructed how to take.  Advised calling back if fever persisted with meds and fluids, or new/worsening symptoms.    PRACTICE FOLLOW-UP: message to Dr. Barlow        Does the patient have an allergy to Penicillin? No    If YES, prescribe: Clindamycin 450mg PO Q8H for ten (10) days with no refills.  If NO, prescribe: Dicloxacillin 500mg QID x 10 days with no refills.      Patient instructions:  Call and schedule an appoinment if symptoms are not better after 24-48h of abx use.    Start an OTC probiotic for 14 days if they are not already taking one.    Please offer Baby & Me services to patient - if patient agrees, please place AMB REFERRAL TO [facility] for lactation services    Reason for Disposition   SEVERE breast pain (e.g., excruciating) and fever > 103 F (39.4 C)    Answer Assessment - Initial Assessment Questions  1. SYMPTOM: \"What's the main symptom you're concerned about?\" (e.g., pain, redness, swelling)      Fever, blocked breast lump   2. ONSET: \"When did the  symptoms  start?\"      Yesterday morning  3. LOCATION: \"Which breast?\" (e.g., left, right, both) \"Is the pain in the breast or just the nipple?\"      Left   4. PAIN: \"How bad is the pain?\"  (Scale 1-10; or mild, moderate, severe)      7/10  5. REDNESS: \"Does the skin appear red? Does the breast feel hot to touch?\"       yes  6. LUMP OR " "SWELLING: \"Does the breast feel hard or have lumps?\"      yes  7. DELIVERY DATE: \"When was your delivery date?\" \"Vaginal delivery or ?\"        8. BREASTFEEDING AND PUMPING: \"Did you breastfeed your baby or use a breast pump after delivery?\" If Yes, ask: \"When did you last breastfeed or pump your breasts?\"      Yes pumping and nursing  9. FEVER: \"Do you have a fever?\" If Yes, ask: \"What is it, how was it measured, and when did it start?\"      103 last night  10. OTHER SYMPTOMS: \"Do you have any other symptoms?\" (e.g., feeling sad or depressed, nipple symptoms)        denies    Protocols used: Postpartum - Breast Pain and Engorgement-Adult-[place]    "

## 2025-06-25 ENCOUNTER — TELEPHONE (OUTPATIENT)
Dept: OBGYN CLINIC | Facility: CLINIC | Age: 25
End: 2025-06-25

## 2025-06-25 NOTE — TELEPHONE ENCOUNTER
----- Message from Zhane YOUNG sent at 6/17/2025  7:56 AM EDT -----  Regarding: Postpartum  delivered 6/11 37w

## 2025-07-01 ENCOUNTER — NURSE TRIAGE (OUTPATIENT)
Age: 25
End: 2025-07-01

## 2025-07-01 NOTE — TELEPHONE ENCOUNTER
"REASON FOR CONVERSATION: Fever    SYMPTOMS: fever    OTHER HEALTH INFORMATION:  on --was prescribed antibiotics for mastitis 1 week ago. Only completed approx 3 days of therapy. Breast symptoms have resolved aside from occasional breast pain. Tmax 99.7 F orally.     PROTOCOL DISPOSITION: Home Care    CARE ADVICE PROVIDED: call with temp >100.4 F accompanied by s/s of infection. Call with worsening symptoms    PRACTICE FOLLOW-UP: n.a      Reason for Disposition   Fever < 100.4 F (38.0 C)    Answer Assessment - Initial Assessment Questions  1. LEVEL: \"What is the most recent temperature?\"       99.7  2. MEASUREMENT: \"How was it measured?\"       orally  3. ONSET: \"When did the fever start?\"       today  4. CHILLS: \"Do you have chills?\" If yes: \"How bad are they?\"  (e.g., none, mild, moderate, severe)      Yes--moderate/severe  5. OTHER SYMPTOMS: \"Do you have any other symptoms besides the fever?\"  (e.g., abdomen pain, breast pain, cough, diarrhea, urination pain, vaginal discharge)      denies  6. DELIVERY DATE: \"When was your delivery date?\" \"Vaginal delivery or ?\"          9. CAUSE: If there are no symptoms, ask: \"What do you think is causing the fever?\"       unsure  10. CONTACTS: \"Does anyone else in the family have an infection?\"        denies  11. TREATMENT: \"What have you done so far to treat this fever?\" (e.g., medications)        naproxen  12. BREASTFEEDING: \"Are you breastfeeding?\"        yes  13. TRAVEL: \"Have you traveled out of the country in the last month?\" (e.g., travel history, exposures)        denies    Protocols used: Postpartum - Fever-Adult-[place]    "

## 2025-07-03 ENCOUNTER — TELEPHONE (OUTPATIENT)
Dept: FAMILY MEDICINE CLINIC | Facility: CLINIC | Age: 25
End: 2025-07-03

## 2025-07-03 NOTE — TELEPHONE ENCOUNTER
Left message for patient to call [facility] prior to appointment to change PC to Dr Haydee Burris, DO the overseeing PCP for our location.

## 2025-07-09 ENCOUNTER — OFFICE VISIT (OUTPATIENT)
Dept: FAMILY MEDICINE CLINIC | Facility: CLINIC | Age: 25
End: 2025-07-09
Payer: COMMERCIAL

## 2025-07-09 VITALS
TEMPERATURE: 97.4 F | SYSTOLIC BLOOD PRESSURE: 120 MMHG | RESPIRATION RATE: 16 BRPM | WEIGHT: 167 LBS | HEART RATE: 118 BPM | BODY MASS INDEX: 27.82 KG/M2 | OXYGEN SATURATION: 97 % | HEIGHT: 65 IN | DIASTOLIC BLOOD PRESSURE: 70 MMHG

## 2025-07-09 DIAGNOSIS — F32.2 SEVERE MAJOR DEPRESSIVE DISORDER (HCC): ICD-10-CM

## 2025-07-09 DIAGNOSIS — H53.8 BLURRY VISION: ICD-10-CM

## 2025-07-09 DIAGNOSIS — R91.1 PULMONARY NODULE: ICD-10-CM

## 2025-07-09 DIAGNOSIS — D50.0 IRON DEFICIENCY ANEMIA DUE TO CHRONIC BLOOD LOSS: Primary | ICD-10-CM

## 2025-07-09 PROCEDURE — 99214 OFFICE O/P EST MOD 30 MIN: CPT | Performed by: NURSE PRACTITIONER

## 2025-07-09 NOTE — PROGRESS NOTES
Name: Shyam Rizvi      : 2000      MRN: [id number]  Encounter Provider: TREY Aguillon  Encounter Date: 2025   Encounter department: [facility]  :  Assessment & Plan  Iron deficiency anemia due to chronic blood loss    Patient has a history of iron deficiency anemia.  Continues to have shortness of breath, increased fatigue.  CBC ordered iron panel ordered.  Will restart infusions if needed.  Encouraged to increase intake of iron rich foods    Orders:    CBC and differential    Iron Panel (Includes Ferritin, Iron Sat%, Iron, and TIBC)    Blurry vision  Patient reports problems with blurry vision.  Blood work ordered.  Discussed maintaining good fluid hydration, patient is breast-feeding.  Blood pressure has been stable.  Patient also reports that she has stopped Zoloft abruptly.  Did discuss this may be one of the withdrawal effects.  Orders:    Hemoglobin A1C    Comprehensive metabolic panel    Pulmonary nodule  Patient has a 4 mm pulmonary left lower lobe nodule.  Discussed results with patient.  Patient reports some shortness of breath discussed with patient this may be because more likely by iron deficiency anemia.  Lung sounds are clear.  No further interventions at this time         Severe major depressive disorder (HCC)  Patient depression Screening Follow-up Plan: Patient's depression screening was positive with an [place]  Depression Scale score of 7. Patient assessed for underlying major depression. They have no active suicidal ideations. Brief counseling provided and recommend additional follow-up/re-evaluation next office visit.  Reports that she has stopped taking Zoloft.  Did not wean of the medication.  Discussed withdrawal effects.  Assess for postpartum depression patient is stable.  Denies any depressive symptoms.  Just reports feeling tired.  Advised to call office if medications need to be restarted.               Depression Screening and  "Follow-up Plan: Patient's depression screening was positive with a PHQ-9 score of 9.   Patient assessed for underlying major depression. Brief counseling provided and recommend additional follow-up/re-evaluation next office visit.       History of Present Illness   Patient is here to follow-up on shortness of breath.  Had a CAT scan done has a small pulmonary nodule.  Patient gave birth approximately a month ago.  Was receiving iron infusions prior to delivery      Review of Systems   Constitutional:  Positive for fatigue.   HENT: Negative.     Eyes: Negative.    Respiratory:  Positive for shortness of breath.    Cardiovascular: Negative.    Gastrointestinal: Negative.    Endocrine: Negative.    Genitourinary: Negative.    Musculoskeletal: Negative.    Skin: Negative.    Allergic/Immunologic: Negative.    Neurological: Negative.    Psychiatric/Behavioral: Negative.         Objective   /70 (BP Location: Left arm, Patient Position: Sitting, Cuff Size: Large)   Pulse (!) 118   Temp (!) 97.4 °F (36.3 °C) (Temporal)   Resp 16   Ht 5' 5\" (1.651 m)   Wt 75.8 kg (167 lb)   LMP  (LMP Unknown)   SpO2 97%   Breastfeeding Yes   BMI 27.79 kg/m²      Physical Exam  Constitutional:       General: She is not in acute distress.     Appearance: Normal appearance. She is not ill-appearing.   HENT:      Head: Normocephalic and atraumatic.     Cardiovascular:      Rate and Rhythm: Regular rhythm. Tachycardia present.      Pulses: Normal pulses.   Pulmonary:      Effort: Pulmonary effort is normal. No respiratory distress.      Breath sounds: Normal breath sounds.   Chest:      Chest wall: No tenderness.   Abdominal:      General: Abdomen is flat. Bowel sounds are normal. There is no distension.      Palpations: There is no mass.      Tenderness: There is no abdominal tenderness.     Musculoskeletal:         General: Normal range of motion.      Cervical back: Normal range of motion and neck supple.     Skin:     General: " Skin is warm and dry.     Neurological:      General: No focal deficit present.      Mental Status: She is alert and oriented to person, place, and time.     Psychiatric:         Attention and Perception: Attention normal.         Mood and Affect: Mood normal.         Behavior: Behavior normal. Behavior is cooperative.         Thought Content: Thought content normal.         Cognition and Memory: Cognition normal.         Judgment: Judgment normal.

## 2025-07-09 NOTE — ASSESSMENT & PLAN NOTE
Patient has a 4 mm pulmonary left lower lobe nodule.  Discussed results with patient.  Patient reports some shortness of breath discussed with patient this may be because more likely by iron deficiency anemia.  Lung sounds are clear.  No further interventions at this time

## 2025-07-09 NOTE — ASSESSMENT & PLAN NOTE
Patient depression Screening Follow-up Plan: Patient's depression screening was positive with an [place]  Depression Scale score of 7. Patient assessed for underlying major depression. They have no active suicidal ideations. Brief counseling provided and recommend additional follow-up/re-evaluation next office visit.  Reports that she has stopped taking Zoloft.  Did not wean of the medication.  Discussed withdrawal effects.  Assess for postpartum depression patient is stable.  Denies any depressive symptoms.  Just reports feeling tired.  Advised to call office if medications need to be restarted.

## 2025-07-22 ENCOUNTER — POSTPARTUM VISIT (OUTPATIENT)
Dept: OBGYN CLINIC | Facility: MEDICAL CENTER | Age: 25
End: 2025-07-22

## 2025-07-22 ENCOUNTER — APPOINTMENT (OUTPATIENT)
Dept: LAB | Facility: MEDICAL CENTER | Age: 25
End: 2025-07-22
Attending: NURSE PRACTITIONER
Payer: COMMERCIAL

## 2025-07-22 VITALS
BODY MASS INDEX: 27.16 KG/M2 | WEIGHT: 163 LBS | HEIGHT: 65 IN | DIASTOLIC BLOOD PRESSURE: 70 MMHG | SYSTOLIC BLOOD PRESSURE: 118 MMHG

## 2025-07-22 LAB
ALBUMIN SERPL BCG-MCNC: 4.1 G/DL (ref 3.5–5)
ALP SERPL-CCNC: 125 U/L (ref 34–104)
ALT SERPL W P-5'-P-CCNC: 25 U/L (ref 7–52)
ANION GAP SERPL CALCULATED.3IONS-SCNC: 7 MMOL/L (ref 4–13)
AST SERPL W P-5'-P-CCNC: 26 U/L (ref 13–39)
BASOPHILS # BLD AUTO: 0.06 THOUSANDS/ÂΜL (ref 0–0.1)
BASOPHILS NFR BLD AUTO: 1 % (ref 0–1)
BILIRUB SERPL-MCNC: 0.37 MG/DL (ref 0.2–1)
BUN SERPL-MCNC: 10 MG/DL (ref 5–25)
CALCIUM SERPL-MCNC: 9.6 MG/DL (ref 8.4–10.2)
CHLORIDE SERPL-SCNC: 103 MMOL/L (ref 96–108)
CO2 SERPL-SCNC: 28 MMOL/L (ref 21–32)
CREAT SERPL-MCNC: 0.63 MG/DL (ref 0.6–1.3)
EOSINOPHIL # BLD AUTO: 0.6 THOUSAND/ÂΜL (ref 0–0.61)
EOSINOPHIL NFR BLD AUTO: 7 % (ref 0–6)
ERYTHROCYTE [DISTWIDTH] IN BLOOD BY AUTOMATED COUNT: 18.3 % (ref 11.6–15.1)
EST. AVERAGE GLUCOSE BLD GHB EST-MCNC: 111 MG/DL
FERRITIN SERPL-MCNC: 80 NG/ML (ref 30–307)
GFR SERPL CREATININE-BSD FRML MDRD: 125 ML/MIN/1.73SQ M
GLUCOSE P FAST SERPL-MCNC: 76 MG/DL (ref 65–99)
HBA1C MFR BLD: 5.5 %
HCT VFR BLD AUTO: 38.9 % (ref 34.8–46.1)
HGB BLD-MCNC: 11.6 G/DL (ref 11.5–15.4)
IMM GRANULOCYTES # BLD AUTO: 0.03 THOUSAND/UL (ref 0–0.2)
IMM GRANULOCYTES NFR BLD AUTO: 0 % (ref 0–2)
IRON SATN MFR SERPL: 27 % (ref 15–50)
IRON SERPL-MCNC: 109 UG/DL (ref 50–212)
LYMPHOCYTES # BLD AUTO: 3.53 THOUSANDS/ÂΜL (ref 0.6–4.47)
LYMPHOCYTES NFR BLD AUTO: 43 % (ref 14–44)
MCH RBC QN AUTO: 24.3 PG (ref 26.8–34.3)
MCHC RBC AUTO-ENTMCNC: 29.8 G/DL (ref 31.4–37.4)
MCV RBC AUTO: 81 FL (ref 82–98)
MONOCYTES # BLD AUTO: 0.82 THOUSAND/ÂΜL (ref 0.17–1.22)
MONOCYTES NFR BLD AUTO: 10 % (ref 4–12)
NEUTROPHILS # BLD AUTO: 3.16 THOUSANDS/ÂΜL (ref 1.85–7.62)
NEUTS SEG NFR BLD AUTO: 39 % (ref 43–75)
NRBC BLD AUTO-RTO: 0 /100 WBCS
PLATELET # BLD AUTO: 290 THOUSANDS/UL (ref 149–390)
PMV BLD AUTO: 10.1 FL (ref 8.9–12.7)
POTASSIUM SERPL-SCNC: 4.4 MMOL/L (ref 3.5–5.3)
PROT SERPL-MCNC: 7.6 G/DL (ref 6.4–8.4)
RBC # BLD AUTO: 4.78 MILLION/UL (ref 3.81–5.12)
SODIUM SERPL-SCNC: 138 MMOL/L (ref 135–147)
TIBC SERPL-MCNC: 408.8 UG/DL (ref 250–450)
TRANSFERRIN SERPL-MCNC: 292 MG/DL (ref 203–362)
UIBC SERPL-MCNC: 300 UG/DL (ref 155–355)
WBC # BLD AUTO: 8.2 THOUSAND/UL (ref 4.31–10.16)

## 2025-07-22 PROCEDURE — 99024 POSTOP FOLLOW-UP VISIT: CPT | Performed by: STUDENT IN AN ORGANIZED HEALTH CARE EDUCATION/TRAINING PROGRAM

## 2025-07-22 NOTE — PROGRESS NOTES
(spontaneous vaginal delivery)  Postpartum clinic visit     Subjective:    Shyam Rizvi presents for her post partum visit.   She delivered on 25 by  at 37+0 weeks gestation.   I have fully reviewed the prenatal and intrapartum course.   The baby boy weighed 6 lbs 7 oz and is doing well.   Baby is feeding by breast.   The patient currently has no bleeding.   Patient has resumed sexually active, unprotected   Contraceptive plan is IUD  Postpartum depression screening: positive. EPDS 12, referral in system  Patient reports no additional complaints.    Patient's medications, allergies, past medical, surgical, social and family histories were reviewed and updated as appropriate.    Review of Systems  All other systems are negative  Pertinent items are noted in HPI.    Objective:    No LMP recorded (lmp unknown).    General: alert, cooperative, no distress, and appears stated age  Vulva: Normal, well healed  Vagina: normal vagina. No discharge, exudate, lesion, erythema  Cervix: no lesions  Corpus: normal size, contour, position, consistency, mobility, non-tender  Adnexa: non-tender, no masses  Rectal Exam: not performed.     Assessment:    Postpartum exam.     Plan:    Contraception: Mirena IUD, unprotected sex in the last week. Reviewed option for emergency contraception but unsure if first encounter was within 5 days so declines. Will wait 2 wks to place, knows no unprotected encounters in that time.    Elevated EPDS: reports overall doing okay, has referral for baby and me, recheck in 2 wks with IUD    Follow up: 2 weeks

## 2025-07-22 NOTE — ASSESSMENT & PLAN NOTE
Postpartum clinic visit     Subjective:    Shyam Rizvi presents for her post partum visit.   She delivered on 25 by  at 37+0 weeks gestation.   I have fully reviewed the prenatal and intrapartum course.   The baby boy weighed 6 lbs 7 oz and is doing well.   Baby is feeding by breast.   The patient currently has no bleeding.   Patient has resumed sexually active, unprotected   Contraceptive plan is IUD  Postpartum depression screening: positive. EPDS 12, referral in system  Patient reports no additional complaints.    Patient's medications, allergies, past medical, surgical, social and family histories were reviewed and updated as appropriate.    Review of Systems  All other systems are negative  Pertinent items are noted in HPI.    Objective:    No LMP recorded (lmp unknown).    General: alert, cooperative, no distress, and appears stated age  Vulva: Normal, well healed  Vagina: normal vagina. No discharge, exudate, lesion, erythema  Cervix: no lesions  Corpus: normal size, contour, position, consistency, mobility, non-tender  Adnexa: non-tender, no masses  Rectal Exam: not performed.     Assessment:    Postpartum exam.     Plan:    Contraception: Mirena IUD, unprotected sex in the last week. Reviewed option for emergency contraception but unsure if first encounter was within 5 days so declines. Will wait 2 wks to place, knows no unprotected encounters in that time.    Elevated EPDS: reports overall doing okay, has referral for baby and me, recheck in 2 wks with IUD    Follow up: 2 weeks

## 2025-08-21 ENCOUNTER — OFFICE VISIT (OUTPATIENT)
Dept: OBGYN CLINIC | Facility: CLINIC | Age: 25
End: 2025-08-21
Payer: COMMERCIAL

## 2025-08-21 ENCOUNTER — NURSE TRIAGE (OUTPATIENT)
Age: 25
End: 2025-08-21

## 2025-08-21 ENCOUNTER — TELEPHONE (OUTPATIENT)
Dept: OBGYN CLINIC | Facility: CLINIC | Age: 25
End: 2025-08-21

## 2025-08-21 VITALS — BODY MASS INDEX: 27.29 KG/M2 | WEIGHT: 164 LBS

## 2025-08-21 DIAGNOSIS — F32.A ANXIETY AND DEPRESSION: ICD-10-CM

## 2025-08-21 DIAGNOSIS — L29.2 VULVOVAGINAL ITCHING: ICD-10-CM

## 2025-08-21 DIAGNOSIS — L29.2 VULVOVAGINAL ITCHING: Primary | ICD-10-CM

## 2025-08-21 DIAGNOSIS — Z11.3 SCREENING FOR STD (SEXUALLY TRANSMITTED DISEASE): ICD-10-CM

## 2025-08-21 DIAGNOSIS — F41.9 ANXIETY AND DEPRESSION: ICD-10-CM

## 2025-08-21 PROCEDURE — 99213 OFFICE O/P EST LOW 20 MIN: CPT

## 2025-08-21 RX ORDER — NYSTATIN AND TRIAMCINOLONE ACETONIDE 100000; 1 [USP'U]/G; MG/G
CREAM TOPICAL 2 TIMES DAILY
Qty: 15 G | Refills: 0 | Status: SHIPPED | OUTPATIENT
Start: 2025-08-21 | End: 2025-08-22

## 2025-08-22 LAB
CANDIDA RRNA VAG QL PROBE: NOT DETECTED
G VAGINALIS RRNA GENITAL QL PROBE: NOT DETECTED
T VAGINALIS RRNA GENITAL QL PROBE: NOT DETECTED

## 2025-08-22 RX ORDER — NYSTATIN AND TRIAMCINOLONE ACETONIDE 100000; 1 [USP'U]/G; MG/G
CREAM TOPICAL 2 TIMES DAILY
Qty: 15 G | Refills: 0 | Status: SHIPPED | OUTPATIENT
Start: 2025-08-22 | End: 2025-08-29

## 2025-08-23 LAB
C TRACH DNA SPEC QL NAA+PROBE: NEGATIVE
N GONORRHOEA DNA SPEC QL NAA+PROBE: NEGATIVE